# Patient Record
Sex: FEMALE | Race: WHITE | NOT HISPANIC OR LATINO | Employment: OTHER | URBAN - METROPOLITAN AREA
[De-identification: names, ages, dates, MRNs, and addresses within clinical notes are randomized per-mention and may not be internally consistent; named-entity substitution may affect disease eponyms.]

---

## 2017-01-25 DIAGNOSIS — I48.20 CHRONIC ATRIAL FIBRILLATION (HCC): ICD-10-CM

## 2017-01-27 RX ORDER — DABIGATRAN ETEXILATE MESYLATE 75 MG/1
CAPSULE ORAL
Qty: 60 CAP | Refills: 3 | Status: ON HOLD | OUTPATIENT
Start: 2017-01-27 | End: 2017-05-08

## 2017-02-08 DIAGNOSIS — N39.3 STRESS INCONTINENCE: Chronic | ICD-10-CM

## 2017-02-08 RX ORDER — DIAPER,BRIEF,ADULT, DISPOSABLE
1 EACH MISCELLANEOUS 4 TIMES DAILY PRN
Qty: 96 EACH | Status: SHIPPED | OUTPATIENT
Start: 2017-02-08 | End: 2017-02-15 | Stop reason: SDUPTHER

## 2017-02-15 DIAGNOSIS — N39.3 STRESS INCONTINENCE: Chronic | ICD-10-CM

## 2017-02-15 RX ORDER — DIAPER,BRIEF,ADULT, DISPOSABLE
1 EACH MISCELLANEOUS 4 TIMES DAILY PRN
Qty: 96 EACH | Status: SHIPPED | OUTPATIENT
Start: 2017-02-15 | End: 2017-03-17

## 2017-02-16 NOTE — TELEPHONE ENCOUNTER
Was the patient seen in the last year in this department? Yes     Does patient have an active prescription for medications requested? Yes     Received Request Via: Pharmacy     Sent to wrong pharmacy. Please re-sign

## 2017-03-15 ENCOUNTER — OFFICE VISIT (OUTPATIENT)
Dept: MEDICAL GROUP | Facility: MEDICAL CENTER | Age: 82
End: 2017-03-15
Payer: MEDICARE

## 2017-03-15 VITALS
OXYGEN SATURATION: 94 % | DIASTOLIC BLOOD PRESSURE: 66 MMHG | TEMPERATURE: 98.6 F | HEART RATE: 108 BPM | BODY MASS INDEX: 29.08 KG/M2 | SYSTOLIC BLOOD PRESSURE: 112 MMHG | HEIGHT: 62 IN | WEIGHT: 158 LBS | RESPIRATION RATE: 16 BRPM

## 2017-03-15 DIAGNOSIS — F02.80 LATE ONSET ALZHEIMER'S DISEASE WITHOUT BEHAVIORAL DISTURBANCE (HCC): ICD-10-CM

## 2017-03-15 DIAGNOSIS — G30.1 LATE ONSET ALZHEIMER'S DISEASE WITHOUT BEHAVIORAL DISTURBANCE (HCC): ICD-10-CM

## 2017-03-15 DIAGNOSIS — R05.9 COUGH: ICD-10-CM

## 2017-03-15 DIAGNOSIS — J96.11 CHRONIC RESPIRATORY FAILURE WITH HYPOXIA (HCC): ICD-10-CM

## 2017-03-15 DIAGNOSIS — I48.20 ATRIAL FIBRILLATION, CHRONIC (HCC): ICD-10-CM

## 2017-03-15 PROCEDURE — 4040F PNEUMOC VAC/ADMIN/RCVD: CPT | Performed by: INTERNAL MEDICINE

## 2017-03-15 PROCEDURE — 1100F PTFALLS ASSESS-DOCD GE2>/YR: CPT | Performed by: INTERNAL MEDICINE

## 2017-03-15 PROCEDURE — G8420 CALC BMI NORM PARAMETERS: HCPCS | Performed by: INTERNAL MEDICINE

## 2017-03-15 PROCEDURE — 1036F TOBACCO NON-USER: CPT | Performed by: INTERNAL MEDICINE

## 2017-03-15 PROCEDURE — 0518F FALL PLAN OF CARE DOCD: CPT | Mod: 8P | Performed by: INTERNAL MEDICINE

## 2017-03-15 PROCEDURE — 99214 OFFICE O/P EST MOD 30 MIN: CPT | Performed by: INTERNAL MEDICINE

## 2017-03-15 PROCEDURE — G8482 FLU IMMUNIZE ORDER/ADMIN: HCPCS | Performed by: INTERNAL MEDICINE

## 2017-03-15 PROCEDURE — G8432 DEP SCR NOT DOC, RNG: HCPCS | Performed by: INTERNAL MEDICINE

## 2017-03-15 PROCEDURE — 3288F FALL RISK ASSESSMENT DOCD: CPT | Performed by: INTERNAL MEDICINE

## 2017-03-15 RX ORDER — DOXYCYCLINE HYCLATE 100 MG
100 TABLET ORAL 2 TIMES DAILY
Qty: 14 TAB | Refills: 0 | Status: SHIPPED | OUTPATIENT
Start: 2017-03-15 | End: 2017-03-17

## 2017-03-15 NOTE — PROGRESS NOTES
CC: Complaining of cough and follow up other issues.    HPI:   Arlen Singh presents today with the following.    1. Cough  Presents with multiple sick family members at home with upper respirator symptoms. Her symptoms been present from proximally 2 days. Noticed slight development of cough that is fairly unproductive. She reports a very mild sore throat. There is no earaches and denies any increased work of breathing.    2. Chronic respiratory failure with hypoxia (CMS-[facility])  Maintain on regular dose of oxygen and satting well.    3. Atrial fibrillation, chronic (CMS-HCC)  Denies any chest pains or palpitations she is coming due for follow-up with cardiology.    4. Late onset Alzheimer's disease without behavioral disturbance  Daughter reports slight increased fatigue and confusion. Last 2 days. There's been no change in urinary symptoms.      Patient Active Problem List    Diagnosis Date Noted   • Chronic respiratory failure with hypoxia (CMS-[facility]) 02/27/2014     Priority: Medium   • Late onset Alzheimer's disease without behavioral disturbance 03/15/2017   • Mixed incontinence 05/08/2016   • Atrial fibrillation, chronic (CMS-[facility]) 04/15/2016   • Gastroesophageal reflux disease without esophagitis 12/02/2015   • Risk for falls 03/12/2014   • Unstable gait 10/18/2013   • Chronic anticoagulation 10/02/2013   • Dyslipidemia 06/19/2009   • HTN (hypertension), benign 06/19/2009       Current Outpatient Prescriptions   Medication Sig Dispense Refill   • doxycycline (VIBRAMYCIN) 100 MG Tab Take 1 Tab by mouth 2 times a day for 7 days. 14 Tab 0   • Incontinence Supply Disposable (PREVAIL SUPER PLUS LARGE) Misc 1 Each by Other route 4 times a day as needed. USE AS DIRECTED 96 Each PRN   • PRADAXA 75 MG Cap capsule TAKE 1 CAPSULE BY MOUTH TWICE DAILY 60 Cap 3   • VESICARE 10 MG tablet TAKE 1 TABLET BY MOUTH EVERY DAY 30 Tab 11   • rivastigmine (EXELON) 9.5 MG/24HR patch APPLY 1 PATCH EXTERNALLY TO THE SKIN EVERY DAY AS  "DIRECTED (Patient not taking: Reported on 12/20/2016) 30 Patch 0   • levetiracetam (KEPPRA) 250 MG tablet TAKE 1/2 TABLET BY MOUTH TWICE DAILY (Patient not taking: Reported on 12/20/2016) 30 Tab 0   • Dermatological Products, Misc. (MOISTURE BARRIER) Ointment 1 Application by Other route 3 times a day. 6 Tube 10   • NAMENDA XR 28 MG CAPSULE SR 24 HR TAKE 1 CAPSULE BY MOUTH ONCE DAILY 30 Cap 6   • Calcium Carb-Cholecalciferol (CALCIUM 1000 + D PO) Take  by mouth.     • Cyanocobalamin (VITAMIN B 12 PO) Take 5,000 Units by mouth.     • omeprazole (PRILOSEC) 20 MG delayed-release capsule TAKE ONE CAPSULE BY MOUTH ONCE DAILY 30 Cap 11   • digoxin (DIGOX) 125 MCG Tab Take 1 Tab by mouth every day. 30 Tab 11   • Misc. Devices MISC Urinary incontinence underwear because of dementia, alzheimer's and urinary incontinence. 99 Each 99   • vitamin D (CHOLECALCIFEROL) 1000 UNIT TABS Take 2,000 Units by mouth every day.     • polyethylene glycol 3350 (MIRALAX) POWD TAKE 1 CAPFUL BY MOUTH EVERY DAY WITH WATER OR JUICE 527 g 0     No current facility-administered medications for this visit.         Allergies as of 03/15/2017 - George as Reviewed 03/15/2017   Allergen Reaction Noted   • Codeine Rash 06/19/2009   • Avelox [moxifloxacin hcl in nacl]  03/12/2014   • Hctz  03/08/2011        ROS: As per HPI.    /66 mmHg  Pulse 108  Temp(Src) 37 °C (98.6 °F)  Resp 16  Ht 1.575 m (5' 2.01\")  Wt 71.668 kg (158 lb)  BMI 28.89 kg/m2  SpO2 94%    Physical Exam:  Gen:         Alert and oriented, No apparent distress.  Neck:        No Lymphadenopathy or Bruits.  Lungs:     Clear to auscultation bilaterally  CV:          Regular rate and rhythm. No murmurs, rubs or gallops.               Ext:          No clubbing, cyanosis, edema.      Assessment and Plan.   88 y.o. female with the following issues.    1. Cough  Likely viral in etiology but given her comorbidities starting on doxycycline and given ER precautions if she worsening. " Recommended over-the-counter Mucinex.    2. Chronic respiratory failure with hypoxia (CMS-HCC)  Continue O2    3. Atrial fibrillation, chronic (CMS-HCC)  She will schedule appointment with cardiology    4. Late onset Alzheimer's disease without behavioral disturbance  Clinical stable follow-up neurology.

## 2017-03-15 NOTE — MR AVS SNAPSHOT
"Arlen Cruz   3/15/2017 9:00 AM   Office Visit   MRN: [id number]    Department:  [address] [place] [address]   Dept Phone:  [phone]    Description:  Female : 3/9/1929   Provider:  Stuart Santacruz M.D.           Reason for Visit     Follow-Up           Allergies as of 3/15/2017     Allergen Noted Reactions    Codeine 2009   Rash    Avelox [Moxifloxacin Hcl In Nacl] 2014       Hctz 2011       Hyponatremia        You were diagnosed with     Cough   [786.2.ICD-9-CM]       Chronic respiratory failure with hypoxia (CMS-HCC)   [686194]       Atrial fibrillation, chronic (CMS-HCC)   [346569]       Late onset Alzheimer's disease without behavioral disturbance   [2131962]         Vital Signs     Blood Pressure Pulse Temperature Respirations Height Weight    112/66 mmHg 108 37 °C (98.6 °F) 16 1.575 m (5' 2.01\") 71.668 kg (158 lb)    Body Mass Index Oxygen Saturation Smoking Status             28.89 kg/m2 94% Never Smoker          Basic Information     Date Of Birth Sex Race Ethnicity Preferred Language    3/9/1929 Female White Non- English      Your appointments     Mar 29, 2017 11:00 AM   Follow Up Visit with Judy Wood M.D.   [facility] Neurology (--)    [address] [place] [address] [phone]           You will be receiving a confirmation call a few days before your appointment from our automated call confirmation system.              Problem List              ICD-10-CM Priority Class Noted - Resolved    Dyslipidemia (Chronic) E78.5   2009 - Present    HTN (hypertension), benign (Chronic) I10   2009 - Present    Chronic anticoagulation Z79.01   10/2/2013 - Present    Unstable gait R26.81   10/18/2013 - Present    Chronic respiratory failure with hypoxia (CMS-HCC) J96.11 Medium  2014 - Present    Risk for falls Z91.81   3/12/2014 - Present    Gastroesophageal reflux disease without esophagitis K21.9   2015 - Present    Atrial " fibrillation, chronic (CMS-HCC) I48.2   4/15/2016 - Present    Mixed incontinence N39.46   5/8/2016 - Present    Late onset Alzheimer's disease without behavioral disturbance G30.1, F02.80   3/15/2017 - Present      Health Maintenance        Date Due Completion Dates    IMM ZOSTER VACCINE 4/17/2024 (Originally 3/9/1989) ---    IMM DTaP/Tdap/Td Vaccine (2 - Td) 7/12/2021 7/12/2011, 7/7/2011            Current Immunizations     13-VALENT PCV PREVNAR 1/12/2016    Dtap Vaccine 7/12/2011    Influenza TIV (IM) 9/27/2014, 10/1/2013, 10/4/2012, 10/3/2011    Influenza Vaccine Adult HD 9/20/2016    Pneumococcal Vaccine (UF)Historical Data 10/3/2011    Pneumococcal polysaccharide vaccine (PPSV-23) 10/2/2011    TD Vaccine 7/7/2011    Tuberculin Skin Test 1/12/2016, 2/13/2015  3:40 PM, 6/26/2013  1:25 PM, 6/19/2013 12:02 PM      Below and/or attached are the medications your provider expects you to take. Review all of your home medications and newly ordered medications with your provider and/or pharmacist. Follow medication instructions as directed by your provider and/or pharmacist. Please keep your medication list with you and share with your provider. Update the information when medications are discontinued, doses are changed, or new medications (including over-the-counter products) are added; and carry medication information at all times in the event of emergency situations     Allergies:  CODEINE - Rash     AVELOX - (reactions not documented)     HCTZ - (reactions not documented)               Medications  Valid as of: March 15, 2017 -  9:26 AM    Generic Name Brand Name Tablet Size Instructions for use    Calcium Carb-Cholecalciferol   Take  by mouth.        Cholecalciferol (Tab) cholecalciferol 1000 UNIT Take 2,000 Units by mouth every day.        Cyanocobalamin   Take 5,000 Units by mouth.        Dabigatran Etexilate Mesylate (Cap) PRADAXA 75 MG TAKE 1 CAPSULE BY MOUTH TWICE DAILY        Dermatological Products, Misc.  (Ointment) MOISTURE BARRIER  1 Application by Other route 3 times a day.        Digoxin (Tab) LANOXIN 125 MCG Take 1 Tab by mouth every day.        Doxycycline Hyclate (Tab) VIBRAMYCIN 100 MG Take 1 Tab by mouth 2 times a day for 7 days.        Incontinence Supply Disposable (Misc) PREVAIL SUPER PLUS LARGE  1 Each by Other route 4 times a day as needed. USE AS DIRECTED        LevETIRAcetam (Tab) KEPPRA 250 MG TAKE 1/2 TABLET BY MOUTH TWICE DAILY        Memantine HCl (CAPSULE SR 24 HR) NAMENDA XR 28 MG TAKE 1 CAPSULE BY MOUTH ONCE DAILY        Misc. Devices (Misc) Misc. Devices  Urinary incontinence underwear because of dementia, alzheimer's and urinary incontinence.        Omeprazole (CAPSULE DELAYED RELEASE) PRILOSEC 20 MG TAKE ONE CAPSULE BY MOUTH ONCE DAILY        Polyethylene Glycol 3350 (Powder) MIRALAX  TAKE 1 CAPFUL BY MOUTH EVERY DAY WITH WATER OR JUICE        Rivastigmine (PATCH 24 HR) EXELON 9.5 MG/24HR APPLY 1 PATCH EXTERNALLY TO THE SKIN EVERY DAY AS DIRECTED        Solifenacin Succinate (Tab) VESICARE 10 MG TAKE 1 TABLET BY MOUTH EVERY DAY        .                 Medicines prescribed today were sent to:     [id number] - [place], [place] - [address]    [address] [facility] [place] [address]    Phone: [phone] Fax: [phone]    Open 24 Hours?: No    [facility] [address] [place] [address] [place] - [facility] [place] AT [facility] [address] [place] [address]    Phone: [phone] Fax: [phone]    Open 24 Hours?: No      Medication refill instructions:       If your prescription bottle indicates you have medication refills left, it is not necessary to call your provider’s office. Please contact your pharmacy and they will refill your medication.    If your prescription bottle indicates you do not have any refills left, you may request refills at any time through one of the following ways: The online [employer] system (except Urgent Care), by calling your provider’s office,  or by asking your pharmacy to contact your provider’s office with a refill request. Medication refills are processed only during regular business hours and may not be available until the next business day. Your provider may request additional information or to have a follow-up visit with you prior to refilling your medication.   *Please Note: Medication refills are assigned a new Rx number when refilled electronically. Your pharmacy may indicate that no refills were authorized even though a new prescription for the same medication is available at the pharmacy. Please request the medicine by name with the pharmacy before contacting your provider for a refill.           [employer] Access Code: Activation code not generated  Current [employer] Status: Active

## 2017-03-17 ENCOUNTER — APPOINTMENT (OUTPATIENT)
Dept: RADIOLOGY | Facility: MEDICAL CENTER | Age: 82
DRG: 177 | End: 2017-03-17
Attending: EMERGENCY MEDICINE
Payer: MEDICARE

## 2017-03-17 ENCOUNTER — HOSPITAL ENCOUNTER (INPATIENT)
Facility: MEDICAL CENTER | Age: 82
LOS: 6 days | DRG: 177 | End: 2017-03-24
Attending: EMERGENCY MEDICINE | Admitting: HOSPITALIST
Payer: MEDICARE

## 2017-03-17 ENCOUNTER — RESOLUTE PROFESSIONAL BILLING HOSPITAL PROF FEE (OUTPATIENT)
Dept: HOSPITALIST | Facility: MEDICAL CENTER | Age: 82
End: 2017-03-17
Payer: MEDICARE

## 2017-03-17 DIAGNOSIS — J44.1 ACUTE EXACERBATION OF CHRONIC OBSTRUCTIVE PULMONARY DISEASE (COPD) (HCC): ICD-10-CM

## 2017-03-17 DIAGNOSIS — R50.9 FEVER, UNSPECIFIED FEVER CAUSE: ICD-10-CM

## 2017-03-17 DIAGNOSIS — I48.20 CHRONIC ATRIAL FIBRILLATION (HCC): ICD-10-CM

## 2017-03-17 DIAGNOSIS — R05.9 COUGH: ICD-10-CM

## 2017-03-17 DIAGNOSIS — R09.02 HYPOXIA: ICD-10-CM

## 2017-03-17 DIAGNOSIS — J18.9 PNEUMONIA OF BOTH LOWER LOBES DUE TO INFECTIOUS ORGANISM: ICD-10-CM

## 2017-03-17 PROBLEM — N30.00 ACUTE CYSTITIS: Status: ACTIVE | Noted: 2017-03-17

## 2017-03-17 PROBLEM — J20.9 ACUTE BRONCHITIS: Status: ACTIVE | Noted: 2017-03-17

## 2017-03-17 LAB
ALBUMIN SERPL BCP-MCNC: 3.8 G/DL (ref 3.2–4.9)
ALBUMIN/GLOB SERPL: 1.3 G/DL
ALP SERPL-CCNC: 63 U/L (ref 30–99)
ALT SERPL-CCNC: 21 U/L (ref 2–50)
ANION GAP SERPL CALC-SCNC: 7 MMOL/L (ref 0–11.9)
APPEARANCE UR: ABNORMAL
AST SERPL-CCNC: 29 U/L (ref 12–45)
BACTERIA #/AREA URNS HPF: ABNORMAL /HPF
BASOPHILS # BLD AUTO: 0.3 % (ref 0–1.8)
BASOPHILS # BLD: 0.02 K/UL (ref 0–0.12)
BILIRUB SERPL-MCNC: 0.4 MG/DL (ref 0.1–1.5)
BILIRUB UR QL STRIP.AUTO: NEGATIVE
BNP SERPL-MCNC: 234 PG/ML (ref 0–100)
BUN SERPL-MCNC: 26 MG/DL (ref 8–22)
CALCIUM SERPL-MCNC: 8.7 MG/DL (ref 8.5–10.5)
CHLORIDE SERPL-SCNC: 105 MMOL/L (ref 96–112)
CO2 SERPL-SCNC: 26 MMOL/L (ref 20–33)
COLOR UR: ABNORMAL
CREAT SERPL-MCNC: 0.99 MG/DL (ref 0.5–1.4)
EOSINOPHIL # BLD AUTO: 0.03 K/UL (ref 0–0.51)
EOSINOPHIL NFR BLD: 0.4 % (ref 0–6.9)
EPI CELLS #/AREA URNS HPF: ABNORMAL /HPF
ERYTHROCYTE [DISTWIDTH] IN BLOOD BY AUTOMATED COUNT: 49.2 FL (ref 35.9–50)
FLUAV H1 2009 PAND RNA SPEC QL NAA+PROBE: NOT DETECTED
FLUAV RNA SPEC QL NAA+PROBE: NEGATIVE
FLUAV+FLUBV AG SPEC QL IA: NORMAL
FLUBV RNA SPEC QL NAA+PROBE: NEGATIVE
GFR SERPL CREATININE-BSD FRML MDRD: 53 ML/MIN/1.73 M 2
GLOBULIN SER CALC-MCNC: 3 G/DL (ref 1.9–3.5)
GLUCOSE SERPL-MCNC: 114 MG/DL (ref 65–99)
GLUCOSE UR STRIP.AUTO-MCNC: NEGATIVE MG/DL
HCT VFR BLD AUTO: 41.4 % (ref 37–47)
HGB BLD-MCNC: 13 G/DL (ref 12–16)
IMM GRANULOCYTES # BLD AUTO: 0.02 K/UL (ref 0–0.11)
IMM GRANULOCYTES NFR BLD AUTO: 0.3 % (ref 0–0.9)
KETONES UR STRIP.AUTO-MCNC: NEGATIVE MG/DL
LACTATE BLD-SCNC: 1.1 MMOL/L (ref 0.5–2)
LACTATE BLD-SCNC: 1.4 MMOL/L (ref 0.5–2)
LACTATE BLD-SCNC: 1.6 MMOL/L (ref 0.5–2)
LEUKOCYTE ESTERASE UR QL STRIP.AUTO: ABNORMAL
LYMPHOCYTES # BLD AUTO: 3 K/UL (ref 1–4.8)
LYMPHOCYTES NFR BLD: 39.4 % (ref 22–41)
MCH RBC QN AUTO: 28.2 PG (ref 27–33)
MCHC RBC AUTO-ENTMCNC: 31.4 G/DL (ref 33.6–35)
MCV RBC AUTO: 89.8 FL (ref 81.4–97.8)
MICRO URNS: ABNORMAL
MONOCYTES # BLD AUTO: 0.51 K/UL (ref 0–0.85)
MONOCYTES NFR BLD AUTO: 6.7 % (ref 0–13.4)
MUCOUS THREADS #/AREA URNS HPF: ABNORMAL /HPF
NEUTROPHILS # BLD AUTO: 4.04 K/UL (ref 2–7.15)
NEUTROPHILS NFR BLD: 52.9 % (ref 44–72)
NITRITE UR QL STRIP.AUTO: NEGATIVE
NRBC # BLD AUTO: 0 K/UL
NRBC BLD AUTO-RTO: 0 /100 WBC
PH UR STRIP.AUTO: 5 [PH]
PLATELET # BLD AUTO: 127 K/UL (ref 164–446)
PMV BLD AUTO: 11.6 FL (ref 9–12.9)
POTASSIUM SERPL-SCNC: 4.3 MMOL/L (ref 3.6–5.5)
PROT SERPL-MCNC: 6.8 G/DL (ref 6–8.2)
PROT UR QL STRIP: NEGATIVE MG/DL
RBC # BLD AUTO: 4.61 M/UL (ref 4.2–5.4)
RBC # URNS HPF: ABNORMAL /HPF
RBC UR QL AUTO: NEGATIVE
SIGNIFICANT IND 70042: NORMAL
SITE SITE: NORMAL
SODIUM SERPL-SCNC: 138 MMOL/L (ref 135–145)
SOURCE SOURCE: NORMAL
SP GR UR STRIP.AUTO: 1.02
TROPONIN I SERPL-MCNC: 0.03 NG/ML (ref 0–0.04)
WBC # BLD AUTO: 7.6 K/UL (ref 4.8–10.8)
WBC #/AREA URNS HPF: ABNORMAL /HPF

## 2017-03-17 PROCEDURE — 87086 URINE CULTURE/COLONY COUNT: CPT

## 2017-03-17 PROCEDURE — 36415 COLL VENOUS BLD VENIPUNCTURE: CPT

## 2017-03-17 PROCEDURE — 80053 COMPREHEN METABOLIC PANEL: CPT

## 2017-03-17 PROCEDURE — G0378 HOSPITAL OBSERVATION PER HR: HCPCS

## 2017-03-17 PROCEDURE — 99220 PR INITIAL OBSERVATION CARE,LEVL III: CPT | Performed by: HOSPITALIST

## 2017-03-17 PROCEDURE — 87040 BLOOD CULTURE FOR BACTERIA: CPT

## 2017-03-17 PROCEDURE — 81001 URINALYSIS AUTO W/SCOPE: CPT

## 2017-03-17 PROCEDURE — 96374 THER/PROPH/DIAG INJ IV PUSH: CPT

## 2017-03-17 PROCEDURE — 84484 ASSAY OF TROPONIN QUANT: CPT

## 2017-03-17 PROCEDURE — 94640 AIRWAY INHALATION TREATMENT: CPT

## 2017-03-17 PROCEDURE — 71010 DX-CHEST-PORTABLE (1 VIEW): CPT

## 2017-03-17 PROCEDURE — 87400 INFLUENZA A/B EACH AG IA: CPT

## 2017-03-17 PROCEDURE — 93005 ELECTROCARDIOGRAM TRACING: CPT | Performed by: EMERGENCY MEDICINE

## 2017-03-17 PROCEDURE — 85025 COMPLETE CBC W/AUTO DIFF WBC: CPT

## 2017-03-17 PROCEDURE — 87503 INFLUENZA DNA AMP PROB ADDL: CPT

## 2017-03-17 PROCEDURE — 700101 HCHG RX REV CODE 250: Performed by: EMERGENCY MEDICINE

## 2017-03-17 PROCEDURE — 87502 INFLUENZA DNA AMP PROBE: CPT

## 2017-03-17 PROCEDURE — 83880 ASSAY OF NATRIURETIC PEPTIDE: CPT

## 2017-03-17 PROCEDURE — 51701 INSERT BLADDER CATHETER: CPT

## 2017-03-17 PROCEDURE — 304562 HCHG STAT O2 MASK/CANNULA

## 2017-03-17 PROCEDURE — 83605 ASSAY OF LACTIC ACID: CPT | Mod: 91

## 2017-03-17 PROCEDURE — 96375 TX/PRO/DX INJ NEW DRUG ADDON: CPT

## 2017-03-17 PROCEDURE — 96376 TX/PRO/DX INJ SAME DRUG ADON: CPT

## 2017-03-17 PROCEDURE — 99284 EMERGENCY DEPT VISIT MOD MDM: CPT

## 2017-03-17 PROCEDURE — 700111 HCHG RX REV CODE 636 W/ 250 OVERRIDE (IP): Performed by: EMERGENCY MEDICINE

## 2017-03-17 RX ORDER — SODIUM CHLORIDE 9 MG/ML
INJECTION, SOLUTION INTRAVENOUS
Status: COMPLETED
Start: 2017-03-17 | End: 2017-03-18

## 2017-03-17 RX ORDER — BISACODYL 10 MG
10 SUPPOSITORY, RECTAL RECTAL
Status: DISCONTINUED | OUTPATIENT
Start: 2017-03-17 | End: 2017-03-24 | Stop reason: HOSPADM

## 2017-03-17 RX ORDER — MEMANTINE HYDROCHLORIDE 10 MG/1
28 TABLET ORAL DAILY
Status: DISCONTINUED | OUTPATIENT
Start: 2017-03-18 | End: 2017-03-17

## 2017-03-17 RX ORDER — OMEPRAZOLE 20 MG/1
20 CAPSULE, DELAYED RELEASE ORAL DAILY
Status: DISCONTINUED | OUTPATIENT
Start: 2017-03-18 | End: 2017-03-24 | Stop reason: HOSPADM

## 2017-03-17 RX ORDER — OMEPRAZOLE 20 MG/1
20 CAPSULE, DELAYED RELEASE ORAL DAILY
Status: DISCONTINUED | OUTPATIENT
Start: 2017-03-18 | End: 2017-03-17

## 2017-03-17 RX ORDER — AZITHROMYCIN 500 MG/1
500 INJECTION, POWDER, LYOPHILIZED, FOR SOLUTION INTRAVENOUS ONCE
Status: COMPLETED | OUTPATIENT
Start: 2017-03-17 | End: 2017-03-17

## 2017-03-17 RX ORDER — AZITHROMYCIN 250 MG/1
250 TABLET, FILM COATED ORAL DAILY
Status: COMPLETED | OUTPATIENT
Start: 2017-03-18 | End: 2017-03-21

## 2017-03-17 RX ORDER — PREDNISONE 20 MG/1
40 TABLET ORAL DAILY
Status: DISCONTINUED | OUTPATIENT
Start: 2017-03-18 | End: 2017-03-18

## 2017-03-17 RX ORDER — POLYETHYLENE GLYCOL 3350 17 G/17G
1 POWDER, FOR SOLUTION ORAL
Status: DISCONTINUED | OUTPATIENT
Start: 2017-03-17 | End: 2017-03-24 | Stop reason: HOSPADM

## 2017-03-17 RX ORDER — MEMANTINE HYDROCHLORIDE 10 MG/1
10 TABLET ORAL 2 TIMES DAILY
Status: DISCONTINUED | OUTPATIENT
Start: 2017-03-18 | End: 2017-03-24 | Stop reason: HOSPADM

## 2017-03-17 RX ORDER — ONDANSETRON 2 MG/ML
4 INJECTION INTRAMUSCULAR; INTRAVENOUS EVERY 4 HOURS PRN
Status: DISCONTINUED | OUTPATIENT
Start: 2017-03-17 | End: 2017-03-24 | Stop reason: HOSPADM

## 2017-03-17 RX ORDER — GUAIFENESIN/DEXTROMETHORPHAN 100-10MG/5
10 SYRUP ORAL EVERY 6 HOURS PRN
Status: DISCONTINUED | OUTPATIENT
Start: 2017-03-17 | End: 2017-03-24 | Stop reason: HOSPADM

## 2017-03-17 RX ORDER — AMOXICILLIN 250 MG
2 CAPSULE ORAL 2 TIMES DAILY
Status: DISCONTINUED | OUTPATIENT
Start: 2017-03-18 | End: 2017-03-24 | Stop reason: HOSPADM

## 2017-03-17 RX ORDER — OXYBUTYNIN CHLORIDE 5 MG/1
10 TABLET, EXTENDED RELEASE ORAL DAILY
Status: DISCONTINUED | OUTPATIENT
Start: 2017-03-18 | End: 2017-03-24 | Stop reason: HOSPADM

## 2017-03-17 RX ORDER — DIGOXIN 125 MCG
125 TABLET ORAL DAILY
Status: DISCONTINUED | OUTPATIENT
Start: 2017-03-17 | End: 2017-03-24 | Stop reason: HOSPADM

## 2017-03-17 RX ORDER — DABIGATRAN ETEXILATE 75 MG/1
75 CAPSULE ORAL 2 TIMES DAILY
Status: DISCONTINUED | OUTPATIENT
Start: 2017-03-17 | End: 2017-03-24 | Stop reason: HOSPADM

## 2017-03-17 RX ORDER — ONDANSETRON 4 MG/1
4 TABLET, ORALLY DISINTEGRATING ORAL EVERY 4 HOURS PRN
Status: DISCONTINUED | OUTPATIENT
Start: 2017-03-17 | End: 2017-03-24 | Stop reason: HOSPADM

## 2017-03-17 RX ORDER — LEVETIRACETAM 250 MG/1
125 TABLET ORAL 2 TIMES DAILY
Status: DISCONTINUED | OUTPATIENT
Start: 2017-03-17 | End: 2017-03-17

## 2017-03-17 RX ORDER — POLYETHYLENE GLYCOL 3350 17 G/17G
1 POWDER, FOR SOLUTION ORAL DAILY
Status: DISCONTINUED | OUTPATIENT
Start: 2017-03-18 | End: 2017-03-17

## 2017-03-17 RX ORDER — ASCORBIC ACID 500 MG
500 TABLET ORAL EVERY MORNING
Status: ON HOLD | COMMUNITY
End: 2017-05-16

## 2017-03-17 RX ORDER — RIVASTIGMINE 9.5 MG/24H
1 PATCH, EXTENDED RELEASE TRANSDERMAL DAILY
Status: DISCONTINUED | OUTPATIENT
Start: 2017-03-18 | End: 2017-03-17

## 2017-03-17 RX ORDER — ACETAMINOPHEN 325 MG/1
650 TABLET ORAL EVERY 6 HOURS PRN
Status: DISCONTINUED | OUTPATIENT
Start: 2017-03-17 | End: 2017-03-24 | Stop reason: HOSPADM

## 2017-03-17 RX ORDER — ALBUTEROL SULFATE 2.5 MG/3ML
2.5 SOLUTION RESPIRATORY (INHALATION)
Status: DISCONTINUED | OUTPATIENT
Start: 2017-03-17 | End: 2017-03-17

## 2017-03-17 RX ORDER — SOLIFENACIN SUCCINATE 10 MG/1
10 TABLET, FILM COATED ORAL
Status: DISCONTINUED | OUTPATIENT
Start: 2017-03-17 | End: 2017-03-17

## 2017-03-17 RX ORDER — CEFTRIAXONE 2 G/1
2 INJECTION, POWDER, FOR SOLUTION INTRAMUSCULAR; INTRAVENOUS ONCE
Status: COMPLETED | OUTPATIENT
Start: 2017-03-17 | End: 2017-03-17

## 2017-03-17 RX ADMIN — IPRATROPIUM BROMIDE 0.5 MG: 0.5 SOLUTION RESPIRATORY (INHALATION) at 21:12

## 2017-03-17 RX ADMIN — AZITHROMYCIN FOR INJECTION INJECTION, POWDER, LYOPHILIZED, FOR SOLUTION 500 MG: 500 INJECTION INTRAVENOUS at 20:00

## 2017-03-17 RX ADMIN — CEFTRIAXONE SODIUM 2 G: 2 INJECTION, POWDER, FOR SOLUTION INTRAMUSCULAR; INTRAVENOUS at 19:38

## 2017-03-17 RX ADMIN — ALBUTEROL SULFATE 2.5 MG: 2.5 SOLUTION RESPIRATORY (INHALATION) at 21:12

## 2017-03-17 ASSESSMENT — PAIN SCALES - GENERAL
PAINLEVEL_OUTOF10: 0
PAINLEVEL_OUTOF10: 0

## 2017-03-17 ASSESSMENT — CHA2DS2 SCORE
DIABETES: NO
CHF OR LEFT VENTRICULAR DYSFUNCTION: NO
HYPERTENSION: YES
CHA2DS2 VASC SCORE: 4
AGE 65 TO 74: NO
PRIOR STROKE OR TIA OR THROMBOEMBOLISM: NO
AGE 75 OR GREATER: YES
SEX: FEMALE
VASCULAR DISEASE: NO

## 2017-03-17 NOTE — IP AVS SNAPSHOT
3/24/2017          [place] [address]    Dear Arlen Singh:    [facility] wants to ensure your discharge home is safe and you or your loved ones have had all your questions answered regarding your care after you leave the hospital.    You may receive a telephone call within two days of your discharge.  This call is to make certain you understand your discharge instructions as well as ensure we provided you with the best care possible during your stay with us.     The call will only last approximately 3-5 minutes and will be done by a nurse.    Once again, we want to ensure your discharge home is safe and that you have a clear understanding of any next steps in your care.  If you have any questions or concerns, please do not hesitate to contact us, we are here for you.  Thank you for choosing [facility] for your healthcare needs.    Sincerely,    Rahul Virgen    [facility]

## 2017-03-17 NOTE — IP AVS SNAPSHOT
Liftopia Access Code: Activation code not generated  Current Liftopia Status: Active    [employer]  A secure, online tool to manage your health information     [employer]’s Liftopia® is a secure, online tool that connects you to your personalized health information from the privacy of your home -- day or night - making it very easy for you to manage your healthcare. Once the activation process is completed, you can even access your medical information using the Liftopia parveen, which is available for free in the [employer] Parveen store or Google Play store.     Liftopia provides the following levels of access (as shown below):   My Chart Features   [facility] Primary Care Doctor [facility]  Specialists [facility]  Urgent  Care Non-[facility]  Primary Care  Doctor   Email your healthcare team securely and privately 24/7 X X X X   Manage appointments: schedule your next appointment; view details of past/upcoming appointments X      Request prescription refills. X      View recent personal medical records, including lab and immunizations X X X X   View health record, including health history, allergies, medications X X X X   Read reports about your outpatient visits, procedures, consult and ER notes X X X X   See your discharge summary, which is a recap of your hospital and/or ER visit that includes your diagnosis, lab results, and care plan. X X       How to register for Liftopia:  1. Go to  [email or web address].  2. Click on the Sign Up Now box, which takes you to the New Member Sign Up page. You will need to provide the following information:  a. Enter your Liftopia Access Code exactly as it appears at the top of this page. (You will not need to use this code after you’ve completed the sign-up process. If you do not sign up before the expiration date, you must request a new code.)   b. Enter your date of birth.   c. Enter your home email address.   d. Click Submit, and follow the next screen’s instructions.  3. Create a Liftopia ID. This will  be your [facility] login ID and cannot be changed, so think of one that is secure and easy to remember.  4. Create a [employer] password. You can change your password at any time.  5. Enter your Password Reset Question and Answer. This can be used at a later time if you forget your password.   6. Enter your e-mail address. This allows you to receive e-mail notifications when new information is available in [facility].  7. Click Sign Up. You can now view your health information.    For assistance activating your [employer] account, call [phone]

## 2017-03-17 NOTE — IP AVS SNAPSHOT
" <p align=\"LEFT\"><IMG SRC=\"//[email or web address]\" alt=\"Image\" WIDTH=\"50%\" HEIGHT=\"200\" BORDER=\"\"></p>                   Name:Arlen Cruz  Medical Record Number:[id number]  CSN: [id number]    YOB: 1929   Age: 88 y.o.  Sex: female  HT:1.6 m (5' 3\") WT: 74.5 kg (164 lb 3.9 oz)          Admit Date: 3/17/2017     Discharge Date:   Today's Date: 3/24/2017  Attending Doctor:  Eloy Rodney M.D.                  Allergies:  Hctz; Avelox; and Codeine          Your appointments     Mar 29, 2017 11:00 AM   Follow Up Visit with Judy Wood M.D.   [facility] Neurology (--)    [address], [address]  [facility] [address] [phone]           You will be receiving a confirmation call a few days before your appointment from our automated call confirmation system.              Follow-up Information     1. Follow up with Stuart Santacruz M.D. In 4 weeks.    Specialty:  Internal Medicine    Contact information    [address] [facility]  [address]  [facility] [address] [phone]          2. Follow up with Ana Luisa Lamar MD,[facility] In 4 weeks.    Specialty:  Cardiology    Contact information    [address] [facility] [address]  [facility] [address] [phone]          3. Follow up with [facility]) .    Specialty:  Skilled Nursing Facility    Contact information    [address] [facility] [address] [phone]         Medication List      Take these Medications        Instructions    ascorbic acid 500 MG Tabs   Commonly known as:  ascorbic acid    Take 500 mg by mouth every day.   Dose:  500 mg       cefdinir 300 MG Caps   Commonly known as:  OMNICEF    Take 1 Cap by mouth every 12 hours for 3 days.   Dose:  300 mg       digoxin 125 MCG Tabs   Commonly known as:  DIGOX    Take 1 Tab by mouth every day.   Dose:  125 mcg       metronidazole 500 MG Tabs   Commonly known as:  FLAGYL    Take 1 Tab by mouth every 8 hours for 3 days.   Dose:  500 mg       NAMENDA XR 28 MG " Cp24   Generic drug:  Memantine HCl ER    TAKE 1 CAPSULE BY MOUTH ONCE DAILY       omeprazole 20 MG delayed-release capsule   Commonly known as:  PRILOSEC    TAKE ONE CAPSULE BY MOUTH ONCE DAILY       PRADAXA 75 MG Caps capsule   Generic drug:  dabigatran    TAKE 1 CAPSULE BY MOUTH TWICE DAILY       predniSONE 10 MG Tabs   Commonly known as:  DELTASONE    4 tabs qd x 3d, 3 tabs qd x 3d, 2 tabs q x 3d, 1 tab qd x 3d.       VESICARE 10 MG tablet   Generic drug:  solifenacin    TAKE 1 TABLET BY MOUTH EVERY DAY

## 2017-03-17 NOTE — IP AVS SNAPSHOT
" Home Care Instructions                                                                                                                  Name:Arlen Cruz  Medical Record Number:[id number]  CSN: [id number]    YOB: 1929   Age: 88 y.o.  Sex: female  HT:1.6 m (5' 3\") WT: 74.5 kg (164 lb 3.9 oz)          Admit Date: 3/17/2017     Discharge Date:   Today's Date: 3/24/2017  Attending Doctor:  Eloy Rodney M.D.                  Allergies:  Hctz; Avelox; and Codeine            Discharge Instructions       Discharge Instructions    Discharged to group home by medical transportation with escort. Discharged via wheelchair, hospital escort: Yes.  Special equipment needed: Not Applicable    Be sure to schedule a follow-up appointment with your primary care doctor or any specialists as instructed.     Discharge Plan:        I understand that a diet low in cholesterol, fat, and sodium is recommended for good health. Unless I have been given specific instructions below for another diet, I accept this instruction as my diet prescription.   Other diet: Nectar thick     Special Instructions: None    · Is patient discharged on Warfarin / Coumadin?   No     · Is patient Post Blood Transfusion?  No    Depression / Suicide Risk    As you are discharged from this [facility] facility, it is important to learn how to keep safe from harming yourself.    Recognize the warning signs:  · Abrupt changes in personality, positive or negative- including increase in energy   · Giving away possessions  · Change in eating patterns- significant weight changes-  positive or negative  · Change in sleeping patterns- unable to sleep or sleeping all the time   · Unwillingness or inability to communicate  · Depression  · Unusual sadness, discouragement and loneliness  · Talk of wanting to die  · Neglect of personal appearance   · Rebelliousness- reckless behavior  · Withdrawal from people/activities they love  · Confusion- " inability to concentrate     If you or a loved one observes any of these behaviors or has concerns about self-harm, here's what you can do:  · Talk about it- your feelings and reasons for harming yourself  · Remove any means that you might use to hurt yourself (examples: pills, rope, extension cords, firearm)  · Get professional help from the community (Mental Health, Substance Abuse, psychological counseling)  · Do not be alone:Call your Safe Contact- someone whom you trust who will be there for you.  · Call your local CRISIS HOTLINE 613-0401 or 980-435-8666  · Call your local Children's Mobile Crisis Response Team [place] (471) 418-5720 or www.Makstr  · Call the toll free National Suicide Prevention Hotlines   · National Suicide Prevention Lifeline 031-622-IJDR (7918)  · [employer] Line Network 800-SUICIDE (528-4359)    Bronchitis  Bronchitis is a problem of the air tubes leading to your lungs. This problem makes it hard for air to get in and out of the lungs. You may cough a lot because your air tubes are narrow. Going without care can cause lasting (chronic) bronchitis.  HOME CARE   · Drink enough fluids to keep your pee (urine) clear or pale yellow.  · Use a cool mist humidifier.  · Quit smoking if you smoke. If you keep smoking, the bronchitis might not get better.  · Only take medicine as told by your doctor.  GET HELP RIGHT AWAY IF:   · Coughing keeps you awake.  · You start to wheeze.  · You become more sick or weak.  · You have a hard time breathing or get short of breath.  · You cough up blood.  · Coughing lasts more than 2 weeks.  · You have a fever.  · Your baby is older than 3 months with a rectal temperature of 102° F (38.9° C) or higher.  · Your baby is 3 months old or younger with a rectal temperature of 100.4° F (38° C) or higher.  MAKE SURE YOU:  · Understand these instructions.  · Will watch your condition.  · Will get help right away if you are not doing well or get  worse.  Document Released: 06/05/2009 Document Revised: 03/11/2013 Document Reviewed: 11/19/2010  ExitCare® Patient Information ©2014 [employer].        Your appointments     Mar 29, 2017 11:00 AM   Follow Up Visit with Judy Wood M.D.   [facility] (--)    [address], [address]  [facility] [id number] [phone]           You will be receiving a confirmation call a few days before your appointment from our automated call confirmation system.              Follow-up Information     1. Follow up with Stuart Santacruz M.D. In 4 weeks.    Specialty:  Internal Medicine    Contact information    [address]  [address]  [facility] [address] [phone]          2. Follow up with Ana Luisa Lamar MD,[facility] In 4 weeks.    Specialty:  Cardiology    Contact information    [address]  [facility] [address] [phone]          3. Follow up with [facility] ([facility]) .    Specialty:  Skilled Nursing Facility    Contact information    [address] [facility] [address]  [phone]         Discharge Medication Instructions:    Below are the medications your physician expects you to take upon discharge:    Review all your home medications and newly ordered medications with your doctor and/or pharmacist. Follow medication instructions as directed by your doctor and/or pharmacist.    Please keep your medication list with you and share with your physician.               Medication List      START taking these medications        Instructions    cefdinir 300 MG Caps   Last time this was given:  300 mg on 3/24/2017  8:49 AM   Commonly known as:  OMNICEF    Take 1 Cap by mouth every 12 hours for 3 days.   Dose:  300 mg       metronidazole 500 MG Tabs   Last time this was given:  500 mg on 3/24/2017  5:00 AM   Commonly known as:  FLAGYL    Take 1 Tab by mouth every 8 hours for 3 days.   Dose:  500 mg       predniSONE 10 MG Tabs   Last time this was given:  40 mg on 3/24/2017   8:49 AM   Commonly known as:  DELTASONE    4 tabs qd x 3d, 3 tabs qd x 3d, 2 tabs q x 3d, 1 tab qd x 3d.         CONTINUE taking these medications        Instructions    ascorbic acid 500 MG Tabs   Commonly known as:  ascorbic acid    Take 500 mg by mouth every day.   Dose:  500 mg       digoxin 125 MCG Tabs   Last time this was given:  125 mcg on 3/23/2017  6:03 PM   Commonly known as:  DIGOX    Take 1 Tab by mouth every day.   Dose:  125 mcg       NAMENDA XR 28 MG Cp24   Generic drug:  Memantine HCl ER    TAKE 1 CAPSULE BY MOUTH ONCE DAILY       omeprazole 20 MG delayed-release capsule   Last time this was given:  20 mg on 3/24/2017  8:49 AM   Commonly known as:  PRILOSEC    TAKE ONE CAPSULE BY MOUTH ONCE DAILY       PRADAXA 75 MG Caps capsule   Last time this was given:  75 mg on 3/24/2017  8:49 AM   Generic drug:  dabigatran    TAKE 1 CAPSULE BY MOUTH TWICE DAILY       VESICARE 10 MG tablet   Generic drug:  solifenacin    TAKE 1 TABLET BY MOUTH EVERY DAY               Instructions           Diet / Nutrition:    Follow any diet instructions given to you by your doctor or the dietician, including how much salt (sodium) you are allowed each day.    If you are overweight, talk to your doctor about a weight reduction plan.    Activity:    Remain physically active following your doctor's instructions about exercise and activity.    Rest often.     Any time you become even a little tired or short of breath, SIT DOWN and rest.    Worsening Symptoms:    Report any of the following signs and symptoms to the doctor's office immediately:    *Pain of jaw, arm, or neck  *Chest pain not relieved by medication                               *Dizziness or loss of consciousness  *Difficulty breathing even when at rest   *More tired than usual                                       *Bleeding drainage or swelling of surgical site  *Swelling of feet, ankles, legs or stomach                 *Fever (>100ºF)  *Pink or blood tinged  sputum  *Weight gain (3lbs/day or 5lbs /week)           *Shock from internal defibrillator (if applicable)  *Palpitations or irregular heartbeats                *Cool and/or numb extremities    Stroke Awareness    Common Risk Factors for Stroke include:    Age  Atrial Fibrillation  Carotid Artery Stenosis  Diabetes Mellitus  Excessive alcohol consumption  High blood pressure  Overweight   Physical inactivity  Smoking    Warning signs and symptoms of a stroke include:    *Sudden numbness or weakness of the face, arm or leg (especially on one side of the body).  *Sudden confusion, trouble speaking or understanding.  *Sudden trouble seeing in one or both eyes.  *Sudden trouble walking, dizziness, loss of balance or coordination.Sudden severe headache with no known cause.    It is very important to get treatment quickly when a stroke occurs. If you experience any of the above warning signs, call 911 immediately.                   Disclaimer         Quit Smoking / Tobacco Use:    I understand the use of any tobacco products increases my chance of suffering from future heart disease or stroke and could cause other illnesses which may shorten my life. Quitting the use of tobacco products is the single most important thing I can do to improve my health. For further information on smoking / tobacco cessation call a Toll Free Quit Line at 1-852.211.2178 (*National Cancer [place]) or 1-848.545.7178 (American Lung Association) or you can access the web based program at www.lungusa.org.    [place] Tobacco Users Help Line:  (673) 448-1582       Toll Free: 1-499.170.4998  Quit Tobacco Program [facility] (259)396-3016    Crisis Hotline:    [place] Crisis Hotline:  3-278-AEVBEPJ or 1-809.412.3227    [place] Crisis Hotline:    1-176.215.6586 or 009-968-9655    Discharge Survey:   Thank you for choosing [facility]. We hope we did everything we could to make your hospital stay a pleasant one. You may be  receiving a phone survey and we would appreciate your time and participation in answering the questions. Your input is very valuable to us in our efforts to improve our service to our patients and their families.        My signature on this form indicates that:    1. I have reviewed and understand the above information.  2. My questions regarding this information have been answered to my satisfaction.  3. I have formulated a plan with my discharge nurse to obtain my prescribed medications for home.                  Disclaimer         __________________________________                     __________       ________                       Patient Signature                                                 Date                    Time

## 2017-03-18 PROBLEM — I49.5 SICK SINUS SYNDROME (HCC): Status: ACTIVE | Noted: 2017-03-18

## 2017-03-18 PROBLEM — I48.91 ATRIAL FIBRILLATION WITH RVR (HCC): Status: ACTIVE | Noted: 2017-03-18

## 2017-03-18 PROBLEM — J96.01 ACUTE RESPIRATORY FAILURE WITH HYPOXIA (HCC): Status: ACTIVE | Noted: 2017-03-18

## 2017-03-18 PROBLEM — J18.9 CAP (COMMUNITY ACQUIRED PNEUMONIA): Status: ACTIVE | Noted: 2017-03-18

## 2017-03-18 PROBLEM — I45.5 SINUS PAUSE: Status: ACTIVE | Noted: 2017-03-18

## 2017-03-18 PROBLEM — D68.32 HEMORRHAGIC DISORDER DUE TO EXTRINSIC CIRCULATING ANTICOAGULANTS (HCC): Status: ACTIVE | Noted: 2017-03-18

## 2017-03-18 LAB
ANION GAP SERPL CALC-SCNC: 7 MMOL/L (ref 0–11.9)
ANION GAP SERPL CALC-SCNC: 9 MMOL/L (ref 0–11.9)
BASE EXCESS BLDA CALC-SCNC: -1 MMOL/L (ref -4–3)
BODY TEMPERATURE: ABNORMAL CENTIGRADE
BUN SERPL-MCNC: 19 MG/DL (ref 8–22)
BUN SERPL-MCNC: 23 MG/DL (ref 8–22)
CALCIUM SERPL-MCNC: 8 MG/DL (ref 8.5–10.5)
CALCIUM SERPL-MCNC: 8.5 MG/DL (ref 8.5–10.5)
CHLORIDE SERPL-SCNC: 106 MMOL/L (ref 96–112)
CHLORIDE SERPL-SCNC: 107 MMOL/L (ref 96–112)
CO2 SERPL-SCNC: 23 MMOL/L (ref 20–33)
CO2 SERPL-SCNC: 24 MMOL/L (ref 20–33)
CREAT SERPL-MCNC: 0.77 MG/DL (ref 0.5–1.4)
CREAT SERPL-MCNC: 0.92 MG/DL (ref 0.5–1.4)
EKG IMPRESSION: NORMAL
EKG IMPRESSION: NORMAL
ERYTHROCYTE [DISTWIDTH] IN BLOOD BY AUTOMATED COUNT: 49.2 FL (ref 35.9–50)
GFR SERPL CREATININE-BSD FRML MDRD: 58 ML/MIN/1.73 M 2
GFR SERPL CREATININE-BSD FRML MDRD: >60 ML/MIN/1.73 M 2
GLUCOSE SERPL-MCNC: 104 MG/DL (ref 65–99)
GLUCOSE SERPL-MCNC: 231 MG/DL (ref 65–99)
HCO3 BLDA-SCNC: 22 MMOL/L (ref 17–25)
HCT VFR BLD AUTO: 36.6 % (ref 37–47)
HGB BLD-MCNC: 11.6 G/DL (ref 12–16)
MAGNESIUM SERPL-MCNC: 1.7 MG/DL (ref 1.5–2.5)
MCH RBC QN AUTO: 28.2 PG (ref 27–33)
MCHC RBC AUTO-ENTMCNC: 31.7 G/DL (ref 33.6–35)
MCV RBC AUTO: 88.8 FL (ref 81.4–97.8)
PCO2 BLDA: 32.3 MMHG (ref 26–37)
PH BLDA: 7.45 [PH] (ref 7.4–7.5)
PLATELET # BLD AUTO: 113 K/UL (ref 164–446)
PMV BLD AUTO: 11.7 FL (ref 9–12.9)
PO2 BLDA: 97.6 MMHG (ref 64–87)
POTASSIUM SERPL-SCNC: 3.9 MMOL/L (ref 3.6–5.5)
POTASSIUM SERPL-SCNC: 4.3 MMOL/L (ref 3.6–5.5)
RBC # BLD AUTO: 4.12 M/UL (ref 4.2–5.4)
SAO2 % BLDA: 97.3 % (ref 93–99)
SODIUM SERPL-SCNC: 137 MMOL/L (ref 135–145)
SODIUM SERPL-SCNC: 139 MMOL/L (ref 135–145)
WBC # BLD AUTO: 5.4 K/UL (ref 4.8–10.8)

## 2017-03-18 PROCEDURE — 99233 SBSQ HOSP IP/OBS HIGH 50: CPT | Performed by: INTERNAL MEDICINE

## 2017-03-18 PROCEDURE — 700111 HCHG RX REV CODE 636 W/ 250 OVERRIDE (IP): Performed by: INTERNAL MEDICINE

## 2017-03-18 PROCEDURE — 700102 HCHG RX REV CODE 250 W/ 637 OVERRIDE(OP): Performed by: HOSPITALIST

## 2017-03-18 PROCEDURE — 83735 ASSAY OF MAGNESIUM: CPT

## 2017-03-18 PROCEDURE — 93005 ELECTROCARDIOGRAM TRACING: CPT | Performed by: INTERNAL MEDICINE

## 2017-03-18 PROCEDURE — A9270 NON-COVERED ITEM OR SERVICE: HCPCS | Performed by: HOSPITALIST

## 2017-03-18 PROCEDURE — 85027 COMPLETE CBC AUTOMATED: CPT

## 2017-03-18 PROCEDURE — 700105 HCHG RX REV CODE 258: Performed by: INTERNAL MEDICINE

## 2017-03-18 PROCEDURE — 94760 N-INVAS EAR/PLS OXIMETRY 1: CPT

## 2017-03-18 PROCEDURE — 700111 HCHG RX REV CODE 636 W/ 250 OVERRIDE (IP): Performed by: HOSPITALIST

## 2017-03-18 PROCEDURE — 700102 HCHG RX REV CODE 250 W/ 637 OVERRIDE(OP): Performed by: PHARMACIST

## 2017-03-18 PROCEDURE — 700105 HCHG RX REV CODE 258

## 2017-03-18 PROCEDURE — A9270 NON-COVERED ITEM OR SERVICE: HCPCS | Performed by: PHARMACIST

## 2017-03-18 PROCEDURE — 307059 PAD,EAR PROTECTOR: Performed by: INTERNAL MEDICINE

## 2017-03-18 PROCEDURE — 93010 ELECTROCARDIOGRAM REPORT: CPT | Performed by: INTERNAL MEDICINE

## 2017-03-18 PROCEDURE — 82803 BLOOD GASES ANY COMBINATION: CPT

## 2017-03-18 PROCEDURE — 700101 HCHG RX REV CODE 250: Performed by: INTERNAL MEDICINE

## 2017-03-18 PROCEDURE — 700105 HCHG RX REV CODE 258: Performed by: HOSPITALIST

## 2017-03-18 PROCEDURE — 770020 HCHG ROOM/CARE - TELE (206)

## 2017-03-18 PROCEDURE — 36415 COLL VENOUS BLD VENIPUNCTURE: CPT

## 2017-03-18 PROCEDURE — 80048 BASIC METABOLIC PNL TOTAL CA: CPT

## 2017-03-18 PROCEDURE — 94640 AIRWAY INHALATION TREATMENT: CPT

## 2017-03-18 RX ORDER — SODIUM CHLORIDE 9 MG/ML
INJECTION, SOLUTION INTRAVENOUS CONTINUOUS
Status: DISPENSED | OUTPATIENT
Start: 2017-03-18 | End: 2017-03-19

## 2017-03-18 RX ORDER — IPRATROPIUM BROMIDE AND ALBUTEROL SULFATE 2.5; .5 MG/3ML; MG/3ML
3 SOLUTION RESPIRATORY (INHALATION)
Status: DISCONTINUED | OUTPATIENT
Start: 2017-03-18 | End: 2017-03-24 | Stop reason: HOSPADM

## 2017-03-18 RX ORDER — METHYLPREDNISOLONE SODIUM SUCCINATE 125 MG/2ML
62.5 INJECTION, POWDER, LYOPHILIZED, FOR SOLUTION INTRAMUSCULAR; INTRAVENOUS EVERY 8 HOURS
Status: DISCONTINUED | OUTPATIENT
Start: 2017-03-18 | End: 2017-03-22

## 2017-03-18 RX ORDER — LEVALBUTEROL INHALATION SOLUTION 0.63 MG/3ML
0.63 SOLUTION RESPIRATORY (INHALATION)
Status: DISCONTINUED | OUTPATIENT
Start: 2017-03-18 | End: 2017-03-19 | Stop reason: ALTCHOICE

## 2017-03-18 RX ADMIN — ACETAMINOPHEN 650 MG: 325 TABLET, FILM COATED ORAL at 00:33

## 2017-03-18 RX ADMIN — MEMANTINE HYDROCHLORIDE 10 MG: 10 TABLET ORAL at 22:37

## 2017-03-18 RX ADMIN — ROSUVASTATIN CALCIUM 125 MCG: 10 TABLET, FILM COATED ORAL at 00:05

## 2017-03-18 RX ADMIN — DABIGATRAN ETEXILATE MESYLATE 75 MG: 75 CAPSULE ORAL at 22:37

## 2017-03-18 RX ADMIN — IPRATROPIUM BROMIDE 0.5 MG: 0.5 SOLUTION RESPIRATORY (INHALATION) at 11:39

## 2017-03-18 RX ADMIN — CEFTRIAXONE 2 G: 2 INJECTION, POWDER, FOR SOLUTION INTRAMUSCULAR; INTRAVENOUS at 09:29

## 2017-03-18 RX ADMIN — AZITHROMYCIN 250 MG: 250 TABLET, FILM COATED ORAL at 09:27

## 2017-03-18 RX ADMIN — METHYLPREDNISOLONE SODIUM SUCCINATE 62.5 MG: 125 INJECTION, POWDER, FOR SOLUTION INTRAMUSCULAR; INTRAVENOUS at 09:27

## 2017-03-18 RX ADMIN — OMEPRAZOLE 20 MG: 20 CAPSULE, DELAYED RELEASE ORAL at 09:27

## 2017-03-18 RX ADMIN — MEMANTINE HYDROCHLORIDE 10 MG: 10 TABLET ORAL at 09:27

## 2017-03-18 RX ADMIN — STANDARDIZED SENNA CONCENTRATE AND DOCUSATE SODIUM 2 TABLET: 8.6; 5 TABLET, FILM COATED ORAL at 22:38

## 2017-03-18 RX ADMIN — METHYLPREDNISOLONE SODIUM SUCCINATE 62.5 MG: 125 INJECTION, POWDER, FOR SOLUTION INTRAMUSCULAR; INTRAVENOUS at 22:37

## 2017-03-18 RX ADMIN — LEVALBUTEROL 0.63 MG: 0.63 SOLUTION RESPIRATORY (INHALATION) at 21:39

## 2017-03-18 RX ADMIN — OXYBUTYNIN CHLORIDE 10 MG: 5 TABLET, FILM COATED, EXTENDED RELEASE ORAL at 09:27

## 2017-03-18 RX ADMIN — GUAIFENESIN AND DEXTROMETHORPHAN 10 ML: 100; 10 SYRUP ORAL at 00:06

## 2017-03-18 RX ADMIN — LEVALBUTEROL 0.63 MG: 0.63 SOLUTION RESPIRATORY (INHALATION) at 11:39

## 2017-03-18 RX ADMIN — SODIUM CHLORIDE 60 ML: 9 INJECTION, SOLUTION INTRAVENOUS at 17:33

## 2017-03-18 RX ADMIN — METHYLPREDNISOLONE SODIUM SUCCINATE 62.5 MG: 125 INJECTION, POWDER, FOR SOLUTION INTRAMUSCULAR; INTRAVENOUS at 13:03

## 2017-03-18 RX ADMIN — ROSUVASTATIN CALCIUM 125 MCG: 10 TABLET, FILM COATED ORAL at 17:33

## 2017-03-18 RX ADMIN — DABIGATRAN ETEXILATE MESYLATE 75 MG: 75 CAPSULE ORAL at 09:30

## 2017-03-18 RX ADMIN — DABIGATRAN ETEXILATE MESYLATE 75 MG: 75 CAPSULE ORAL at 00:05

## 2017-03-18 RX ADMIN — IPRATROPIUM BROMIDE 0.5 MG: 0.5 SOLUTION RESPIRATORY (INHALATION) at 15:33

## 2017-03-18 RX ADMIN — IPRATROPIUM BROMIDE 0.5 MG: 0.5 SOLUTION RESPIRATORY (INHALATION) at 21:39

## 2017-03-18 RX ADMIN — STANDARDIZED SENNA CONCENTRATE AND DOCUSATE SODIUM 2 TABLET: 8.6; 5 TABLET, FILM COATED ORAL at 09:27

## 2017-03-18 RX ADMIN — SODIUM CHLORIDE 1000 ML: 9 INJECTION, SOLUTION INTRAVENOUS at 00:04

## 2017-03-18 RX ADMIN — LEVALBUTEROL 0.63 MG: 0.63 SOLUTION RESPIRATORY (INHALATION) at 15:33

## 2017-03-18 ASSESSMENT — COPD QUESTIONNAIRES
HAVE YOU SMOKED AT LEAST 100 CIGARETTES IN YOUR ENTIRE LIFE: NO/DON'T KNOW
DO YOU EVER COUGH UP ANY MUCUS OR PHLEGM?: NO/ONLY WITH OCCASIONAL COLDS OR INFECTIONS
DURING THE PAST 4 WEEKS HOW MUCH DID YOU FEEL SHORT OF BREATH: NONE/LITTLE OF THE TIME
COPD SCREENING SCORE: 2

## 2017-03-18 ASSESSMENT — PAIN SCALES - GENERAL
PAINLEVEL_OUTOF10: 0

## 2017-03-18 ASSESSMENT — LIFESTYLE VARIABLES: EVER_SMOKED: NEVER

## 2017-03-18 NOTE — PROGRESS NOTES
Pt arrived to unit via hospital bed at 0800 as a higher level of care from Ortho. Pt oriented to room, unit, and plan of care. Pt is A&O x 1 (baseline) and needs frequent reminders and reinforcement of education. Tele-monitor placed and monitor room notified. All questions answered at this time. Call light within reach; fall precautions in place.

## 2017-03-18 NOTE — PROGRESS NOTES
Hospital Medicine Progress Note, Adult, Complex               Author: Jerodperla Garcia Date & Time created: 3/18/2017  4:36 PM     Interval History:  89 y/o female with CAP and a fib RVR    A fib-HR has been fluctuating quite a bit on ortho floor. From . Transfer to tele. Now on tele, apparently has been having 3-4 second pauses. All asymptomatic    CAP-lots of wheezing today. Coughing up stuff, patient feels that she is weak.     Review of Systems:  Review of Systems   Unable to perform ROS: dementia       Physical Exam:  Physical Exam   Constitutional: She appears well-developed and well-nourished. No distress.   HENT:   Head: Normocephalic and atraumatic.   Mouth/Throat: No oropharyngeal exudate.   Eyes: Pupils are equal, round, and reactive to light. Right eye exhibits no discharge. Left eye exhibits no discharge.   Neck: Normal range of motion. Neck supple.   Cardiovascular: Normal heart sounds and intact distal pulses.    No murmur heard.  Irregular irregular, highly variable HR   Pulmonary/Chest: Effort normal. No stridor. No respiratory distress. She has wheezes. She has rales.   Coughing up greenish sputum   Abdominal: Soft. Bowel sounds are normal. There is no tenderness.   Musculoskeletal: Normal range of motion. She exhibits no edema or tenderness.   Neurological: She is alert. No cranial nerve deficit.   X2, unclear what her baseline is   Skin: Skin is warm and dry. No rash noted.   Psychiatric: She has a normal mood and affect.   Nursing note and vitals reviewed.      Labs:  Recent Labs      03/18/17   0913   JQHPY46H  7.45   XELCVU772O  32.3   JGZIK859U  97.6*   EPBS9JDH  97.3   ARTHCO3  22   ARTBE  -1     Recent Labs      03/17/17   1756  03/17/17   2306   TROPONINI  0.03   --    BNPBTYPENAT   --   234*     Recent Labs      03/17/17   1730  03/18/17   0446   SODIUM  138  139   POTASSIUM  4.3  3.9   CHLORIDE  105  107   CO2  26  23   BUN  26*  19   CREATININE  0.99  0.77   CALCIUM  8.7  8.0*      Recent Labs      17   1730  17   0446   ALTSGPT  21   --    ASTSGOT  29   --    ALKPHOSPHAT  63   --    TBILIRUBIN  0.4   --    GLUCOSE  114*  104*     Recent Labs      17   1730  17   0446   RBC  4.61  4.12*   HEMOGLOBIN  13.0  11.6*   HEMATOCRIT  41.4  36.6*   PLATELETCT  127*  113*     Recent Labs      170  17   0446   WBC  7.6  5.4   NEUTSPOLYS  52.90   --    LYMPHOCYTES  39.40   --    MONOCYTES  6.70   --    EOSINOPHILS  0.40   --    BASOPHILS  0.30   --    ASTSGOT  29   --    ALTSGPT  21   --    ALKPHOSPHAT  63   --    TBILIRUBIN  0.4   --            Hemodynamics:  Temp (24hrs), Av.7 °C (92.7 °F), Min:2.3 °C (36.1 °F), Max:38.9 °C (102 °F)  Temperature: 37.6 °C (99.6 °F)  Pulse  Av.5  Min: 54  Max: 122Heart Rate (Monitored): 91  Blood Pressure : 148/75 mmHg, NIBP: 158/86 mmHg     Respiratory:    Respiration: 17, Pulse Oximetry: 91 %, O2 Daily Delivery Respiratory : Silicone Nasal Cannula     Given By:: Mask, Work Of Breathing / Effort: Moderate;Shallow  RUL Breath Sounds: Coarse Crackles;Expiratory Wheezes, RML Breath Sounds: Coarse Crackles;Expiratory Wheezes, RLL Breath Sounds: Coarse Crackles;Expiratory Wheezes, SAVAGE Breath Sounds: Coarse Crackles;Expiratory Wheezes, LLL Breath Sounds: Coarse Crackles;Expiratory Wheezes  Fluids:    Intake/Output Summary (Last 24 hours) at 17 1636  Last data filed at 17 1100   Gross per 24 hour   Intake    490 ml   Output     50 ml   Net    440 ml     Weight: 65.772 kg (145 lb)  GI/Nutrition:  Orders Placed This Encounter   Procedures   • Diet Order     Standing Status: Standing      Number of Occurrences: 1      Standing Expiration Date:      Order Specific Question:  Diet:     Answer:  Regular [1]     Medical Decision Making, by Problem:  Active Hospital Problems    Diagnosis   • *CAP (community acquired pneumonia) [J18.9]-continue with IV CTX and AZT  -start xopenex and atrovent RT inhalers  -monitor  closely  -Rapid flu negative   • Atrial fibrillation with RVR (CMS-HCC) [I48.91]-rapidly flucuating HR  -query underlying SSS and with sinus pauses now, certainly exacerbated by concurrent PNA  -continue dig, do not use BB or CCB given above  -might need cards consult, PPM if persists after above issue has been resolved  -monitor on tele  -repeat ECHO  -12 lead EKG review by me this AM shows a fib, somewhat slow, no e/o ST segment changes   • Sick sinus syndrome (CMS-HCC) [I49.5]-as above   • Sinus pause [I45.5]-as above   • Acute respiratory failure with hypoxia (CMS-[facility]) [J96.01]-treat as above   • Hemorrhagic disorder due to extrinsic circulating anticoagulants (CMS-[facility]) [D68.32]-continue pradaxa  -watch Cr levels closely   • Acute cystitis [N30.00]-IV CTX, F/U final urine culture, adjust abx's PRN   • Late onset Alzheimer's disease without behavioral disturbance [G30.1, F02.80]-unclear what her baseline is   • Atrial fibrillation, chronic (CMS-HCC) [I48.2]-as above   • Dyslipidemia [E78.5]-statin, question utility at this age   • HTN (hypertension), benign [I10]-well controlled  -avoid BB and CCB as outlined above       Labs reviewed and Medications reviewed  Gutiérrez catheter: No Gutiérrez      DVT: pradaxa.        Assessed for rehab: Patient returned to prior level of function, rehabilitation not indicated at this time

## 2017-03-18 NOTE — PROGRESS NOTES
Spoke to family regarding oxygen status with home oxygen, pt wears 3 L NC at home per daughter Isabel.

## 2017-03-18 NOTE — ED NOTES
Med rec complete per pt's daughter @ [phone]  Pt does not know all of the medications she takes and states  That her daughter helps her with them  Allergies reviewed  No ABX in last month  Pt was prescribed a 7 day course of doxycycline on 3/15/17  But pt's daughter was not able to  the medication from  The pharmacy until today, so the pt has not started it yet

## 2017-03-18 NOTE — ED NOTES
Floor called and notified of transport.  Arlen Cruz transported to T3 via gurney with transport. All personal belongings in possession.  NAD noted.

## 2017-03-18 NOTE — ED PROVIDER NOTES
ED Provider Note    CHIEF COMPLAINT  Chief Complaint   Patient presents with   • Altered Mental Status     increased confusion over last couple days   • Pneumonia     states dx 1 week ago, didn't start antibiotics she was prescribed    • Fever       HPI  Arlen Cruz is a 88 y.o. female who presents with increasing confusion over the past couple days, according to her family.  She started with a cough 4 days ago, other family members have similar cough.  Per the daughter, who is the patient's caretaker, the patient is prone to pneumonia.  Per the daughter, the patient has history of dementia and has difficulty giving history.  The patient noted fever today, septic protocol begun.  Patient denies chest pain.  Activity worsens or cough.  No acute new swelling in lower extremities.  The patient's daughter would like her admitted she is concerned regarding dyspnea and tachypnea she is noted at home in addition to the cough and fever    REVIEW OF SYSTEMS    Constitutional: Fever  Respiratory: Cough  Cardiac: Denies chest pain.  Atrial fibrillation.  Gastrointestinal: No vomiting  Musculoskeletal: No neck pain acutely  Neurologic: History of dementia.  Denies headache  Renal: History of renal insufficiency     All other systems are negative.       PAST MEDICAL HISTORY  Past Medical History   Diagnosis Date   • Stress incontinence 6/19/2009   • Dyslipidemia 6/19/2009   • HTN (hypertension), benign 6/19/2009   • OSTEOPOROSIS 6/19/2009   • Hyperlipidemia    • Atrial fibrillation (CMS-[facility])    • Psychiatric disorder    • Allergy, unspecified not elsewhere classified    • Arthritis    • Unspecified cataract    • CHF (congestive heart failure) (CMS-[facility])    • Chronic airway obstruction, not elsewhere classified (CMS-[facility])    • Depression    • GERD (gastroesophageal reflux disease)    • Kidney disease    • Stroke (CMS-[facility])    • Urinary tract infection, site not specified        FAMILY HISTORY  Family History   Problem  "Relation Age of Onset   • Hypertension Father    • Cancer Other    • Fainting Mother        SOCIAL HISTORY  Social History     Social History   • Marital Status:      Spouse Name: N/A   • Number of Children: N/A   • Years of Education: N/A     Social History Main Topics   • Smoking status: Never Smoker    • Smokeless tobacco: Never Used   • Alcohol Use: No   • Drug Use: No   • Sexual Activity: Not Currently     Other Topics Concern   • None     Social History Narrative       SURGICAL HISTORY  Past Surgical History   Procedure Laterality Date   • Cholecystectomy     • Abdominal hysterectomy total         CURRENT MEDICATIONS  Home Medications     Reviewed by Primitivo Taylor (Pharmacy Tech) on 03/17/17 at 2146  Med List Status: Complete    Medication Last Dose Status    ascorbic acid (ASCORBIC ACID) 500 MG Tab 3/17/2017 Active    digoxin (DIGOX) 125 MCG Tab 3/16/2017 Active    NAMENDA XR 28 MG CAPSULE SR 24 HR 3/17/2017 Active    omeprazole (PRILOSEC) 20 MG delayed-release capsule 3/17/2017 Active    PRADAXA 75 MG Cap capsule 3/17/2017 Active    VESICARE 10 MG tablet 3/17/2017 Active                ALLERGIES  Allergies   Allergen Reactions   • Hctz Unspecified     Hyponatremia     • Avelox [Moxifloxacin Hcl In Nacl]    • Codeine Rash             PHYSICAL EXAM  VITAL SIGNS: /81 mmHg  Pulse 105  Temp(Src) 38.1 °C (100.6 °F)  Resp 18  Ht 1.6 m (5' 3\")  Wt 65.772 kg (145 lb)  BMI 25.69 kg/m2  SpO2 99%  Constitutional:  Non-toxic appearance.   HENT: No facial swelling  Eyes: Anicteric, no conjunctivitis.     Cardiovascular: Normal heart rate, Normal rhythm  Pulmonary: Coarse bilateral wheezing, crackles in the left lung base.   Gastrointestinal: Soft, No tenderness, No masses  Skin: Warm, Dry, No cyanosis.  Some edema of the lower extremities, no asymmetry  Neurologic: Speech is clear, follows commands, facial expression is symmetrical.   strength symmetric.  Pedal push pull " equal  Psychiatric: At times calm, other times slightly agitated. Mood normal.   Musculoskeletal: Chest wall nontender.    EKG/labs  Results for orders placed or performed during the hospital encounter of 03/17/17   Lactic acid (lactate)   Result Value Ref Range    Lactic Acid 1.1 0.5 - 2.0 mmol/L   Lactic acid (lactate)   Result Value Ref Range    Lactic Acid 1.4 0.5 - 2.0 mmol/L   CBC WITH DIFFERENTIAL   Result Value Ref Range    WBC 7.6 4.8 - 10.8 K/uL    RBC 4.61 4.20 - 5.40 M/uL    Hemoglobin 13.0 12.0 - 16.0 g/dL    Hematocrit 41.4 37.0 - 47.0 %    MCV 89.8 81.4 - 97.8 fL    MCH 28.2 27.0 - 33.0 pg    MCHC 31.4 (L) 33.6 - 35.0 g/dL    RDW 49.2 35.9 - 50.0 fL    Platelet Count 127 (L) 164 - 446 K/uL    MPV 11.6 9.0 - 12.9 fL    Neutrophils-Polys 52.90 44.00 - 72.00 %    Lymphocytes 39.40 22.00 - 41.00 %    Monocytes 6.70 0.00 - 13.40 %    Eosinophils 0.40 0.00 - 6.90 %    Basophils 0.30 0.00 - 1.80 %    Immature Granulocytes 0.30 0.00 - 0.90 %    Nucleated RBC 0.00 /100 WBC    Neutrophils (Absolute) 4.04 2.00 - 7.15 K/uL    Lymphs (Absolute) 3.00 1.00 - 4.80 K/uL    Monos (Absolute) 0.51 0.00 - 0.85 K/uL    Eos (Absolute) 0.03 0.00 - 0.51 K/uL    Baso (Absolute) 0.02 0.00 - 0.12 K/uL    Immature Granulocytes (abs) 0.02 0.00 - 0.11 K/uL    NRBC (Absolute) 0.00 K/uL   COMP METABOLIC PANEL   Result Value Ref Range    Sodium 138 135 - 145 mmol/L    Potassium 4.3 3.6 - 5.5 mmol/L    Chloride 105 96 - 112 mmol/L    Co2 26 20 - 33 mmol/L    Anion Gap 7.0 0.0 - 11.9    Glucose 114 (H) 65 - 99 mg/dL    Bun 26 (H) 8 - 22 mg/dL    Creatinine 0.99 0.50 - 1.40 mg/dL    Calcium 8.7 8.5 - 10.5 mg/dL    AST(SGOT) 29 12 - 45 U/L    ALT(SGPT) 21 2 - 50 U/L    Alkaline Phosphatase 63 30 - 99 U/L    Total Bilirubin 0.4 0.1 - 1.5 mg/dL    Albumin 3.8 3.2 - 4.9 g/dL    Total Protein 6.8 6.0 - 8.2 g/dL    Globulin 3.0 1.9 - 3.5 g/dL    A-G Ratio 1.3 g/dL   URINALYSIS   Result Value Ref Range    Micro Urine Req Microscopic     Color  Lt. Yellow     Character Sl Cloudy (A)     Specific Gravity 1.019 <1.035    Ph 5.0 5.0-8.0    Glucose Negative Negative mg/dL    Ketones Negative Negative mg/dL    Protein Negative Negative mg/dL    Bilirubin Negative Negative    Nitrite Negative Negative    Leukocyte Esterase Large (A) Negative    Occult Blood Negative Negative   BTYPE NATRIURETIC PEPTIDE   Result Value Ref Range    B Natriuretic Peptide 234 (H) 0 - 100 pg/mL   TROPONIN   Result Value Ref Range    Troponin I 0.03 0.00 - 0.04 ng/mL   ESTIMATED GFR   Result Value Ref Range    GFR If African American >60 >60 mL/min/1.73 m 2    GFR If Non  53 (A) >60 mL/min/1.73 m 2   Influenza Rapid   Result Value Ref Range    Significant Indicator NEG     Source RESP     Site RESPIRATORY     Rapid Influenza A-B       Negative for Influenza A and Influenza B antigens.  Infection due to influenza A or B cannot be ruled out  since the antigen present in the specimen may be below the  detection limit of the test. Culture confirmation of  negative samples is recommended.     Influenza By PCR, A/B/H1N1   Result Value Ref Range    Influenza virus A RNA Negative Negative    Influenza virus B RNA Negative Negative    Influenza A 2009, H1N1, PCR Not Detected Negative   URINE MICROSCOPIC (W/UA)   Result Value Ref Range    WBC 20-50 (A) /hpf    RBC 5-10 (A) /hpf    Bacteria Few (A) None /hpf    Epithelial Cells Few /hpf    Mucous Threads Few /hpf   LACTIC ACID   Result Value Ref Range    Lactic Acid 1.6 0.5 - 2.0 mmol/L   EKG (ER)   Result Value Ref Range    Report       [facility] Emergency Dept.    Test Date:  2017  Pt Name:    FABIANO MOSER DARIAN    Department: ER  MRN:        [id number]                      Room:       T333  Gender:     F                            Technician: [id number]  :        [id number]                   Requested By:ER TRIAGE PROTOCOL  Order #:    [id number]                    Reading MD: HECTOR CUMMINGS,  MD    Measurements  Intervals                                Axis  Rate:       100                          P:  IN:                                      QRS:        47  QRSD:       76                           T:          245  QT:         296  QTc:        382    Interpretive Statements  ATRIAL FIBRILLATION  BORDERLINE LOW VOLTAGE IN FRONTAL LEADS  BORDERLINE REPOLARIZATION ABNORMALITY  BASELINE WANDER IN LEAD(S) V4  Compared to ECG 03/01/2014 07:46:00  T-wave abnormality no longer present  ST (T wave) deviation no longer present    Electronically Signed On 3- 0:04:30 PDT by HECTOR BAKER MD           RADIOLOGY/PROCEDURES  DX-CHEST-PORTABLE (1 VIEW)   Final Result      New perihilar peribronchial opacification may indicate acute or chronic bronchitis favored over reactive airway disease      Stable right upper lobe volume loss most likely indicating scarring, possibly from prior fungal disease or tuberculosis. Underlying malignant nodule is not excluded but given no definite change from 2012 is therefore unlikely            COURSE & MEDICAL DECISION MAKING  Pertinent Labs & Imaging studies reviewed. (See chart for details)  Patient has urinary tract infection.  Initial concern for pneumonia, patient was given both Rocephin and Zithromax.  Patient has abnormal lung sounds in the left base which I suspect is the start of pneumonia.  With the patient's atrial fibrillation, cardiac history and elevated BNP, no large fluid bolus was given.  Patient was negative on her lactic acid, sepsis unlikely.  Patient is admitted for ongoing workup and medical stabilization.  She did receive albuterol and Atrovent breathing treatment in the ER.  Patient also hypoxic without extra supplemental oxygen.    FINAL IMPRESSION     1. Fever, unspecified fever cause    2. Cough    3. Acute exacerbation of chronic obstructive pulmonary disease (COPD) (CMS-HCC)    4. Chronic atrial fibrillation (CMS-HCC)    5. Hypoxia                     Electronically signed by: Kota Aldana, 3/18/2017 12:04 AM

## 2017-03-18 NOTE — H&P
REASON FOR EVALUATION:  Cough.  She was brought in via family.      PRIMARY CARE PROVIDER:  Dr. Stuart Santacruz.      HISTORY OF PRESENTING ILLNESS:  An 88-year-old female brought in to the   emergency room via the family because of cough ongoing for the last 4 days.    All history obtained via the chart as the patient has history of dementia.    Patient does have history of COPD, chronic atrial fibrillation, dyslipidemia,   hypertension, osteoporosis, overactive bladder.  She was seen and evaluated in   the emergency room.  She was short of breath and hypoxic, given breathing   treatments that did improve her oxygenation.  X-ray showed evidence findings   _____ of bronchitis, chills and has evidence of urinary tract infection.    Patient will be admitted for such.  Patient is still in atrial fibrillation.      PAST MEDICAL HISTORY: Includes:    1.  Recurrent bouts of pneumonia.    2.  History of chronic obstructive pulmonary disease.   3.  History of atrial fibrillation.    4.  History of dyslipidemia.    5.  History of hypertension.    6.  History of osteoporosis.    7.  History of overactive bladder.    8.  History of dementia.    9.  History of recurrent UTIs.      PAST SURGICAL HISTORY:  Hysterectomy and cholecystectomy.    SOCIAL HISTORY:  Does not smoke, does not drink.  She apparently was exposed   to secondhand smoke.  She is followed by Dr. Stuart Santacruz.      FAMILY HISTORY:  Unobtainable.  The patient is demented.      REVIEW OF SYSTEMS:  Unobtainable.  The patient is demented.      ALLERGIES:  CODEINE, HYDROCHLOROTHIAZIDE.      OUTPATIENT MEDICATIONS:  Doxycycline 100 mg b.i.d. that was started by Dr. Snatacruz 2 days ago, Pradaxa 75 mg b.i.d., VESIcare 10 mg daily, Exelon patch,   Keppra 125 mg b.i.d., Namenda XR 28 mg daily, vitamin B12 5000 units daily,   Prilosec 20 mg daily, digoxin 125 mcg daily, vitamin D 2000 units daily and   MiraLax 1 capful daily.       PHYSICAL EXAMINATION:  VITAL SIGNS:  Blood  pressure 130/83, respiration of 18, pulse is 107 and   temperature 38.9.    GENERAL:  This is a pleasant elderly female, not in acute distress.    HEENT:  Sclerae are anicteric.  Extraocular movements intact.  Oral mucosa is   moist.    NECK:  Supple.    HEART:  S1, S2 tachy.  No murmurs appreciated.    LUNGS:  She has mild bilateral rhonchi.  No rales or wheezing noted at this   time.    ABDOMEN:  Soft, nontender, positive bowel sounds appreciated.    EXTREMITIES:  No edema noted, no cyanosis, no clubbing.    NEUROLOGIC:  She is awake, nonfocal, generalized weakness.    LABORATORY DATA:  Urinalysis yellow, cloudy with large esterase, 20-50 wbc's.    White count 7, hemoglobin is 13, hematocrit 41 and platelet 127.  Sodium 138,   potassium is 4.3, glucose 114, BUN of _____.  Lactic acid is 1.1.      IMAGING:  X-ray of chest shows peribronchial opacification.      IMPRESSION:    1.  Febrile illness.    2.  Acute bronchitis with possible early pneumonia.    3.  History of congestive heart failure.   4.  Chronic obstructive pulmonary disease.    5.  Urinary tract infection.    6.  History of dementia.    7.  History of chronic atrial fibrillation.      PLAN:  Tylenol for fever.  Follow up on blood cultures.  If the patient   produces any sputum, we will sent that for culture.  Patient given antibiotics   of Rocephin 2 grams q. 24 hours and Zithromax by mouth 20 mg daily.  Patient   will be on respiratory protocol for oxygen and bronchodilators.  Patient   started on prednisone 40 mg daily for history of chronic obstructive pulmonary   disease.  Continue patient's previous outpatient medications of Pradaxa and   digoxin for history of chronic atrial fibrillation.  Continue patient's Keppra   for history of seizure.  Patient's urinary tract infection should be treated   with the above Rocephin.  Follow up on urine culture.  Based on patient's   presentation and plan of care, we do anticipate 2-midnight stay for this    patient.       ____________________________________     MD ANNIE GUERRERO    DD:  03/17/2017 21:12:35  DT:  03/17/2017 21:31:51    D#:  [id number]  Job#:  [id number]

## 2017-03-18 NOTE — PROGRESS NOTES
Pt arrived to the unit via transport on a gurney, unable to ambulate to the bed, slide board was utilized, attempted to complete admit profile but pt is only oriented to self and is a poor historian. Bed alarm on and intact, pt on 3L nasal cannula.

## 2017-03-18 NOTE — CARE PLAN
Problem: Communication  Goal: The ability to communicate needs accurately and effectively will improve  Outcome: PROGRESSING SLOWER THAN EXPECTED  Educated patient on plan of care. Updated white board. All questions answered.     Problem: Safety  Goal: Will remain free from falls  Outcome: PROGRESSING AS EXPECTED  Pt remained free from falls during shift. Pt oriented to call light, bed in low position and wheels locked. Pt encouraged to call for assistance. Bed alarm on and intact.

## 2017-03-18 NOTE — CARE PLAN
Problem: Safety  Goal: Will remain free from injury  Intervention: Collaborate with Interdisciplinary Team for safe transfer and mobilization techniques  Fall precautions in place. Bed in lowest position. Non-skid socks in place. Personal possessions within reach. Mobility sign on door. Bed-alarm on. Call light within reach. Pt educated regarding fall prevention and needs frequent reinforcement.       Problem: Skin Integrity  Goal: Risk for impaired skin integrity will decrease  Intervention: Assess risk factors for impaired skin integrity and/or pressure ulcers  Q. 2 hour turns preformed, barrier protectant used. Pt educated on the importance of repositioning to prevent skin breakdown. Needs reinforcement. Frequent monitoring of skin integrity.

## 2017-03-18 NOTE — PROGRESS NOTES
2 RN skin check performed with GRETA Johnson. Pt has red, blanching ears bilat and heels bilat. Silicone 02 tubing and ear pads in place. Heels floated with pillows. Mild IAD in rhona area. Otherwise skin is intact. Pt has also been made q2 turns.

## 2017-03-18 NOTE — RESPIRATORY CARE
COPD EDUCATION by COPD CLINICAL EDUCATOR  3/18/2017 at 6:59 AM by Josefina Dupree     Patient reviewed by COPD education team. Patient does not qualify for COPD program.

## 2017-03-18 NOTE — ED NOTES
Pt alert to person, baseline confused, family reports dementia.  Pt resting on gurney, IV abx infusing.  NAD noted.

## 2017-03-19 LAB
ANION GAP SERPL CALC-SCNC: 10 MMOL/L (ref 0–11.9)
BACTERIA UR CULT: NORMAL
BASOPHILS # BLD AUTO: 0.1 % (ref 0–1.8)
BASOPHILS # BLD: 0.01 K/UL (ref 0–0.12)
BUN SERPL-MCNC: 27 MG/DL (ref 8–22)
CALCIUM SERPL-MCNC: 8.8 MG/DL (ref 8.5–10.5)
CHLORIDE SERPL-SCNC: 108 MMOL/L (ref 96–112)
CO2 SERPL-SCNC: 22 MMOL/L (ref 20–33)
CREAT SERPL-MCNC: 0.85 MG/DL (ref 0.5–1.4)
EOSINOPHIL # BLD AUTO: 0 K/UL (ref 0–0.51)
EOSINOPHIL NFR BLD: 0 % (ref 0–6.9)
ERYTHROCYTE [DISTWIDTH] IN BLOOD BY AUTOMATED COUNT: 47.7 FL (ref 35.9–50)
GFR SERPL CREATININE-BSD FRML MDRD: >60 ML/MIN/1.73 M 2
GLUCOSE SERPL-MCNC: 159 MG/DL (ref 65–99)
HCT VFR BLD AUTO: 40.7 % (ref 37–47)
HGB BLD-MCNC: 13 G/DL (ref 12–16)
IMM GRANULOCYTES # BLD AUTO: 0.04 K/UL (ref 0–0.11)
IMM GRANULOCYTES NFR BLD AUTO: 0.6 % (ref 0–0.9)
LV EJECT FRACT  99904: 55
LV EJECT FRACT MOD 2C 99903: 51.17
LV EJECT FRACT MOD 4C 99902: 54.83
LV EJECT FRACT MOD BP 99901: 54.76
LYMPHOCYTES # BLD AUTO: 1.77 K/UL (ref 1–4.8)
LYMPHOCYTES NFR BLD: 24.5 % (ref 22–41)
MAGNESIUM SERPL-MCNC: 1.9 MG/DL (ref 1.5–2.5)
MCH RBC QN AUTO: 28 PG (ref 27–33)
MCHC RBC AUTO-ENTMCNC: 31.9 G/DL (ref 33.6–35)
MCV RBC AUTO: 87.5 FL (ref 81.4–97.8)
MONOCYTES # BLD AUTO: 0.14 K/UL (ref 0–0.85)
MONOCYTES NFR BLD AUTO: 1.9 % (ref 0–13.4)
NEUTROPHILS # BLD AUTO: 5.25 K/UL (ref 2–7.15)
NEUTROPHILS NFR BLD: 72.9 % (ref 44–72)
NRBC # BLD AUTO: 0 K/UL
NRBC BLD AUTO-RTO: 0 /100 WBC
PLATELET # BLD AUTO: 133 K/UL (ref 164–446)
PMV BLD AUTO: 11.9 FL (ref 9–12.9)
POTASSIUM SERPL-SCNC: 4.1 MMOL/L (ref 3.6–5.5)
RBC # BLD AUTO: 4.65 M/UL (ref 4.2–5.4)
SIGNIFICANT IND 70042: NORMAL
SITE SITE: NORMAL
SODIUM SERPL-SCNC: 140 MMOL/L (ref 135–145)
SOURCE SOURCE: NORMAL
WBC # BLD AUTO: 7.2 K/UL (ref 4.8–10.8)

## 2017-03-19 PROCEDURE — 700111 HCHG RX REV CODE 636 W/ 250 OVERRIDE (IP): Performed by: HOSPITALIST

## 2017-03-19 PROCEDURE — 770020 HCHG ROOM/CARE - TELE (206)

## 2017-03-19 PROCEDURE — 94640 AIRWAY INHALATION TREATMENT: CPT

## 2017-03-19 PROCEDURE — 700102 HCHG RX REV CODE 250 W/ 637 OVERRIDE(OP): Performed by: HOSPITALIST

## 2017-03-19 PROCEDURE — 93306 TTE W/DOPPLER COMPLETE: CPT

## 2017-03-19 PROCEDURE — 93306 TTE W/DOPPLER COMPLETE: CPT | Mod: 26 | Performed by: INTERNAL MEDICINE

## 2017-03-19 PROCEDURE — 700111 HCHG RX REV CODE 636 W/ 250 OVERRIDE (IP): Performed by: INTERNAL MEDICINE

## 2017-03-19 PROCEDURE — 99232 SBSQ HOSP IP/OBS MODERATE 35: CPT | Performed by: INTERNAL MEDICINE

## 2017-03-19 PROCEDURE — A9270 NON-COVERED ITEM OR SERVICE: HCPCS | Performed by: HOSPITALIST

## 2017-03-19 PROCEDURE — 83735 ASSAY OF MAGNESIUM: CPT

## 2017-03-19 PROCEDURE — A9270 NON-COVERED ITEM OR SERVICE: HCPCS | Performed by: PHARMACIST

## 2017-03-19 PROCEDURE — 700105 HCHG RX REV CODE 258: Performed by: HOSPITALIST

## 2017-03-19 PROCEDURE — 700102 HCHG RX REV CODE 250 W/ 637 OVERRIDE(OP): Performed by: PHARMACIST

## 2017-03-19 PROCEDURE — 80048 BASIC METABOLIC PNL TOTAL CA: CPT

## 2017-03-19 PROCEDURE — 700101 HCHG RX REV CODE 250: Performed by: INTERNAL MEDICINE

## 2017-03-19 PROCEDURE — 94760 N-INVAS EAR/PLS OXIMETRY 1: CPT

## 2017-03-19 PROCEDURE — 36415 COLL VENOUS BLD VENIPUNCTURE: CPT

## 2017-03-19 PROCEDURE — 85025 COMPLETE CBC W/AUTO DIFF WBC: CPT

## 2017-03-19 RX ADMIN — METHYLPREDNISOLONE SODIUM SUCCINATE 62.5 MG: 125 INJECTION, POWDER, FOR SOLUTION INTRAMUSCULAR; INTRAVENOUS at 06:29

## 2017-03-19 RX ADMIN — AZITHROMYCIN 250 MG: 250 TABLET, FILM COATED ORAL at 10:29

## 2017-03-19 RX ADMIN — IPRATROPIUM BROMIDE 0.5 MG: 0.5 SOLUTION RESPIRATORY (INHALATION) at 14:53

## 2017-03-19 RX ADMIN — DABIGATRAN ETEXILATE MESYLATE 75 MG: 75 CAPSULE ORAL at 21:01

## 2017-03-19 RX ADMIN — IPRATROPIUM BROMIDE 0.5 MG: 0.5 SOLUTION RESPIRATORY (INHALATION) at 11:09

## 2017-03-19 RX ADMIN — METHYLPREDNISOLONE SODIUM SUCCINATE 62.5 MG: 125 INJECTION, POWDER, FOR SOLUTION INTRAMUSCULAR; INTRAVENOUS at 13:22

## 2017-03-19 RX ADMIN — GUAIFENESIN AND DEXTROMETHORPHAN 10 ML: 100; 10 SYRUP ORAL at 10:34

## 2017-03-19 RX ADMIN — METHYLPREDNISOLONE SODIUM SUCCINATE 62.5 MG: 125 INJECTION, POWDER, FOR SOLUTION INTRAMUSCULAR; INTRAVENOUS at 21:02

## 2017-03-19 RX ADMIN — IPRATROPIUM BROMIDE 0.5 MG: 0.5 SOLUTION RESPIRATORY (INHALATION) at 00:05

## 2017-03-19 RX ADMIN — STANDARDIZED SENNA CONCENTRATE AND DOCUSATE SODIUM 2 TABLET: 8.6; 5 TABLET, FILM COATED ORAL at 21:03

## 2017-03-19 RX ADMIN — OXYBUTYNIN CHLORIDE 10 MG: 5 TABLET, FILM COATED, EXTENDED RELEASE ORAL at 10:29

## 2017-03-19 RX ADMIN — LEVALBUTEROL 0.63 MG: 0.63 SOLUTION RESPIRATORY (INHALATION) at 00:05

## 2017-03-19 RX ADMIN — DABIGATRAN ETEXILATE MESYLATE 75 MG: 75 CAPSULE ORAL at 10:29

## 2017-03-19 RX ADMIN — ROSUVASTATIN CALCIUM 125 MCG: 10 TABLET, FILM COATED ORAL at 17:04

## 2017-03-19 RX ADMIN — LEVALBUTEROL 0.63 MG: 0.63 SOLUTION RESPIRATORY (INHALATION) at 14:53

## 2017-03-19 RX ADMIN — LEVALBUTEROL 0.63 MG: 0.63 SOLUTION RESPIRATORY (INHALATION) at 07:08

## 2017-03-19 RX ADMIN — IPRATROPIUM BROMIDE 0.5 MG: 0.5 SOLUTION RESPIRATORY (INHALATION) at 07:08

## 2017-03-19 RX ADMIN — LEVALBUTEROL 0.63 MG: 0.63 SOLUTION RESPIRATORY (INHALATION) at 03:25

## 2017-03-19 RX ADMIN — CEFTRIAXONE 2 G: 2 INJECTION, POWDER, FOR SOLUTION INTRAMUSCULAR; INTRAVENOUS at 10:26

## 2017-03-19 RX ADMIN — LEVALBUTEROL 0.63 MG: 0.63 SOLUTION RESPIRATORY (INHALATION) at 11:09

## 2017-03-19 RX ADMIN — OMEPRAZOLE 20 MG: 20 CAPSULE, DELAYED RELEASE ORAL at 10:29

## 2017-03-19 RX ADMIN — IPRATROPIUM BROMIDE 0.5 MG: 0.5 SOLUTION RESPIRATORY (INHALATION) at 03:25

## 2017-03-19 RX ADMIN — MEMANTINE HYDROCHLORIDE 10 MG: 10 TABLET ORAL at 21:03

## 2017-03-19 RX ADMIN — MEMANTINE HYDROCHLORIDE 10 MG: 10 TABLET ORAL at 10:29

## 2017-03-19 ASSESSMENT — PAIN SCALES - GENERAL
PAINLEVEL_OUTOF10: 0

## 2017-03-19 NOTE — CARE PLAN
Problem: Communication  Goal: The ability to communicate needs accurately and effectively will improve  Outcome: PROGRESSING SLOWER THAN EXPECTED  Pt alert and confused to place, time, situation. Reinforcement with teaching needed.    Problem: Safety  Goal: Will remain free from injury  Outcome: PROGRESSING AS EXPECTED  Pt remained free from a fall this shift.

## 2017-03-19 NOTE — PROGRESS NOTES
12 hour chart check     Monitor Summary:     A-Fib     2.0, 2.2, 2.4 sec pauses Dr. Francisco aware     -/.06/-

## 2017-03-19 NOTE — PROGRESS NOTES
Assumed care of patient at 0715, received bedside report from night shift RN. Bed is locked and in lowest position with call light within reach. Patient updated on plan of care, no complaints or pain at this time. White board updated. Assessment completed. Pt A&Ox1- disoriented to time, place and situation. Q2 turns in place. Tele monitor in place and cardiac rhythm being monitored. All needs met at this time.

## 2017-03-19 NOTE — CARE PLAN
Problem: Safety  Goal: Will remain free from injury  Outcome: PROGRESSING AS EXPECTED  Bed is locked and in lowest position with treaded socks on and call light within reach. Patient educated regarding safety. Verbalizes understanding and calls appropriately for assistance. Strip alarm on.    Problem: Knowledge Deficit  Goal: Knowledge of disease process/condition, treatment plan, diagnostic tests, and medications will improve  Outcome: PROGRESSING SLOWER THAN EXPECTED  Pt educated on POC, current medications and doses and disease process. All questions answered.

## 2017-03-19 NOTE — CONSULTS
Cardiology Consult Note:    Ana Luisa Lamar  Date & Time note created:    3/19/2017   11:01 AM     Referring MD:  Dr. Albarran    Patient ID:   Name:             Arlen Cruz     YOB: 1929  Age:                 88 y.o.  female   MRN:               [id number]                                                             Chief Complaint:      AFib with >2 sec pauses    History of Present Illness:    89 y/o female with moderate dementia; admitted for CAP and a fib RVR; found to have asx 2.4 sec pauses while awake; Consulted us for above    Review of Systems:      Constitutional: Denies fevers, Denies weight changes  Eyes: Denies changes in vision, no eye pain  Ears/Nose/Throat/Mouth: Denies nasal congestion or sore throat   Cardiovascular: - chest pain, - palpitations   Respiratory: - shortness of breath , Denies cough  Gastrointestinal/Hepatic: Denies abdominal pain, nausea, vomiting, diarrhea, constipation or GI bleeding   Genitourinary: Denies dysuria or frequency  Musculoskeletal/Rheum: Denies  joint pain and swelling   Skin: Denies rash  Neurological: Denies headache, confusion, memory loss or focal weakness/parasthesias  Psychiatric: denies mood disorder   Endocrine: Cate thyroid problems  Heme/Oncology/Lymph Nodes: Denies enlarged lymph nodes, denies brusing or known bleeding disorder  All other systems were reviewed and are negative (AMA/CMS criteria)                Past Medical History:   Past Medical History   Diagnosis Date   • Stress incontinence 6/19/2009   • Dyslipidemia 6/19/2009   • HTN (hypertension), benign 6/19/2009   • OSTEOPOROSIS 6/19/2009   • Hyperlipidemia    • Atrial fibrillation (CMS-[facility])    • Psychiatric disorder    • Allergy, unspecified not elsewhere classified    • Arthritis    • Unspecified cataract    • CHF (congestive heart failure) (CMS-[facility])    • Chronic airway obstruction, not elsewhere classified (CMS-[facility])    • Depression    • GERD (gastroesophageal reflux  disease)    • Kidney disease    • Stroke (CMS-HCC)    • Urinary tract infection, site not specified      Active Hospital Problems    Diagnosis   • CAP (community acquired pneumonia) [J18.9]     Priority: High   • Atrial fibrillation with RVR (CMS-[facility]) [I48.91]   • Sick sinus syndrome (CMS-[facility]) [I49.5]   • Sinus pause [I45.5]   • Acute respiratory failure with hypoxia (CMS-[facility]) [J96.01]   • Hemorrhagic disorder due to extrinsic circulating anticoagulants (CMS-HCC) [D68.32]   • Acute cystitis [N30.00]   • Late onset Alzheimer's disease without behavioral disturbance [G30.1, F02.80]   • Atrial fibrillation, chronic (CMS-HCC) [I48.2]   • Dyslipidemia [E78.5]   • HTN (hypertension), benign [I10]       Past Surgical History:  Past Surgical History   Procedure Laterality Date   • Cholecystectomy     • Abdominal hysterectomy total         Hospital Medications:    Current facility-administered medications:   •  ipratropium-albuterol (DUONEB) nebulizer solution 3 mL, 3 mL, Nebulization, Q4H PRN (RT), Rosales Cormier M.D.  •  levalbuterol (XOPENEX) 0.63 MG/3ML nebulizer solution 0.63 mg, 0.63 mg, Nebulization, Q4HRS (RT), Jerod Garcia M.D., 0.63 mg at 03/19/17 0708  •  ipratropium (ATROVENT) 0.02 % nebulizer solution 0.5 mg, 0.5 mg, Nebulization, Q4HRS (RT), Jerod Garcia M.D., 0.5 mg at 03/19/17 0708  •  methylPREDNISolone sod succ (SOLU-MEDROL) 125 MG injection 62.5 mg, 62.5 mg, Intravenous, Q8HRS, Jerod Garcia M.D., 62.5 mg at 03/19/17 0629  •  digoxin (LANOXIN) tablet 125 mcg, 125 mcg, Oral, DAILY AT 1800, Rosales Cormier M.D., 125 mcg at 03/18/17 1733  •  dabigatran (PRADAXA) capsule 75 mg, 75 mg, Oral, BID, Rosales Cormier M.D., 75 mg at 03/19/17 1029  •  cefTRIAXone (ROCEPHIN) 2 g in  mL IVPB, 2 g, Intravenous, Q24HRS, Rosales Cormier M.D., Last Rate: 200 mL/hr at 03/19/17 1026, 2 g at 03/19/17 1026  •  azithromycin (ZITHROMAX) tablet 250 mg, 250 mg, Oral, DAILY, Rosales Cormier M.D., 250 mg at  03/19/17 1029  •  senna-docusate (PERICOLACE or SENOKOT S) 8.6-50 MG per tablet 2 Tab, 2 Tab, Oral, BID, 2 Tab at 03/18/17 2238 **AND** polyethylene glycol/lytes (MIRALAX) PACKET 1 Packet, 1 Packet, Oral, QDAY PRN **AND** magnesium hydroxide (MILK OF MAGNESIA) suspension 30 mL, 30 mL, Oral, QDAY PRN **AND** bisacodyl (DULCOLAX) suppository 10 mg, 10 mg, Rectal, QDAY PRN, Rosales Cormier M.D.  •  Respiratory Care per Protocol, , Nebulization, Continuous RT, Rosales Cormier M.D.  •  ondansetron (ZOFRAN) syringe/vial injection 4 mg, 4 mg, Intravenous, Q4HRS PRN, Rosales Cormier M.D.  •  ondansetron (ZOFRAN ODT) dispertab 4 mg, 4 mg, Oral, Q4HRS PRN, Rosales Cormier M.D.  •  guaifenesin DM (ROBITUSSIN DM) 100-10 MG/5ML syrup 10 mL, 10 mL, Oral, Q6HRS PRN, Rosales Cormier M.D., 10 mL at 03/19/17 1034  •  acetaminophen (TYLENOL) tablet 650 mg, 650 mg, Oral, Q6HRS PRN, Rosales Cormier M.D., 650 mg at 03/18/17 0033  •  memantine (NAMENDA) tablet 10 mg, 10 mg, Oral, BID, FUNMI LealD, 10 mg at 03/19/17 1029  •  omeprazole (PRILOSEC) capsule 20 mg, 20 mg, Oral, DAILY, Rosales Cormier M.D., 20 mg at 03/19/17 1029  •  oxybutynin SR (DITROPAN-XL) tablet 10 mg, 10 mg, Oral, DAILY, Kory Hill, PHARMD, 10 mg at 03/19/17 1029    Current Outpatient Medications:  Prescriptions prior to admission   Medication Sig Dispense Refill Last Dose   • ascorbic acid (ASCORBIC ACID) 500 MG Tab Take 500 mg by mouth every day.   3/17/2017 at am   • PRADAXA 75 MG Cap capsule TAKE 1 CAPSULE BY MOUTH TWICE DAILY 60 Cap 3 3/17/2017 at am   • VESICARE 10 MG tablet TAKE 1 TABLET BY MOUTH EVERY DAY 30 Tab 11 3/17/2017 at am   • NAMENDA XR 28 MG CAPSULE SR 24 HR TAKE 1 CAPSULE BY MOUTH ONCE DAILY 30 Cap 6 3/17/2017 at am   • omeprazole (PRILOSEC) 20 MG delayed-release capsule TAKE ONE CAPSULE BY MOUTH ONCE DAILY 30 Cap 11 3/17/2017 at am   • digoxin (DIGOX) 125 MCG Tab Take 1 Tab by mouth every day. 30 Tab 11 3/16/2017 at pm       Medication  "Allergy:  Allergies   Allergen Reactions   • Hctz Unspecified     Hyponatremia     • Avelox [Moxifloxacin Hcl In Nacl]    • Codeine Rash             Family History:  Family History   Problem Relation Age of Onset   • Hypertension Father    • Cancer Other    • Fainting Mother        Social History:  Social History     Social History   • Marital Status:      Spouse Name: N/A   • Number of Children: N/A   • Years of Education: N/A     Occupational History   • Not on file.     Social History Main Topics   • Smoking status: Never Smoker    • Smokeless tobacco: Never Used   • Alcohol Use: No   • Drug Use: No   • Sexual Activity: Not Currently     Other Topics Concern   • Not on file     Social History Narrative         Physical Exam:  Vitals  Weight/BMI: Body mass index is 25.78 kg/(m^2).  Blood pressure 100/69, pulse 110, temperature 37 °C (98.6 °F), resp. rate 18, height 1.6 m (5' 3\"), weight 66 kg (145 lb 8.1 oz), SpO2 96 %.  Filed Vitals:    03/19/17 0327 03/19/17 0409 03/19/17 0708 03/19/17 0720   BP:  113/73  100/69   Pulse:  73 67 110   Temp:  36.7 °C (98.1 °F)  37 °C (98.6 °F)   Resp: 18 18 17 18   Height:       Weight:       SpO2:  90% 94% 96%     Oxygen Therapy:  Pulse Oximetry: 96 %, O2 (LPM): 3, O2 Delivery: Silicone Nasal Cannula  General Appearance:  Slow mentation and hard of hearing;  Well developed, Well nourished, No acute distress, Non-toxic appearance.   HENT:  Normocephalic, Atraumatic, Oropharynx moist mucous membranes, Dentition: , Nose normal.    Eyes:  PERRLA, EOMI, Conjunctiva normal, No discharge.  Neck:  Normal range of motion, No cervical tenderness, Supple, No stridor, no JVD  .  No thyromegaly.  No carotid bruit.  Cardiovascular:  Normal heart rate, IRR rhythm,  S1, S2, no S3,  S4; No gallops; No murmurs, No rubs, .   Extremitites with intact distal pulses, no cyanosis, clubbing or edema.  No heaves, thrills, HJR;  Peripheral pulses: carotid 2+, brachial 2+, radial 2+, ulnar 2+, " femoral 2+, popliteal 2+, PT 2+, DP 2+;  Lungs:  Respiratory effort is normal. Normal breath sounds, breath sounds clear to auscultation bilaterally,  no rales, no rhonchi, no wheezing.   Abdomen: Bowel sounds normal, Soft, No tenderness, No guarding, No rebound, No masses, No hepatosplenomegaly.  Skin: Warm, Dry, No erythema, No rash, no induration or crepitus.  Neurologic: Alert & oriented x 3, Normal motor function, Normal sensory function, No focal deficits noted, cranial nerves II through XII are normal,    Psychiatric: Affect normal, Judgment normal, Mood normal.      MDM (Data Review):     Records reviewed and summarized in current documentation    Lab Data Review:  Recent Results (from the past 24 hour(s))   MAGNESIUM    Collection Time: 03/18/17  4:16 PM   Result Value Ref Range    Magnesium 1.7 1.5 - 2.5 mg/dL   BASIC METABOLIC PANEL    Collection Time: 03/18/17  4:16 PM   Result Value Ref Range    Sodium 137 135 - 145 mmol/L    Potassium 4.3 3.6 - 5.5 mmol/L    Chloride 106 96 - 112 mmol/L    Co2 24 20 - 33 mmol/L    Glucose 231 (H) 65 - 99 mg/dL    Bun 23 (H) 8 - 22 mg/dL    Creatinine 0.92 0.50 - 1.40 mg/dL    Calcium 8.5 8.5 - 10.5 mg/dL    Anion Gap 7.0 0.0 - 11.9   ESTIMATED GFR    Collection Time: 03/18/17  4:16 PM   Result Value Ref Range    GFR If African American >60 >60 mL/min/1.73 m 2    GFR If Non African American 58 (A) >60 mL/min/1.73 m 2   CBC WITH DIFFERENTIAL    Collection Time: 03/19/17  3:43 AM   Result Value Ref Range    WBC 7.2 4.8 - 10.8 K/uL    RBC 4.65 4.20 - 5.40 M/uL    Hemoglobin 13.0 12.0 - 16.0 g/dL    Hematocrit 40.7 37.0 - 47.0 %    MCV 87.5 81.4 - 97.8 fL    MCH 28.0 27.0 - 33.0 pg    MCHC 31.9 (L) 33.6 - 35.0 g/dL    RDW 47.7 35.9 - 50.0 fL    Platelet Count 133 (L) 164 - 446 K/uL    MPV 11.9 9.0 - 12.9 fL    Neutrophils-Polys 72.90 (H) 44.00 - 72.00 %    Lymphocytes 24.50 22.00 - 41.00 %    Monocytes 1.90 0.00 - 13.40 %    Eosinophils 0.00 0.00 - 6.90 %    Basophils 0.10  0.00 - 1.80 %    Immature Granulocytes 0.60 0.00 - 0.90 %    Nucleated RBC 0.00 /100 WBC    Neutrophils (Absolute) 5.25 2.00 - 7.15 K/uL    Lymphs (Absolute) 1.77 1.00 - 4.80 K/uL    Monos (Absolute) 0.14 0.00 - 0.85 K/uL    Eos (Absolute) 0.00 0.00 - 0.51 K/uL    Baso (Absolute) 0.01 0.00 - 0.12 K/uL    Immature Granulocytes (abs) 0.04 0.00 - 0.11 K/uL    NRBC (Absolute) 0.00 K/uL   BASIC METABOLIC PANEL    Collection Time: 03/19/17  3:43 AM   Result Value Ref Range    Sodium 140 135 - 145 mmol/L    Potassium 4.1 3.6 - 5.5 mmol/L    Chloride 108 96 - 112 mmol/L    Co2 22 20 - 33 mmol/L    Glucose 159 (H) 65 - 99 mg/dL    Bun 27 (H) 8 - 22 mg/dL    Creatinine 0.85 0.50 - 1.40 mg/dL    Calcium 8.8 8.5 - 10.5 mg/dL    Anion Gap 10.0 0.0 - 11.9   MAGNESIUM    Collection Time: 03/19/17  3:43 AM   Result Value Ref Range    Magnesium 1.9 1.5 - 2.5 mg/dL   ESTIMATED GFR    Collection Time: 03/19/17  3:43 AM   Result Value Ref Range    GFR If African American >60 >60 mL/min/1.73 m 2    GFR If Non African American >60 >60 mL/min/1.73 m 2       Imaging/Procedures Review:    Chest Xray:  Reviewed    EKG:   As in HPI. Reviewed; AFib    MDM (Assessment and Plan):     Active Hospital Problems    Diagnosis   • CAP (community acquired pneumonia) [J18.9]     Priority: High   • Atrial fibrillation with RVR (CMS-HCC) [I48.91]   • Sick sinus syndrome (CMS-HCC) [I49.5]   • Sinus pause [I45.5]   • Acute respiratory failure with hypoxia (CMS-HCC) [J96.01]   • Hemorrhagic disorder due to extrinsic circulating anticoagulants (CMS-HCC) [D68.32]   • Acute cystitis [N30.00]   • Late onset Alzheimer's disease without behavioral disturbance [G30.1, F02.80]   • Atrial fibrillation, chronic (CMS-HCC) [I48.2]   • Dyslipidemia [E78.5]   • HTN (hypertension), benign [I10]       She is asx up to 2.4 sec pauses with AFib as underlying rhythm  Rec: Monitor w/o txmt if asx  Balance lytes, K+>4.0 and Mg>2.0; check TFT  Echo pdg, will review  Will  follow  Thx

## 2017-03-20 ENCOUNTER — TELEPHONE (OUTPATIENT)
Dept: MEDICAL GROUP | Facility: MEDICAL CENTER | Age: 82
End: 2017-03-20

## 2017-03-20 LAB
ANION GAP SERPL CALC-SCNC: 7 MMOL/L (ref 0–11.9)
BUN SERPL-MCNC: 35 MG/DL (ref 8–22)
CALCIUM SERPL-MCNC: 9 MG/DL (ref 8.5–10.5)
CHLORIDE SERPL-SCNC: 107 MMOL/L (ref 96–112)
CO2 SERPL-SCNC: 24 MMOL/L (ref 20–33)
CREAT SERPL-MCNC: 0.97 MG/DL (ref 0.5–1.4)
ERYTHROCYTE [DISTWIDTH] IN BLOOD BY AUTOMATED COUNT: 47.5 FL (ref 35.9–50)
GFR SERPL CREATININE-BSD FRML MDRD: 54 ML/MIN/1.73 M 2
GLUCOSE SERPL-MCNC: 159 MG/DL (ref 65–99)
HCT VFR BLD AUTO: 39.2 % (ref 37–47)
HGB BLD-MCNC: 12.7 G/DL (ref 12–16)
MCH RBC QN AUTO: 28.1 PG (ref 27–33)
MCHC RBC AUTO-ENTMCNC: 32.4 G/DL (ref 33.6–35)
MCV RBC AUTO: 86.7 FL (ref 81.4–97.8)
PLATELET # BLD AUTO: 142 K/UL (ref 164–446)
PMV BLD AUTO: 11.5 FL (ref 9–12.9)
POTASSIUM SERPL-SCNC: 4.3 MMOL/L (ref 3.6–5.5)
RBC # BLD AUTO: 4.52 M/UL (ref 4.2–5.4)
SODIUM SERPL-SCNC: 138 MMOL/L (ref 135–145)
WBC # BLD AUTO: 11.3 K/UL (ref 4.8–10.8)

## 2017-03-20 PROCEDURE — 700102 HCHG RX REV CODE 250 W/ 637 OVERRIDE(OP): Performed by: PHARMACIST

## 2017-03-20 PROCEDURE — G8988 SELF CARE GOAL STATUS: HCPCS | Mod: CI

## 2017-03-20 PROCEDURE — 700105 HCHG RX REV CODE 258: Performed by: HOSPITALIST

## 2017-03-20 PROCEDURE — A9270 NON-COVERED ITEM OR SERVICE: HCPCS | Performed by: HOSPITALIST

## 2017-03-20 PROCEDURE — 700111 HCHG RX REV CODE 636 W/ 250 OVERRIDE (IP): Performed by: INTERNAL MEDICINE

## 2017-03-20 PROCEDURE — G8978 MOBILITY CURRENT STATUS: HCPCS | Mod: CJ

## 2017-03-20 PROCEDURE — G8979 MOBILITY GOAL STATUS: HCPCS | Mod: CI

## 2017-03-20 PROCEDURE — G8987 SELF CARE CURRENT STATUS: HCPCS | Mod: CK

## 2017-03-20 PROCEDURE — 700105 HCHG RX REV CODE 258

## 2017-03-20 PROCEDURE — 97165 OT EVAL LOW COMPLEX 30 MIN: CPT

## 2017-03-20 PROCEDURE — A9270 NON-COVERED ITEM OR SERVICE: HCPCS | Performed by: PHARMACIST

## 2017-03-20 PROCEDURE — 80048 BASIC METABOLIC PNL TOTAL CA: CPT

## 2017-03-20 PROCEDURE — 36415 COLL VENOUS BLD VENIPUNCTURE: CPT

## 2017-03-20 PROCEDURE — 97162 PT EVAL MOD COMPLEX 30 MIN: CPT

## 2017-03-20 PROCEDURE — 700111 HCHG RX REV CODE 636 W/ 250 OVERRIDE (IP): Performed by: HOSPITALIST

## 2017-03-20 PROCEDURE — 770020 HCHG ROOM/CARE - TELE (206)

## 2017-03-20 PROCEDURE — 700102 HCHG RX REV CODE 250 W/ 637 OVERRIDE(OP): Performed by: HOSPITALIST

## 2017-03-20 PROCEDURE — 99232 SBSQ HOSP IP/OBS MODERATE 35: CPT | Performed by: INTERNAL MEDICINE

## 2017-03-20 PROCEDURE — 85027 COMPLETE CBC AUTOMATED: CPT

## 2017-03-20 RX ORDER — SODIUM CHLORIDE 9 MG/ML
INJECTION, SOLUTION INTRAVENOUS
Status: COMPLETED
Start: 2017-03-20 | End: 2017-03-20

## 2017-03-20 RX ADMIN — MEMANTINE HYDROCHLORIDE 10 MG: 10 TABLET ORAL at 09:00

## 2017-03-20 RX ADMIN — DABIGATRAN ETEXILATE MESYLATE 75 MG: 75 CAPSULE ORAL at 08:59

## 2017-03-20 RX ADMIN — STANDARDIZED SENNA CONCENTRATE AND DOCUSATE SODIUM 2 TABLET: 8.6; 5 TABLET, FILM COATED ORAL at 19:47

## 2017-03-20 RX ADMIN — ROSUVASTATIN CALCIUM 125 MCG: 10 TABLET, FILM COATED ORAL at 17:32

## 2017-03-20 RX ADMIN — METHYLPREDNISOLONE SODIUM SUCCINATE 62.5 MG: 125 INJECTION, POWDER, FOR SOLUTION INTRAMUSCULAR; INTRAVENOUS at 05:55

## 2017-03-20 RX ADMIN — MEMANTINE HYDROCHLORIDE 10 MG: 10 TABLET ORAL at 19:47

## 2017-03-20 RX ADMIN — METHYLPREDNISOLONE SODIUM SUCCINATE 62.5 MG: 125 INJECTION, POWDER, FOR SOLUTION INTRAMUSCULAR; INTRAVENOUS at 13:32

## 2017-03-20 RX ADMIN — DABIGATRAN ETEXILATE MESYLATE 75 MG: 75 CAPSULE ORAL at 19:47

## 2017-03-20 RX ADMIN — OXYBUTYNIN CHLORIDE 10 MG: 5 TABLET, FILM COATED, EXTENDED RELEASE ORAL at 09:00

## 2017-03-20 RX ADMIN — STANDARDIZED SENNA CONCENTRATE AND DOCUSATE SODIUM 2 TABLET: 8.6; 5 TABLET, FILM COATED ORAL at 09:00

## 2017-03-20 RX ADMIN — CEFTRIAXONE 2 G: 2 INJECTION, POWDER, FOR SOLUTION INTRAMUSCULAR; INTRAVENOUS at 08:58

## 2017-03-20 RX ADMIN — AZITHROMYCIN 250 MG: 250 TABLET, FILM COATED ORAL at 08:59

## 2017-03-20 RX ADMIN — OMEPRAZOLE 20 MG: 20 CAPSULE, DELAYED RELEASE ORAL at 09:00

## 2017-03-20 RX ADMIN — SODIUM CHLORIDE 500 ML: 9 INJECTION, SOLUTION INTRAVENOUS at 08:56

## 2017-03-20 ASSESSMENT — PAIN SCALES - GENERAL
PAINLEVEL_OUTOF10: 0

## 2017-03-20 ASSESSMENT — GAIT ASSESSMENTS
DISTANCE (FEET): 150
ASSISTIVE DEVICE: FRONT WHEEL WALKER
GAIT LEVEL OF ASSIST: CONTACT GUARD ASSIST
DEVIATION: OTHER (COMMENT)

## 2017-03-20 NOTE — PROGRESS NOTES
Bedside report received. Patient A&O to self only. Currently on 3 L via [place] Expiratory wheezes throughout. No complaints of pain. No overnight events. Patient is incontinent to urine. Call light and belongings with in reach. Bed locked and in lowest position, alarm and fall precautions in place.

## 2017-03-20 NOTE — PROGRESS NOTES
Pt resting comfortably at this time and is eating breakfast in bed. Bed is locked and in the lowest position, call light and personal belongings are within reach.

## 2017-03-20 NOTE — PROGRESS NOTES
Cardiology Progress Note               Author: Pippa Colmenares Date & Time created: 3/20/2017  10:10 AM     Interval History:  88 F with moderate dementia admitted for CAP and found to afib RVR.     Consult afib.    3/20:  afib , with 1 sec pauses throughout the night       Echocardiography Laboratory  Prior echo 2014 Normal left ventricular chamber size.  Mild concentric left ventricular hypertrophy.  Left ventricular ejection fraction is visually estimated to be 55%.  The right ventricle was normal in size and function.  Severely dilated left atrium.  Aortic sclerosis without stenosis.  Mild aortic insufficiency.  Mitral annular calcification.  Mild mitral regurgitation.  Mild to moderate tricuspid regurgitation.  Normal pericardium without effusion.    Review of Systems   Unable to perform ROS  Constitutional:        Dementia        Physical Exam   Constitutional: She appears well-developed and well-nourished.   HENT:   Head: Normocephalic.   Neck: Normal range of motion. No JVD present.   Cardiovascular: Normal rate and normal heart sounds.  An irregularly irregular rhythm present.   No murmur heard.  Pulmonary/Chest: Effort normal and breath sounds normal. No respiratory distress. She has no wheezes.   Abdominal: Bowel sounds are normal.   Musculoskeletal: Normal range of motion. She exhibits no edema or tenderness.   Neurological: She is alert. She is disoriented.   Skin: Skin is warm and dry.   Psychiatric: Her behavior is normal. Judgment normal.   Nursing note and vitals reviewed.      Hemodynamics:  Temp (24hrs), Av.8 °C (98.2 °F), Min:36.5 °C (97.7 °F), Max:37.4 °C (99.3 °F)  Temperature: 36.5 °C (97.7 °F)  Pulse  Av.3  Min: 54  Max: 122   Blood Pressure : 154/72 mmHg     Respiratory:    Respiration: 18, Pulse Oximetry: 94 %, O2 Daily Delivery Respiratory : Silicone Nasal Cannula     Given By:: Mask, Work Of Breathing / Effort: Mild  RUL Breath Sounds: Clear, RML Breath Sounds:  Expiratory Wheezes, RLL Breath Sounds: Expiratory Wheezes, SAVAGE Breath Sounds: Clear, LLL Breath Sounds: Crackles;Expiratory Wheezes  Fluids:     Weight: 67.1 kg (147 lb 14.9 oz)  GI/Nutrition:  Orders Placed This Encounter   Procedures   • Diet Order     Standing Status: Standing      Number of Occurrences: 1      Standing Expiration Date:      Order Specific Question:  Diet:     Answer:  Regular [1]     Lab Results:  Recent Labs      03/18/17   0446  03/19/17   0343  03/20/17   0246   WBC  5.4  7.2  11.3*   RBC  4.12*  4.65  4.52   HEMOGLOBIN  11.6*  13.0  12.7   HEMATOCRIT  36.6*  40.7  39.2   MCV  88.8  87.5  86.7   MCH  28.2  28.0  28.1   MCHC  31.7*  31.9*  32.4*   RDW  49.2  47.7  47.5   PLATELETCT  113*  133*  142*   MPV  11.7  11.9  11.5     Recent Labs      03/18/17   1616  03/19/17   0343  03/20/17   0246   SODIUM  137  140  138   POTASSIUM  4.3  4.1  4.3   CHLORIDE  106  108  107   CO2  24  22  24   GLUCOSE  231*  159*  159*   BUN  23*  27*  35*   CREATININE  0.92  0.85  0.97   CALCIUM  8.5  8.8  9.0         Recent Labs      03/17/17   2306   BNPBTYPENAT  234*     Recent Labs      03/17/17   1756  03/17/17   2306   TROPONINI  0.03   --    BNPBTYPENAT   --   234*             Medical Decision Making, by Problem:  Active Hospital Problems    Diagnosis   • *CAP (community acquired pneumonia) [J18.9]   • Atrial fibrillation with RVR (CMS-[facility]) [I48.91]   • Sick sinus syndrome (CMS-[facility]) [I49.5]   • Sinus pause [I45.5]   • Acute respiratory failure with hypoxia (CMS-[facility]) [J96.01]   • Hemorrhagic disorder due to extrinsic circulating anticoagulants (CMS-[facility]) [D68.32]   • Acute cystitis [N30.00]   • Late onset Alzheimer's disease without behavioral disturbance [G30.1, F02.80]   • Atrial fibrillation, chronic (CMS-HCC) [I48.2]   • Dyslipidemia [E78.5]   • HTN (hypertension), benign [I10]       Plan:  1. Afib:  She is asx up to 2.4 sec pauses with AFib as underlying rhythm  Monitor summary throughout the night: only  1.2 sec pause  Rate controlled with digoxin   OAC: Pradaxa ( [id number])  Balance lytes, K+>4.0 and Mg>2.0  Echo: ef 55%, moderate tricuspid regurgitation     Will arrange outpatient follow up.     Core Measures

## 2017-03-20 NOTE — CARE PLAN
Problem: Safety  Goal: Will remain free from injury  Bed locked and in lowest position. Bed alarm on. Treaded socks. Call light and belongings within reach. Patient educated to call for assistance. Pt verbalized understanding. Hourly rounding in place.     Problem: Skin Integrity  Goal: Risk for impaired skin integrity will decrease  Skin assessment completed, Q 2 turns in place. Pillows in use for positioning and to float heels, silicone oxygen tubing and ear pads in use. Frequent linen changes to ensure patient stays dry. Hourly rounding in place.

## 2017-03-20 NOTE — THERAPY
"Physical Therapy Evaluation completed.   Bed Mobility:  Supine to Sit: Minimal Assist  Transfers: Sit to Stand: Minimal Assist  Gait: Level Of Assist: Contact Guard Assist with Front-Wheel Walker       Plan of Care: Will benefit from Physical Therapy 2 times per week  Discharge Recommendations: Equipment: Will Continue to Assess for Equipment Needs. See below    Pt presents to PT for risk reduction for LOB and falling as well as impaired endurance/aerobic capacity associated with recent pulmonary co-morbidities. Pt was able to demonstrate short, hallway distance ambulation with FWW with no dorothy LOB. However, she requires consistent VC/TC for envirnomental awareness, safety, and wayfinding. Her baseline cognitive deficits are exacerbating her risk for falls and current functional impairements. Pt will benefit from continued skilled acute PT for d/c planning, though anticipate that functionally, pt may be close to recent baseline. Unclear if pt would require continued skilled PT prior to or after medical d/c to home and will be highly dependent on home enviornment, assist at home and function at time of medical clearance. Will continue to follow. She would currently require 24/7 assist given her cognitive function and concerns with re: safety.     See \"Rehab Therapy-Acute\" Patient Summary Report for complete documentation.     "

## 2017-03-20 NOTE — TELEPHONE ENCOUNTER
1. Caller Name: Arlen Cruz  (Daughter, did not leave name)                                       Call Back Number: [phone] (home)       Patient approves a detailed voicemail message: yes    Pt daughter called wanted you to know mom was found unresponsive, pt is being taken to [facility]

## 2017-03-20 NOTE — PROGRESS NOTES
Hospital Medicine Progress Note, Adult, Complex               Author: Jerod Garcia Date & Time created: 3/20/2017  2:13 PM     ID/CC: 87 y/o f admitted with CAP     Interval History:  No overnight events , afebrile, no nausea or vomiting, denies CP SOB improving   Cardiology following appreciate rec appreciate rec.     Review of Systems:  Review of Systems   Unable to perform ROS: dementia     Physical Exam:  Physical Exam   Constitutional: She appears well-developed and well-nourished. No distress.   HENT:   Head: Normocephalic and atraumatic.   Mouth/Throat: No oropharyngeal exudate.   Eyes: Pupils are equal, round, and reactive to light. Right eye exhibits no discharge. Left eye exhibits no discharge.   Neck: Normal range of motion. Neck supple.   Cardiovascular: Normal heart sounds and intact distal pulses.    No murmur heard.  Irregular irregular   Pulmonary/Chest: Effort normal. No stridor. No respiratory distress. She has wheezes.   Rhonchi b/l     Abdominal: Soft. Bowel sounds are normal. She exhibits no distension. There is no tenderness. There is no rebound.   Musculoskeletal: Normal range of motion. She exhibits no edema or tenderness.   Neurological: She is alert. No cranial nerve deficit.   X2, unclear what her baseline is   Skin: Skin is warm and dry. No rash noted.   Psychiatric: She has a normal mood and affect.   Nursing note and vitals reviewed.      Labs:  Recent Labs      03/18/17   0913   [id number]  7.45   [id number]  32.3   [id number]  97.6*   [id number]  97.3   ARTHCO3  22   ARTBE  -1     Recent Labs      03/17/17   1756  03/17/17   2306   TROPONINI  0.03   --    BNPBTYPENAT   --   234*     Recent Labs      03/18/17   1616  03/19/17   0343  03/20/17   0246   SODIUM  137  140  138   POTASSIUM  4.3  4.1  4.3   CHLORIDE  106  108  107   CO2  24  22  24   BUN  23*  27*  35*   CREATININE  0.92  0.85  0.97   MAGNESIUM  1.7  1.9   --    CALCIUM  8.5  8.8  9.0     Recent Labs      03/17/17   1730    17   1616  17   0343  17   0246   ALTSGPT  21   --    --    --    --    ASTSGOT  29   --    --    --    --    ALKPHOSPHAT  63   --    --    --    --    TBILIRUBIN  0.4   --    --    --    --    GLUCOSE  114*   < >  231*  159*  159*    < > = values in this interval not displayed.     Recent Labs      17   0446  17   0246   RBC  4.12*  4.65  4.52   HEMOGLOBIN  11.6*  13.0  12.7   HEMATOCRIT  36.6*  40.7  39.2   PLATELETCT  113*  133*  142*     Recent Labs      17   1730  17   04417   0246   WBC  7.6  5.4  7.2  11.3*   NEUTSPOLYS  52.90   --   72.90*   --    LYMPHOCYTES  39.40   --   24.50   --    MONOCYTES  6.70   --   1.90   --    EOSINOPHILS  0.40   --   0.00   --    BASOPHILS  0.30   --   0.10   --    ASTSGOT  29   --    --    --    ALTSGPT  21   --    --    --    ALKPHOSPHAT  63   --    --    --    TBILIRUBIN  0.4   --    --    --            Hemodynamics:  Temp (24hrs), Av.7 °C (98.1 °F), Min:36.5 °C (97.7 °F), Max:37.4 °C (99.3 °F)  Temperature: 36.5 °C (97.7 °F)  Pulse  Av.3  Min: 54  Max: 122   Blood Pressure : 152/86 mmHg     Respiratory:    Respiration: 18, Pulse Oximetry: 97 %, O2 Daily Delivery Respiratory : Silicone Nasal Cannula     Given By:: Mask, Work Of Breathing / Effort: Mild  RUL Breath Sounds: Clear, RML Breath Sounds: Clear, RLL Breath Sounds: Clear, SAVAGE Breath Sounds: Clear, LLL Breath Sounds: Clear  Fluids:  No intake or output data in the 24 hours ending 17 1413  Weight: 67.1 kg (147 lb 14.9 oz)  GI/Nutrition:  Orders Placed This Encounter   Procedures   • Diet Order     Standing Status: Standing      Number of Occurrences: 1      Standing Expiration Date:      Order Specific Question:  Diet:     Answer:  Regular [1]     Medical Decision Making, by Problem:  Active Hospital Problems    Diagnosis   • *CAP (community acquired pneumonia) [J18.9]-continue with IV CTX and AZT  -cont xopenex and  atrovent RT inhalers  -ceftriaxone and azithro  -monitor closely  -Rapid flu negative   • Atrial fibrillation with RVR (CMS-HCC) [I48.91]-rapidly flucuating HR  -query underlying SSS and with sinus pauses now, certainly exacerbated by concurrent PNA  -cardiology following  appreciate rec.    • Sick sinus syndrome (CMS-HCC) [I49.5  ]-??  -cardiology following  appreciate rec.    • Sinus pause [I45.5]  -cardiology following    • Acute respiratory failure with hypoxia (CMS-HCC) [J96.01]  -as above    • Hemorrhagic disorder due to extrinsic circulating anticoagulants (CMS-HCC) [D68.32]  -continue pradaxa  -watch Cr levels closely   • Acute cystitis [N30.00]  -IV CTX, F/U final urine culture, adjust abx's PRN   • Late onset Alzheimer's disease without behavioral disturbance [G30.1, F02.80]-unclear what her baseline is   • Atrial fibrillation, chronic (CMS-HCC) [I48.2]  -as above   • Dyslipidemia [E78.5  ]-cont statin   • HTN (hypertension), benign [I10  -well controlled       Labs reviewed and Medications reviewed  Gutiérrez catheter: No Gutiérrez      DVT: pradaxa.        Assessed for rehab: Patient returned to prior level of function, rehabilitation not indicated at this time

## 2017-03-21 ENCOUNTER — APPOINTMENT (OUTPATIENT)
Dept: RADIOLOGY | Facility: MEDICAL CENTER | Age: 82
DRG: 177 | End: 2017-03-21
Attending: NURSE PRACTITIONER
Payer: MEDICARE

## 2017-03-21 PROBLEM — F03.90 DEMENTIA (HCC): Status: ACTIVE | Noted: 2017-03-21

## 2017-03-21 PROBLEM — J18.9 PNEUMONIA: Status: ACTIVE | Noted: 2017-03-21

## 2017-03-21 PROBLEM — N18.30 CKD (CHRONIC KIDNEY DISEASE), STAGE III (HCC): Status: ACTIVE | Noted: 2017-03-21

## 2017-03-21 PROBLEM — R82.81 PYURIA: Status: ACTIVE | Noted: 2017-03-21

## 2017-03-21 PROBLEM — R13.10 DYSPHAGIA: Status: ACTIVE | Noted: 2017-03-21

## 2017-03-21 LAB
ANION GAP SERPL CALC-SCNC: 8 MMOL/L (ref 0–11.9)
BUN SERPL-MCNC: 43 MG/DL (ref 8–22)
CALCIUM SERPL-MCNC: 8.7 MG/DL (ref 8.5–10.5)
CHLORIDE SERPL-SCNC: 109 MMOL/L (ref 96–112)
CO2 SERPL-SCNC: 24 MMOL/L (ref 20–33)
CREAT SERPL-MCNC: 0.95 MG/DL (ref 0.5–1.4)
ERYTHROCYTE [DISTWIDTH] IN BLOOD BY AUTOMATED COUNT: 47.9 FL (ref 35.9–50)
GFR SERPL CREATININE-BSD FRML MDRD: 56 ML/MIN/1.73 M 2
GLUCOSE SERPL-MCNC: 126 MG/DL (ref 65–99)
HCT VFR BLD AUTO: 38.2 % (ref 37–47)
HGB BLD-MCNC: 12.1 G/DL (ref 12–16)
MCH RBC QN AUTO: 27.9 PG (ref 27–33)
MCHC RBC AUTO-ENTMCNC: 31.7 G/DL (ref 33.6–35)
MCV RBC AUTO: 88 FL (ref 81.4–97.8)
PLATELET # BLD AUTO: 157 K/UL (ref 164–446)
PMV BLD AUTO: 11.4 FL (ref 9–12.9)
POTASSIUM SERPL-SCNC: 4.3 MMOL/L (ref 3.6–5.5)
RBC # BLD AUTO: 4.34 M/UL (ref 4.2–5.4)
SODIUM SERPL-SCNC: 141 MMOL/L (ref 135–145)
WBC # BLD AUTO: 12.2 K/UL (ref 4.8–10.8)

## 2017-03-21 PROCEDURE — 92610 EVALUATE SWALLOWING FUNCTION: CPT

## 2017-03-21 PROCEDURE — G8996 SWALLOW CURRENT STATUS: HCPCS | Mod: CK

## 2017-03-21 PROCEDURE — A9270 NON-COVERED ITEM OR SERVICE: HCPCS | Performed by: PHARMACIST

## 2017-03-21 PROCEDURE — 85027 COMPLETE CBC AUTOMATED: CPT

## 2017-03-21 PROCEDURE — 770020 HCHG ROOM/CARE - TELE (206)

## 2017-03-21 PROCEDURE — 80048 BASIC METABOLIC PNL TOTAL CA: CPT

## 2017-03-21 PROCEDURE — 700105 HCHG RX REV CODE 258: Performed by: HOSPITALIST

## 2017-03-21 PROCEDURE — 71010 DX-CHEST-PORTABLE (1 VIEW): CPT

## 2017-03-21 PROCEDURE — 700111 HCHG RX REV CODE 636 W/ 250 OVERRIDE (IP): Performed by: INTERNAL MEDICINE

## 2017-03-21 PROCEDURE — A9270 NON-COVERED ITEM OR SERVICE: HCPCS | Performed by: HOSPITALIST

## 2017-03-21 PROCEDURE — 700102 HCHG RX REV CODE 250 W/ 637 OVERRIDE(OP): Performed by: HOSPITALIST

## 2017-03-21 PROCEDURE — 700101 HCHG RX REV CODE 250: Performed by: HOSPITALIST

## 2017-03-21 PROCEDURE — 700101 HCHG RX REV CODE 250: Performed by: FAMILY MEDICINE

## 2017-03-21 PROCEDURE — G8997 SWALLOW GOAL STATUS: HCPCS | Mod: CK

## 2017-03-21 PROCEDURE — 94640 AIRWAY INHALATION TREATMENT: CPT

## 2017-03-21 PROCEDURE — 700111 HCHG RX REV CODE 636 W/ 250 OVERRIDE (IP): Performed by: HOSPITALIST

## 2017-03-21 PROCEDURE — 99233 SBSQ HOSP IP/OBS HIGH 50: CPT | Performed by: FAMILY MEDICINE

## 2017-03-21 PROCEDURE — 36415 COLL VENOUS BLD VENIPUNCTURE: CPT

## 2017-03-21 PROCEDURE — 700102 HCHG RX REV CODE 250 W/ 637 OVERRIDE(OP): Performed by: PHARMACIST

## 2017-03-21 RX ADMIN — OXYBUTYNIN CHLORIDE 10 MG: 5 TABLET, FILM COATED, EXTENDED RELEASE ORAL at 09:41

## 2017-03-21 RX ADMIN — CEFTRIAXONE 2 G: 2 INJECTION, POWDER, FOR SOLUTION INTRAMUSCULAR; INTRAVENOUS at 09:33

## 2017-03-21 RX ADMIN — ROSUVASTATIN CALCIUM 125 MCG: 10 TABLET, FILM COATED ORAL at 17:23

## 2017-03-21 RX ADMIN — OMEPRAZOLE 20 MG: 20 CAPSULE, DELAYED RELEASE ORAL at 09:41

## 2017-03-21 RX ADMIN — METRONIDAZOLE 500 MG: 500 INJECTION, SOLUTION INTRAVENOUS at 17:15

## 2017-03-21 RX ADMIN — AZITHROMYCIN 250 MG: 250 TABLET, FILM COATED ORAL at 09:38

## 2017-03-21 RX ADMIN — STANDARDIZED SENNA CONCENTRATE AND DOCUSATE SODIUM 2 TABLET: 8.6; 5 TABLET, FILM COATED ORAL at 09:42

## 2017-03-21 RX ADMIN — DABIGATRAN ETEXILATE MESYLATE 75 MG: 75 CAPSULE ORAL at 09:39

## 2017-03-21 RX ADMIN — MEMANTINE HYDROCHLORIDE 10 MG: 10 TABLET ORAL at 09:40

## 2017-03-21 RX ADMIN — METHYLPREDNISOLONE SODIUM SUCCINATE 62.5 MG: 125 INJECTION, POWDER, FOR SOLUTION INTRAMUSCULAR; INTRAVENOUS at 02:43

## 2017-03-21 RX ADMIN — METHYLPREDNISOLONE SODIUM SUCCINATE 62.5 MG: 125 INJECTION, POWDER, FOR SOLUTION INTRAMUSCULAR; INTRAVENOUS at 14:42

## 2017-03-21 RX ADMIN — GUAIFENESIN AND DEXTROMETHORPHAN 10 ML: 100; 10 SYRUP ORAL at 02:43

## 2017-03-21 RX ADMIN — DABIGATRAN ETEXILATE MESYLATE 75 MG: 75 CAPSULE ORAL at 20:54

## 2017-03-21 RX ADMIN — MEMANTINE HYDROCHLORIDE 10 MG: 10 TABLET ORAL at 20:47

## 2017-03-21 RX ADMIN — STANDARDIZED SENNA CONCENTRATE AND DOCUSATE SODIUM 2 TABLET: 8.6; 5 TABLET, FILM COATED ORAL at 20:47

## 2017-03-21 RX ADMIN — METHYLPREDNISOLONE SODIUM SUCCINATE 62.5 MG: 125 INJECTION, POWDER, FOR SOLUTION INTRAMUSCULAR; INTRAVENOUS at 20:47

## 2017-03-21 RX ADMIN — IPRATROPIUM BROMIDE AND ALBUTEROL SULFATE 3 ML: .5; 3 SOLUTION RESPIRATORY (INHALATION) at 04:37

## 2017-03-21 ASSESSMENT — ENCOUNTER SYMPTOMS
SORE THROAT: 0
HEARTBURN: 0
WHEEZING: 1
FEVER: 0
HEADACHES: 0
WEAKNESS: 1
ABDOMINAL PAIN: 0
BLURRED VISION: 0
VOMITING: 0
COUGH: 1
NAUSEA: 1
DIZZINESS: 0
DIARRHEA: 0
CHILLS: 0
SHORTNESS OF BREATH: 1

## 2017-03-21 ASSESSMENT — PAIN SCALES - GENERAL
PAINLEVEL_OUTOF10: 0

## 2017-03-21 NOTE — THERAPY
"Occupational Therapy Evaluation completed.   Functional Status:  Min/mod A with ADLs and txfs  Plan of Care: Will benefit from Occupational Therapy 3 times per week  Discharge Recommendations:  Equipment: Will Continue to Assess for Equipment Needs. Post-acute therapy Discharge to a transitional care facility for continued skilled therapy services.    See \"Rehab Therapy-Acute\" Patient Summary Report for complete documentation.    "

## 2017-03-21 NOTE — CARE PLAN
Problem: Communication  Goal: The ability to communicate needs accurately and effectively will improve  Outcome: PROGRESSING AS EXPECTED  Patient with advanced dementia, will round frequently, ask about pain, monitor sensory perception, encourage to communicate needs/concerns to nurse/staff.

## 2017-03-21 NOTE — PROGRESS NOTES
Hospital Medicine Progress Note, Adult, Complex               Author: Eloy SHARIF Светланаwamicky Date & Time created: 3/21/2017  4:29 PM     Interval History:  Admitted for Pneumonia, Respiratory failure.    PNA - possible aspiration as she admits to swallowing difficulties  Resp failure - remains on O2  Afib - rate controlled currently  Sinus pause - 2 sec pauses  HTN - controlled  Dysphagia - needs swallow eval    Review of Systems:  Review of Systems   Constitutional: Positive for malaise/fatigue. Negative for fever and chills.   HENT: Negative for sore throat.    Eyes: Negative for blurred vision.   Respiratory: Positive for cough, shortness of breath and wheezing.    Cardiovascular: Negative for chest pain.   Gastrointestinal: Positive for nausea. Negative for heartburn, vomiting, abdominal pain and diarrhea.   Genitourinary: Negative for dysuria.   Neurological: Positive for weakness. Negative for dizziness and headaches.       Physical Exam:  Physical Exam   Constitutional: She appears well-developed and well-nourished.   HENT:   Head: Normocephalic and atraumatic.   Eyes: Conjunctivae are normal. Pupils are equal, round, and reactive to light.   Neck: No tracheal deviation present. No thyromegaly present.   Cardiovascular: An irregularly irregular rhythm present.   Pulmonary/Chest: Effort normal. She has wheezes. She has rales.   Abdominal: Soft. Bowel sounds are normal. She exhibits no distension. There is no tenderness.   Musculoskeletal: She exhibits edema.   Lymphadenopathy:     She has no cervical adenopathy.   Neurological: She is alert.   Oriented to person and place   Skin: Skin is warm and dry.   Nursing note and vitals reviewed.      Labs:        Invalid input(s): [id number]      Recent Labs      03/19/17   0343  03/20/17   0246  03/21/17   0208   SODIUM  140  138  141   POTASSIUM  4.1  4.3  4.3   CHLORIDE  108  107  109   CO2  22  24  24   BUN  27*  35*  43*   CREATININE  0.85  0.97  0.95   MAGNESIUM   1.9   --    --    CALCIUM  8.8  9.0  8.7     Recent Labs      173  17   0246  17   0208   GLUCOSE  159*  159*  126*     Recent Labs      173  17   0246  17   0208   RBC  4.65  4.52  4.34   HEMOGLOBIN  13.0  12.7  12.1   HEMATOCRIT  40.7  39.2  38.2   PLATELETCT  133*  142*  157*     Recent Labs      17   0246  17   0208   WBC  7.2  11.3*  12.2*   NEUTSPOLYS  72.90*   --    --    LYMPHOCYTES  24.50   --    --    MONOCYTES  1.90   --    --    EOSINOPHILS  0.00   --    --    BASOPHILS  0.10   --    --            Hemodynamics:  Temp (24hrs), Av.9 °C (98.4 °F), Min:36.3 °C (97.3 °F), Max:37.3 °C (99.1 °F)  Temperature: 36.3 °C (97.3 °F)  Pulse  Av  Min: 54  Max: 122   Blood Pressure : 110/69 mmHg     Respiratory:    Respiration: 19, Pulse Oximetry: 98 %, O2 Daily Delivery Respiratory : Silicone Nasal Cannula     Given By:: Mask, Work Of Breathing / Effort: Mild  RUL Breath Sounds: Expiratory Wheezes, RML Breath Sounds: Diminished;Expiratory Wheezes, RLL Breath Sounds: Diminished, SAVAGE Breath Sounds: Expiratory Wheezes, LLL Breath Sounds: Diminished  Fluids:    Intake/Output Summary (Last 24 hours) at 17 1629  Last data filed at 17 1800   Gross per 24 hour   Intake     25 ml   Output      0 ml   Net     25 ml     Weight: 67 kg (147 lb 11.3 oz)  GI/Nutrition:  Orders Placed This Encounter   Procedures   • DIET ORDER     Standing Status: Standing      Number of Occurrences: 1      Standing Expiration Date:      Order Specific Question:  Diet:     Answer:  Full Liquid [11]      Comments:  please deliver tray to nursing unit     Order Specific Question:  Consistency/Fluid modifications:     Answer:  Nectar Thick [2]     Medical Decision Making, by Problem:  Active Hospital Problems    Diagnosis   • *Pneumonia [J18.9]    change to IV Rocephin and Flagyl   • Acute respiratory failure with hypoxia (CMS-HCC) [J96.01]   O2 2 lpm NC   •  Dementia [F03.90]        Namenda, frequent orientation, avoid bzd, check b12. tsh   • Dysphagia [R13.10]      change to NTL   • CKD (chronic kidney disease), stage III [N18.3]       Follow bmp   • Atrial fibrillation with RVR (CMS-HCC) [I48.91]     Digoxin, check level, continue Pradaxa   • Sick sinus syndrome (CMS-HCC) [I49.5]        ffup with cardiology as outpt   • Atrial fibrillation, chronic (CMS-HCC) [I48.2]       As above   • HTN (hypertension), benign [I10]         • Pyuria [N39.0]      Culture negative   • Dyslipidemia [E78.5]              Wheezing.   IV Solumedrol, RT protocol    DVT Prophylaxis.  Pradaxa  Medications reviewed, Labs reviewed, Radiology images reviewed and EKG reviewed  Gutiérrez catheter: No Gutiérrez          Ulcer prophylaxis: Yes  Antibiotics: Treating active infection/contamination beyond 24 hours perioperative coverage

## 2017-03-21 NOTE — CARE PLAN
Problem: Safety  Goal: Will remain free from injury  Outcome: PROGRESSING AS EXPECTED  Patient with advanced dementia, will ensure bed alarm on at all times, door open, bed at lowest position, room free of clutter, monitor skin integrity. No new pain or injuries to report,

## 2017-03-21 NOTE — PROGRESS NOTES
Bedside report received. Patient A&O to self only. Pleasantly confused. Currently on 3.5 L via [place]. Baseline is 3 L. Expiratory wheezes throughout. No complaints of pain at this time. Overnight patient developed increased SOB and cough, CXR obtained. WBC's trending up currently 12.2 Patient is incontinent, Q 2 turns & frequent linen changes in place. Call light and belongings with in reach. Bed locked and in lowest position, alarm and fall precautions in place.

## 2017-03-21 NOTE — THERAPY
"  Speech Language Therapy Clinical Swallow Evaluation completed.  Functional Status: Pt sleeping before and immediately following the swallow assessment. She is able to maintain wakefulness when being fed. Oral motor assessment WNL. Pt with loose upper and lower dentures. Pt given 1/2-1 tsp amounts of applesauce and NTL. Pt with moderate delay in oral prep and oral stage of the swallow with holding oral bolus from 5-15 seconds before triggering a swallow. Improved timing of NTL with this texture given from a spoon. Pt did not cough during NTL full. Soft solids not assessed related to pt's current delay. Pt currently demonstrating s/s of moderate dysphagia with NTL full with assist for feeding recommended. SLP will attempt thicker puree items of dysphagia 1 at next session to assess if pt can tolerate Dysphagia 1 items without further delay in oral prep and oral stage of swallow. Nurs and CNA educ regarding texture change and recommendations. Swallow strategies posted HOB.  Recommendations - Diet: Diet / Liquid Recommendation: Nectar Thick Liquid                          Strategies: assist for feeding as needed, slow rate, use spoon for NTL fluids                          Medication Administration: Medication Administration : Crush all Medications in Puree  Plan of Care: Will benefit from Speech Therapy 3 times per week  Post-Acute Therapy: Discharge to a transitional care facility for continued skilled therapy services versus  as appropriate. Thanks, Yan    See \"Rehab Therapy-Acute\" Patient Summary Report for complete documentation.   "

## 2017-03-21 NOTE — CARE PLAN
Problem: Safety  Goal: Will remain free from injury  Bed locked and in lowest position. Bed alarm on. Treaded socks. Call light and belongings within reach. Patient educated to call for assistance. Pt verbalized understanding. Hourly rounding in place.     Problem: Skin Integrity  Goal: Risk for impaired skin integrity will decrease  Skin assessment completed, Q 2 turns in place. Pillows in use for positioning and to float heels, silicone oxygen tubing in use. Frequent linen changes to ensure patient stays dry. Hourly rounding in place.

## 2017-03-22 LAB
25(OH)D3 SERPL-MCNC: 18 NG/ML (ref 30–100)
ANION GAP SERPL CALC-SCNC: 8 MMOL/L (ref 0–11.9)
BACTERIA BLD CULT: NORMAL
BACTERIA BLD CULT: NORMAL
BASOPHILS # BLD AUTO: 0.3 % (ref 0–1.8)
BASOPHILS # BLD: 0.03 K/UL (ref 0–0.12)
BNP SERPL-MCNC: 327 PG/ML (ref 0–100)
BUN SERPL-MCNC: 37 MG/DL (ref 8–22)
CALCIUM SERPL-MCNC: 8.6 MG/DL (ref 8.5–10.5)
CHLORIDE SERPL-SCNC: 106 MMOL/L (ref 96–112)
CO2 SERPL-SCNC: 24 MMOL/L (ref 20–33)
CREAT SERPL-MCNC: 0.85 MG/DL (ref 0.5–1.4)
DIGOXIN SERPL-MCNC: 0.8 NG/ML (ref 0.8–2)
EOSINOPHIL # BLD AUTO: 0 K/UL (ref 0–0.51)
EOSINOPHIL NFR BLD: 0 % (ref 0–6.9)
ERYTHROCYTE [DISTWIDTH] IN BLOOD BY AUTOMATED COUNT: 48 FL (ref 35.9–50)
GFR SERPL CREATININE-BSD FRML MDRD: >60 ML/MIN/1.73 M 2
GLUCOSE SERPL-MCNC: 160 MG/DL (ref 65–99)
HCT VFR BLD AUTO: 40.8 % (ref 37–47)
HGB BLD-MCNC: 12.9 G/DL (ref 12–16)
IMM GRANULOCYTES # BLD AUTO: 0.13 K/UL (ref 0–0.11)
IMM GRANULOCYTES NFR BLD AUTO: 1.4 % (ref 0–0.9)
LYMPHOCYTES # BLD AUTO: 1.57 K/UL (ref 1–4.8)
LYMPHOCYTES NFR BLD: 17 % (ref 22–41)
MCH RBC QN AUTO: 27.8 PG (ref 27–33)
MCHC RBC AUTO-ENTMCNC: 31.6 G/DL (ref 33.6–35)
MCV RBC AUTO: 87.9 FL (ref 81.4–97.8)
MONOCYTES # BLD AUTO: 0.24 K/UL (ref 0–0.85)
MONOCYTES NFR BLD AUTO: 2.6 % (ref 0–13.4)
NEUTROPHILS # BLD AUTO: 7.29 K/UL (ref 2–7.15)
NEUTROPHILS NFR BLD: 78.7 % (ref 44–72)
NRBC # BLD AUTO: 0 K/UL
NRBC BLD AUTO-RTO: 0 /100 WBC
PLATELET # BLD AUTO: 163 K/UL (ref 164–446)
PMV BLD AUTO: 11.3 FL (ref 9–12.9)
POTASSIUM SERPL-SCNC: 4.5 MMOL/L (ref 3.6–5.5)
RBC # BLD AUTO: 4.64 M/UL (ref 4.2–5.4)
SIGNIFICANT IND 70042: NORMAL
SIGNIFICANT IND 70042: NORMAL
SITE SITE: NORMAL
SITE SITE: NORMAL
SODIUM SERPL-SCNC: 138 MMOL/L (ref 135–145)
SOURCE SOURCE: NORMAL
SOURCE SOURCE: NORMAL
TSH SERPL DL<=0.005 MIU/L-ACNC: 0.26 UIU/ML (ref 0.3–3.7)
VIT B12 SERPL-MCNC: 1046 PG/ML (ref 211–911)
WBC # BLD AUTO: 9.3 K/UL (ref 4.8–10.8)

## 2017-03-22 PROCEDURE — 82306 VITAMIN D 25 HYDROXY: CPT

## 2017-03-22 PROCEDURE — 84443 ASSAY THYROID STIM HORMONE: CPT

## 2017-03-22 PROCEDURE — 700105 HCHG RX REV CODE 258: Performed by: HOSPITALIST

## 2017-03-22 PROCEDURE — 80048 BASIC METABOLIC PNL TOTAL CA: CPT

## 2017-03-22 PROCEDURE — 83880 ASSAY OF NATRIURETIC PEPTIDE: CPT

## 2017-03-22 PROCEDURE — 770020 HCHG ROOM/CARE - TELE (206)

## 2017-03-22 PROCEDURE — A9270 NON-COVERED ITEM OR SERVICE: HCPCS | Performed by: PHARMACIST

## 2017-03-22 PROCEDURE — A9270 NON-COVERED ITEM OR SERVICE: HCPCS | Performed by: HOSPITALIST

## 2017-03-22 PROCEDURE — 99233 SBSQ HOSP IP/OBS HIGH 50: CPT | Performed by: FAMILY MEDICINE

## 2017-03-22 PROCEDURE — 92526 ORAL FUNCTION THERAPY: CPT

## 2017-03-22 PROCEDURE — 700111 HCHG RX REV CODE 636 W/ 250 OVERRIDE (IP): Performed by: FAMILY MEDICINE

## 2017-03-22 PROCEDURE — 82607 VITAMIN B-12: CPT

## 2017-03-22 PROCEDURE — 80162 ASSAY OF DIGOXIN TOTAL: CPT

## 2017-03-22 PROCEDURE — 36415 COLL VENOUS BLD VENIPUNCTURE: CPT

## 2017-03-22 PROCEDURE — 700101 HCHG RX REV CODE 250: Performed by: FAMILY MEDICINE

## 2017-03-22 PROCEDURE — 85025 COMPLETE CBC W/AUTO DIFF WBC: CPT

## 2017-03-22 PROCEDURE — 700102 HCHG RX REV CODE 250 W/ 637 OVERRIDE(OP): Performed by: PHARMACIST

## 2017-03-22 PROCEDURE — 700111 HCHG RX REV CODE 636 W/ 250 OVERRIDE (IP): Performed by: INTERNAL MEDICINE

## 2017-03-22 PROCEDURE — 700111 HCHG RX REV CODE 636 W/ 250 OVERRIDE (IP): Performed by: HOSPITALIST

## 2017-03-22 PROCEDURE — 700102 HCHG RX REV CODE 250 W/ 637 OVERRIDE(OP): Performed by: HOSPITALIST

## 2017-03-22 RX ORDER — METHYLPREDNISOLONE SODIUM SUCCINATE 40 MG/ML
40 INJECTION, POWDER, LYOPHILIZED, FOR SOLUTION INTRAMUSCULAR; INTRAVENOUS EVERY 8 HOURS
Status: DISCONTINUED | OUTPATIENT
Start: 2017-03-22 | End: 2017-03-23

## 2017-03-22 RX ADMIN — METHYLPREDNISOLONE SODIUM SUCCINATE 62.5 MG: 125 INJECTION, POWDER, FOR SOLUTION INTRAMUSCULAR; INTRAVENOUS at 06:42

## 2017-03-22 RX ADMIN — METRONIDAZOLE 500 MG: 500 INJECTION, SOLUTION INTRAVENOUS at 10:10

## 2017-03-22 RX ADMIN — OXYBUTYNIN CHLORIDE 10 MG: 5 TABLET, FILM COATED, EXTENDED RELEASE ORAL at 09:16

## 2017-03-22 RX ADMIN — METHYLPREDNISOLONE SODIUM SUCCINATE 40 MG: 40 INJECTION, POWDER, FOR SOLUTION INTRAMUSCULAR; INTRAVENOUS at 21:34

## 2017-03-22 RX ADMIN — METRONIDAZOLE 500 MG: 500 INJECTION, SOLUTION INTRAVENOUS at 01:00

## 2017-03-22 RX ADMIN — MEMANTINE HYDROCHLORIDE 10 MG: 10 TABLET ORAL at 09:17

## 2017-03-22 RX ADMIN — METRONIDAZOLE 500 MG: 500 INJECTION, SOLUTION INTRAVENOUS at 18:27

## 2017-03-22 RX ADMIN — CEFTRIAXONE 2 G: 2 INJECTION, POWDER, FOR SOLUTION INTRAMUSCULAR; INTRAVENOUS at 09:18

## 2017-03-22 RX ADMIN — DABIGATRAN ETEXILATE MESYLATE 75 MG: 75 CAPSULE ORAL at 21:30

## 2017-03-22 RX ADMIN — OMEPRAZOLE 20 MG: 20 CAPSULE, DELAYED RELEASE ORAL at 09:16

## 2017-03-22 RX ADMIN — MEMANTINE HYDROCHLORIDE 10 MG: 10 TABLET ORAL at 21:31

## 2017-03-22 RX ADMIN — ROSUVASTATIN CALCIUM 125 MCG: 10 TABLET, FILM COATED ORAL at 18:27

## 2017-03-22 RX ADMIN — STANDARDIZED SENNA CONCENTRATE AND DOCUSATE SODIUM 2 TABLET: 8.6; 5 TABLET, FILM COATED ORAL at 21:31

## 2017-03-22 RX ADMIN — DABIGATRAN ETEXILATE MESYLATE 75 MG: 75 CAPSULE ORAL at 09:20

## 2017-03-22 ASSESSMENT — ENCOUNTER SYMPTOMS
COUGH: 1
FEVER: 0
VOMITING: 0
HEARTBURN: 0
DIARRHEA: 0
WHEEZING: 1
SORE THROAT: 0
BLURRED VISION: 0
DIZZINESS: 0
NAUSEA: 1
HEADACHES: 0
ABDOMINAL PAIN: 0
WEAKNESS: 1
SHORTNESS OF BREATH: 1
CHILLS: 0

## 2017-03-22 ASSESSMENT — PAIN SCALES - GENERAL: PAINLEVEL_OUTOF10: 0

## 2017-03-22 ASSESSMENT — LIFESTYLE VARIABLES
REASON UNABLE TO ASSESS: CONFUSED
DO YOU DRINK ALCOHOL: NO

## 2017-03-22 NOTE — PROGRESS NOTES
Hospital Medicine Progress Note, Adult, Complex               Author: Eloy Sotomayorwamicky Date & Time created: 3/22/2017  2:39 PM     Interval History:  Admitted for Pneumonia, Respiratory failure.    PNA - likely secondary to aspiration, looks much better  Resp failure - remains on O2  Afib - rate controlled currently  Sinus pause - had 2 sec pauses  HTN - controlled  Dysphagia - swallow eval done  Low TSH    Review of Systems:  Review of Systems   Constitutional: Positive for malaise/fatigue. Negative for fever and chills.   HENT: Negative for sore throat.    Eyes: Negative for blurred vision.   Respiratory: Positive for cough, shortness of breath and wheezing.    Cardiovascular: Negative for chest pain.   Gastrointestinal: Positive for nausea. Negative for heartburn, vomiting, abdominal pain and diarrhea.   Genitourinary: Negative for dysuria.   Neurological: Positive for weakness. Negative for dizziness and headaches.       Physical Exam:  Physical Exam   Constitutional: She appears well-developed and well-nourished.   HENT:   Head: Normocephalic and atraumatic.   Eyes: Conjunctivae are normal. Pupils are equal, round, and reactive to light.   Neck: No tracheal deviation present. No thyromegaly present.   Cardiovascular: An irregularly irregular rhythm present.   Pulmonary/Chest: Effort normal. She has wheezes. She has rales.   Abdominal: Soft. Bowel sounds are normal. She exhibits no distension. There is no tenderness.   Musculoskeletal: She exhibits edema.   Lymphadenopathy:     She has no cervical adenopathy.   Neurological: She is alert.   Oriented to person and place   Skin: Skin is warm and dry.   Nursing note and vitals reviewed.      Labs:        Invalid input(s): [id number]  Recent Labs      03/22/17   0238   BNPBTYPENAT  327*     Recent Labs      03/20/17   0246  03/21/17   0208  03/22/17   0238   SODIUM  138  141  138   POTASSIUM  4.3  4.3  4.5   CHLORIDE  107  109  106   CO2  24  24  24   BUN  35*   43*  37*   CREATININE  0.97  0.95  0.85   CALCIUM  9.0  8.7  8.6     Recent Labs      176  17   0208  17   0238   GLUCOSE  159*  126*  160*     Recent Labs      176  17   0208  17   0238   RBC  4.52  4.34  4.64   HEMOGLOBIN  12.7  12.1  12.9   HEMATOCRIT  39.2  38.2  40.8   PLATELETCT  142*  157*  163*     Recent Labs      17   0238   WBC  11.3*  12.2*  9.3   NEUTSPOLYS   --    --   78.70*   LYMPHOCYTES   --    --   17.00*   MONOCYTES   --    --   2.60   EOSINOPHILS   --    --   0.00   BASOPHILS   --    --   0.30           Hemodynamics:  Temp (24hrs), Av.8 °C (98.3 °F), Min:36.6 °C (97.8 °F), Max:37.2 °C (98.9 °F)  Temperature: 37.2 °C (98.9 °F)  Pulse  Av.1  Min: 54  Max: 122   Blood Pressure : 120/65 mmHg     Respiratory:    Respiration: 18, Pulse Oximetry: 98 %     Work Of Breathing / Effort: Mild     Fluids:    Intake/Output Summary (Last 24 hours) at 17 1439  Last data filed at 17   Gross per 24 hour   Intake     50 ml   Output      0 ml   Net     50 ml     Weight: 74.5 kg (164 lb 3.9 oz)  GI/Nutrition:  Orders Placed This Encounter   Procedures   • DIET ORDER     Standing Status: Standing      Number of Occurrences: 1      Standing Expiration Date:      Order Specific Question:  Diet:     Answer:  Full Liquid [11]      Comments:  please deliver tray to nursing unit     Order Specific Question:  Consistency/Fluid modifications:     Answer:  Nectar Thick [2]     Medical Decision Making, by Problem:  Active Hospital Problems    Diagnosis   • *Pneumonia [J18.9]       IV Rocephin and Flagyl   • Acute respiratory failure with hypoxia (CMS-HCC) [J96.01]     O2 2 lpm NC   • Dementia [F03.90]        Namenda, frequent orientation, avoid bzd   • Dysphagia [R13.10]         NTL, speech to follow   • CKD (chronic kidney disease), stage III [N18.3]       follow bmp   • Atrial fibrillation with RVR (CMS-HCC)  [I48.91]     Digoxin, Pradaxa   • Sick sinus syndrome (CMS-HCC) [I49.5]        ffup with cardiology as outpt   • Atrial fibrillation, chronic (CMS-HCC) [I48.2]       as above   • HTN (hypertension), benign [I10]         • Pyuria [N39.0]            culture negative   • Dyslipidemia [E78.5]              Wheezing.       decrease IV Solumedrol, continue RT protocol       Low TSH.   check FT4 and FT3    DVT Prophylaxis.  Pradaxa  Medications reviewed, Labs reviewed, Radiology images reviewed and EKG reviewed  Gutiérrez catheter: No Gutiérrez          Ulcer prophylaxis: Yes  Antibiotics: Treating active infection/contamination beyond 24 hours perioperative coverage

## 2017-03-22 NOTE — CARE PLAN
Problem: Respiratory:  Goal: Respiratory status will improve  Outcome: PROGRESSING AS EXPECTED  Patient at baseline home O2 level

## 2017-03-22 NOTE — PROGRESS NOTES
Received Pt. Alert, oriented to self only. Denies pain and SOB. O2 sat 97% on 4L NC. Patient turned Q 2h for comfort and per protocol. Call light and personal items in reach. Bed alarm on.     MS  Afib 80-90s

## 2017-03-22 NOTE — THERAPY
"Speech Language Therapy dysphagia treatment completed.   Functional Status:  Pt sitting up in chair with CNA feeding pt lunch. Limited intake related to be declining the food. Dysphagia 1 tray brought from kitchen with greater intake of the thicker puree versus the NTL . Pt with holding oral bolus approx 10-15 seconds for puree and no oral phase delay with sips of NTL from spoon. Pt declining further intake following approx 10 tsps of puree during feeding. Pt stating \"I have a bad taste for food right now.\" Pt unable to answer questions regarding her appetite or the type of \"taste\" she is experiencing. Pt did accept nearly one cup of the thickened liquid using a spoon with no delay in swallow and no coughing. Pt sleepy near end of session. SLP collaborated with nurs regarding upgrade to dysphagia 1 NTL. Collaborated with CNA and she is to transfer pt back to bed soon.  Recommendations: Dysphagia 1 NTL  Plan of Care: Will benefit from Speech Therapy 3 times per week  Post-Acute Therapy: Discharge to a transitional care facility for continued skilled therapy services.Thanks, Yan    See \"Rehab Therapy-Acute\" Patient Summary Report for complete documentation.     "

## 2017-03-23 LAB
ANION GAP SERPL CALC-SCNC: 6 MMOL/L (ref 0–11.9)
BASOPHILS # BLD AUTO: 0.2 % (ref 0–1.8)
BASOPHILS # BLD: 0.02 K/UL (ref 0–0.12)
BUN SERPL-MCNC: 42 MG/DL (ref 8–22)
CALCIUM SERPL-MCNC: 8.5 MG/DL (ref 8.5–10.5)
CHLORIDE SERPL-SCNC: 107 MMOL/L (ref 96–112)
CO2 SERPL-SCNC: 26 MMOL/L (ref 20–33)
CREAT SERPL-MCNC: 0.89 MG/DL (ref 0.5–1.4)
EOSINOPHIL # BLD AUTO: 0 K/UL (ref 0–0.51)
EOSINOPHIL NFR BLD: 0 % (ref 0–6.9)
ERYTHROCYTE [DISTWIDTH] IN BLOOD BY AUTOMATED COUNT: 47.1 FL (ref 35.9–50)
GFR SERPL CREATININE-BSD FRML MDRD: 60 ML/MIN/1.73 M 2
GLUCOSE SERPL-MCNC: 170 MG/DL (ref 65–99)
HCT VFR BLD AUTO: 42.2 % (ref 37–47)
HGB BLD-MCNC: 13.5 G/DL (ref 12–16)
IMM GRANULOCYTES # BLD AUTO: 0.13 K/UL (ref 0–0.11)
IMM GRANULOCYTES NFR BLD AUTO: 1.2 % (ref 0–0.9)
LYMPHOCYTES # BLD AUTO: 2.19 K/UL (ref 1–4.8)
LYMPHOCYTES NFR BLD: 19.4 % (ref 22–41)
MCH RBC QN AUTO: 28 PG (ref 27–33)
MCHC RBC AUTO-ENTMCNC: 32 G/DL (ref 33.6–35)
MCV RBC AUTO: 87.6 FL (ref 81.4–97.8)
MONOCYTES # BLD AUTO: 0.47 K/UL (ref 0–0.85)
MONOCYTES NFR BLD AUTO: 4.2 % (ref 0–13.4)
NEUTROPHILS # BLD AUTO: 8.46 K/UL (ref 2–7.15)
NEUTROPHILS NFR BLD: 75 % (ref 44–72)
NRBC # BLD AUTO: 0 K/UL
NRBC BLD AUTO-RTO: 0 /100 WBC
PLATELET # BLD AUTO: 178 K/UL (ref 164–446)
PMV BLD AUTO: 10.7 FL (ref 9–12.9)
POTASSIUM SERPL-SCNC: 4.5 MMOL/L (ref 3.6–5.5)
RBC # BLD AUTO: 4.82 M/UL (ref 4.2–5.4)
SODIUM SERPL-SCNC: 139 MMOL/L (ref 135–145)
T3FREE SERPL-MCNC: 1.72 PG/ML (ref 2.4–4.2)
T4 FREE SERPL-MCNC: 0.98 NG/DL (ref 0.53–1.43)
WBC # BLD AUTO: 11.3 K/UL (ref 4.8–10.8)

## 2017-03-23 PROCEDURE — A9270 NON-COVERED ITEM OR SERVICE: HCPCS | Performed by: FAMILY MEDICINE

## 2017-03-23 PROCEDURE — 99232 SBSQ HOSP IP/OBS MODERATE 35: CPT | Performed by: FAMILY MEDICINE

## 2017-03-23 PROCEDURE — 700105 HCHG RX REV CODE 258: Performed by: HOSPITALIST

## 2017-03-23 PROCEDURE — 80048 BASIC METABOLIC PNL TOTAL CA: CPT

## 2017-03-23 PROCEDURE — 84481 FREE ASSAY (FT-3): CPT

## 2017-03-23 PROCEDURE — 770020 HCHG ROOM/CARE - TELE (206)

## 2017-03-23 PROCEDURE — A9270 NON-COVERED ITEM OR SERVICE: HCPCS | Performed by: HOSPITALIST

## 2017-03-23 PROCEDURE — 700101 HCHG RX REV CODE 250: Performed by: FAMILY MEDICINE

## 2017-03-23 PROCEDURE — 700111 HCHG RX REV CODE 636 W/ 250 OVERRIDE (IP): Performed by: HOSPITALIST

## 2017-03-23 PROCEDURE — 700102 HCHG RX REV CODE 250 W/ 637 OVERRIDE(OP): Performed by: PHARMACIST

## 2017-03-23 PROCEDURE — 85025 COMPLETE CBC W/AUTO DIFF WBC: CPT

## 2017-03-23 PROCEDURE — 700102 HCHG RX REV CODE 250 W/ 637 OVERRIDE(OP): Performed by: FAMILY MEDICINE

## 2017-03-23 PROCEDURE — 700102 HCHG RX REV CODE 250 W/ 637 OVERRIDE(OP): Performed by: HOSPITALIST

## 2017-03-23 PROCEDURE — 84439 ASSAY OF FREE THYROXINE: CPT

## 2017-03-23 PROCEDURE — 36415 COLL VENOUS BLD VENIPUNCTURE: CPT

## 2017-03-23 PROCEDURE — 700111 HCHG RX REV CODE 636 W/ 250 OVERRIDE (IP): Performed by: FAMILY MEDICINE

## 2017-03-23 PROCEDURE — A9270 NON-COVERED ITEM OR SERVICE: HCPCS | Performed by: PHARMACIST

## 2017-03-23 RX ORDER — PREDNISONE 20 MG/1
40 TABLET ORAL DAILY
Status: DISCONTINUED | OUTPATIENT
Start: 2017-03-24 | End: 2017-03-24 | Stop reason: HOSPADM

## 2017-03-23 RX ORDER — CEFDINIR 300 MG/1
300 CAPSULE ORAL EVERY 12 HOURS
Status: DISCONTINUED | OUTPATIENT
Start: 2017-03-24 | End: 2017-03-24 | Stop reason: HOSPADM

## 2017-03-23 RX ORDER — METRONIDAZOLE 500 MG/1
500 TABLET ORAL EVERY 8 HOURS
Status: DISCONTINUED | OUTPATIENT
Start: 2017-03-23 | End: 2017-03-24 | Stop reason: HOSPADM

## 2017-03-23 RX ADMIN — METHYLPREDNISOLONE SODIUM SUCCINATE 40 MG: 40 INJECTION, POWDER, FOR SOLUTION INTRAMUSCULAR; INTRAVENOUS at 05:45

## 2017-03-23 RX ADMIN — OXYBUTYNIN CHLORIDE 10 MG: 5 TABLET, FILM COATED, EXTENDED RELEASE ORAL at 08:22

## 2017-03-23 RX ADMIN — MEMANTINE HYDROCHLORIDE 10 MG: 10 TABLET ORAL at 21:15

## 2017-03-23 RX ADMIN — OMEPRAZOLE 20 MG: 20 CAPSULE, DELAYED RELEASE ORAL at 08:21

## 2017-03-23 RX ADMIN — DABIGATRAN ETEXILATE MESYLATE 75 MG: 75 CAPSULE ORAL at 08:23

## 2017-03-23 RX ADMIN — MEMANTINE HYDROCHLORIDE 10 MG: 10 TABLET ORAL at 08:21

## 2017-03-23 RX ADMIN — METRONIDAZOLE 500 MG: 500 TABLET ORAL at 21:16

## 2017-03-23 RX ADMIN — METRONIDAZOLE 500 MG: 500 INJECTION, SOLUTION INTRAVENOUS at 08:21

## 2017-03-23 RX ADMIN — METHYLPREDNISOLONE SODIUM SUCCINATE 40 MG: 40 INJECTION, POWDER, FOR SOLUTION INTRAMUSCULAR; INTRAVENOUS at 13:12

## 2017-03-23 RX ADMIN — ROSUVASTATIN CALCIUM 125 MCG: 10 TABLET, FILM COATED ORAL at 18:03

## 2017-03-23 RX ADMIN — METRONIDAZOLE 500 MG: 500 INJECTION, SOLUTION INTRAVENOUS at 02:20

## 2017-03-23 RX ADMIN — CEFTRIAXONE 2 G: 2 INJECTION, POWDER, FOR SOLUTION INTRAMUSCULAR; INTRAVENOUS at 08:18

## 2017-03-23 RX ADMIN — METRONIDAZOLE 500 MG: 500 TABLET ORAL at 13:12

## 2017-03-23 RX ADMIN — STANDARDIZED SENNA CONCENTRATE AND DOCUSATE SODIUM 2 TABLET: 8.6; 5 TABLET, FILM COATED ORAL at 08:21

## 2017-03-23 RX ADMIN — DABIGATRAN ETEXILATE MESYLATE 75 MG: 75 CAPSULE ORAL at 21:25

## 2017-03-23 ASSESSMENT — ENCOUNTER SYMPTOMS
HEARTBURN: 0
VOMITING: 0
NAUSEA: 0
DIZZINESS: 0
DIARRHEA: 0
SORE THROAT: 0
BLURRED VISION: 0
SHORTNESS OF BREATH: 0
CHILLS: 0
COUGH: 1
WHEEZING: 0
WEAKNESS: 1
FEVER: 0
HEADACHES: 0
ABDOMINAL PAIN: 0

## 2017-03-23 ASSESSMENT — PAIN SCALES - GENERAL
PAINLEVEL_OUTOF10: 0

## 2017-03-23 NOTE — CARE PLAN
Problem: Safety  Goal: Will remain free from falls  Outcome: PROGRESSING AS EXPECTED  Pt does not call before getting up. Pt wearing treaded slipper socks. Bed alarm on.    Problem: Venous Thromboembolism (VTW)/Deep Vein Thrombosis (DVT) Prevention:  Goal: Patient will participate in Venous Thrombosis (VTE)/Deep Vein Thrombosis (DVT)Prevention Measures  Outcome: PROGRESSING AS EXPECTED  Pt receiving Pradaxa for VTE and ambulates appropriately.

## 2017-03-23 NOTE — PROGRESS NOTES
Hospital Medicine Progress Note, Adult, Complex               Author: Eloy Sotomayorwamicky Date & Time created: 3/23/2017  1:47 PM     Interval History:  Admitted for Pneumonia, Respiratory failure.    PNA - likely secondary to aspiration, feels and looks much better  Resp failure - remains on O2  Afib - rate controlled currently  Sinus pause - had 2 sec pauses  HTN - controlled  Dysphagia - swallow reeval done    Review of Systems:  Review of Systems   Constitutional: Positive for malaise/fatigue. Negative for fever and chills.   HENT: Negative for sore throat.    Eyes: Negative for blurred vision.   Respiratory: Positive for cough. Negative for shortness of breath and wheezing.    Cardiovascular: Negative for chest pain.   Gastrointestinal: Negative for heartburn, nausea, vomiting, abdominal pain and diarrhea.   Genitourinary: Negative for dysuria.   Neurological: Positive for weakness. Negative for dizziness and headaches.       Physical Exam:  Physical Exam   Constitutional: She appears well-developed and well-nourished.   HENT:   Head: Normocephalic and atraumatic.   Eyes: Conjunctivae are normal. Pupils are equal, round, and reactive to light.   Neck: No tracheal deviation present. No thyromegaly present.   Cardiovascular: An irregularly irregular rhythm present.   Pulmonary/Chest: Effort normal. She has wheezes. She has rales.   Abdominal: Soft. Bowel sounds are normal. She exhibits no distension. There is no tenderness.   Musculoskeletal: She exhibits edema.   Lymphadenopathy:     She has no cervical adenopathy.   Neurological: She is alert.   Oriented to person and place   Skin: Skin is warm and dry.   Nursing note and vitals reviewed.      Labs:        Invalid input(s): [id number]  Recent Labs      03/22/17   0238   BNPBTYPENAT  327*     Recent Labs      03/21/17   0208  03/22/17   0238  03/23/17   0354   SODIUM  141  138  139   POTASSIUM  4.3  4.5  4.5   CHLORIDE  109  106  107   CO2  24  24  26   BUN   43*  37*  42*   CREATININE  0.95  0.85  0.89   CALCIUM  8.7  8.6  8.5     Recent Labs      17   0208  178  17   0354   GLUCOSE  126*  160*  170*     Recent Labs      178  178  17   0354   RBC  4.34  4.64  4.82   HEMOGLOBIN  12.1  12.9  13.5   HEMATOCRIT  38.2  40.8  42.2   PLATELETCT  157*  163*  178     Recent Labs      17   0354   WBC  12.2*  9.3  11.3*   NEUTSPOLYS   --   78.70*  75.00*   LYMPHOCYTES   --   17.00*  19.40*   MONOCYTES   --   2.60  4.20   EOSINOPHILS   --   0.00  0.00   BASOPHILS   --   0.30  0.20           Hemodynamics:  Temp (24hrs), Av.5 °C (97.7 °F), Min:36.2 °C (97.2 °F), Max:36.8 °C (98.2 °F)  Temperature: 36.2 °C (97.2 °F)  Pulse  Av.1  Min: 54  Max: 122   Blood Pressure : 140/70 mmHg     Respiratory:    Respiration: 18, Pulse Oximetry: 98 %     Work Of Breathing / Effort: Mild  RUL Breath Sounds: Diminished, RML Breath Sounds: Diminished;Expiratory Wheezes, RLL Breath Sounds: Diminished, SAVAGE Breath Sounds: Diminished, LLL Breath Sounds: Diminished  Fluids:  No intake or output data in the 24 hours ending 17 1347     GI/Nutrition:  Orders Placed This Encounter   Procedures   • DIET ORDER     Standing Status: Standing      Number of Occurrences: 1      Standing Expiration Date:      Order Specific Question:  Diet:     Answer:  Regular [1]     Order Specific Question:  Texture/Fiber modifications:     Answer:  Dysphagia 1(Pureed)specify fluid consistency(question 6) [1]      Comments:  please deliver trays to nursing unit     Order Specific Question:  Consistency/Fluid modifications:     Answer:  Nectar Thick [2]     Medical Decision Making, by Problem:  Active Hospital Problems    Diagnosis   • *Pneumonia [J18.9]        change to Omnicef and Flagyl   • Acute respiratory failure with hypoxia (CMS-HCC) [J96.01]     O2 2 lpm NC   • Dementia [F03.90]        Namenda, frequent orientation,  avoid bzd   • Dysphagia [R13.10]       change to Dysphagia 1, NTL, speech to follow   • CKD (chronic kidney disease), stage III [N18.3]       follow bmp   • Atrial fibrillation with RVR (CMS-HCC) [I48.91]     Digoxin, Pradaxa   • Sick sinus syndrome (CMS-HCC) [I49.5]        ffup with cardiology as outpt   • Atrial fibrillation, chronic (CMS-HCC) [I48.2]       as above   • HTN (hypertension), benign [I10]         • Pyuria [N39.0]            culture negative   • Dyslipidemia [E78.5]              Wheezing.       change to oral Prednisone, continue RT protocol       Low TSH.    FT4 wnl and FT3 low, will need ffup as outpt    DVT Prophylaxis.  Pradaxa  Medications reviewed, Labs reviewed, Radiology images reviewed and EKG reviewed  Gutiérrez catheter: No Gutiérrez          Ulcer prophylaxis: Yes  Antibiotics: Treating active infection/contamination beyond 24 hours perioperative coverage

## 2017-03-23 NOTE — PROGRESS NOTES
Pt in chair refused to go back to bed at this time. Exit alarm on.  Informed patient that her daughter called.  Pt recognized daughters name.  She appears comfortable & denies any discomfort. Call light within reach

## 2017-03-23 NOTE — PROGRESS NOTES
Received report and assumed care. Pt resting in bed orient to self. Bed alarm on. Appears comfortable. Denies discomfort.

## 2017-03-24 VITALS
SYSTOLIC BLOOD PRESSURE: 134 MMHG | HEIGHT: 63 IN | TEMPERATURE: 98.3 F | DIASTOLIC BLOOD PRESSURE: 84 MMHG | RESPIRATION RATE: 16 BRPM | BODY MASS INDEX: 29.1 KG/M2 | WEIGHT: 164.24 LBS | HEART RATE: 83 BPM | OXYGEN SATURATION: 93 %

## 2017-03-24 LAB
ANION GAP SERPL CALC-SCNC: 7 MMOL/L (ref 0–11.9)
BASOPHILS # BLD AUTO: 0.3 % (ref 0–1.8)
BASOPHILS # BLD: 0.04 K/UL (ref 0–0.12)
BUN SERPL-MCNC: 43 MG/DL (ref 8–22)
CALCIUM SERPL-MCNC: 8.5 MG/DL (ref 8.5–10.5)
CHLORIDE SERPL-SCNC: 106 MMOL/L (ref 96–112)
CO2 SERPL-SCNC: 24 MMOL/L (ref 20–33)
CREAT SERPL-MCNC: 0.87 MG/DL (ref 0.5–1.4)
EOSINOPHIL # BLD AUTO: 0.01 K/UL (ref 0–0.51)
EOSINOPHIL NFR BLD: 0.1 % (ref 0–6.9)
ERYTHROCYTE [DISTWIDTH] IN BLOOD BY AUTOMATED COUNT: 47.5 FL (ref 35.9–50)
GFR SERPL CREATININE-BSD FRML MDRD: >60 ML/MIN/1.73 M 2
GLUCOSE SERPL-MCNC: 138 MG/DL (ref 65–99)
HCT VFR BLD AUTO: 41.9 % (ref 37–47)
HGB BLD-MCNC: 13.2 G/DL (ref 12–16)
IMM GRANULOCYTES # BLD AUTO: 0.19 K/UL (ref 0–0.11)
IMM GRANULOCYTES NFR BLD AUTO: 1.4 % (ref 0–0.9)
LYMPHOCYTES # BLD AUTO: 3.26 K/UL (ref 1–4.8)
LYMPHOCYTES NFR BLD: 23.8 % (ref 22–41)
MCH RBC QN AUTO: 27.5 PG (ref 27–33)
MCHC RBC AUTO-ENTMCNC: 31.5 G/DL (ref 33.6–35)
MCV RBC AUTO: 87.3 FL (ref 81.4–97.8)
MONOCYTES # BLD AUTO: 0.88 K/UL (ref 0–0.85)
MONOCYTES NFR BLD AUTO: 6.4 % (ref 0–13.4)
NEUTROPHILS # BLD AUTO: 9.3 K/UL (ref 2–7.15)
NEUTROPHILS NFR BLD: 68 % (ref 44–72)
NRBC # BLD AUTO: 0 K/UL
NRBC BLD AUTO-RTO: 0 /100 WBC
PLATELET # BLD AUTO: 191 K/UL (ref 164–446)
PMV BLD AUTO: 10.6 FL (ref 9–12.9)
POTASSIUM SERPL-SCNC: 4.3 MMOL/L (ref 3.6–5.5)
RBC # BLD AUTO: 4.8 M/UL (ref 4.2–5.4)
SODIUM SERPL-SCNC: 137 MMOL/L (ref 135–145)
WBC # BLD AUTO: 13.7 K/UL (ref 4.8–10.8)

## 2017-03-24 PROCEDURE — A9270 NON-COVERED ITEM OR SERVICE: HCPCS | Performed by: PHARMACIST

## 2017-03-24 PROCEDURE — 700102 HCHG RX REV CODE 250 W/ 637 OVERRIDE(OP): Performed by: PHARMACIST

## 2017-03-24 PROCEDURE — 36415 COLL VENOUS BLD VENIPUNCTURE: CPT

## 2017-03-24 PROCEDURE — 700102 HCHG RX REV CODE 250 W/ 637 OVERRIDE(OP): Performed by: HOSPITALIST

## 2017-03-24 PROCEDURE — 85025 COMPLETE CBC W/AUTO DIFF WBC: CPT

## 2017-03-24 PROCEDURE — 80048 BASIC METABOLIC PNL TOTAL CA: CPT

## 2017-03-24 PROCEDURE — 700102 HCHG RX REV CODE 250 W/ 637 OVERRIDE(OP): Performed by: FAMILY MEDICINE

## 2017-03-24 PROCEDURE — 99239 HOSP IP/OBS DSCHRG MGMT >30: CPT | Performed by: FAMILY MEDICINE

## 2017-03-24 PROCEDURE — 700111 HCHG RX REV CODE 636 W/ 250 OVERRIDE (IP): Performed by: FAMILY MEDICINE

## 2017-03-24 PROCEDURE — A9270 NON-COVERED ITEM OR SERVICE: HCPCS | Performed by: FAMILY MEDICINE

## 2017-03-24 PROCEDURE — A9270 NON-COVERED ITEM OR SERVICE: HCPCS | Performed by: HOSPITALIST

## 2017-03-24 RX ORDER — PREDNISONE 10 MG/1
TABLET ORAL
Status: ON HOLD | DISCHARGE
Start: 2017-03-24 | End: 2017-05-05

## 2017-03-24 RX ORDER — CEFDINIR 300 MG/1
300 CAPSULE ORAL EVERY 12 HOURS
Refills: 0 | Status: SHIPPED | DISCHARGE
Start: 2017-03-24 | End: 2017-03-27

## 2017-03-24 RX ORDER — METRONIDAZOLE 500 MG/1
500 TABLET ORAL EVERY 8 HOURS
Qty: 30 TAB | Refills: 0 | Status: SHIPPED | DISCHARGE
Start: 2017-03-24 | End: 2017-03-27

## 2017-03-24 RX ADMIN — CEFDINIR 300 MG: 300 CAPSULE ORAL at 08:49

## 2017-03-24 RX ADMIN — OMEPRAZOLE 20 MG: 20 CAPSULE, DELAYED RELEASE ORAL at 08:49

## 2017-03-24 RX ADMIN — MEMANTINE HYDROCHLORIDE 10 MG: 10 TABLET ORAL at 08:49

## 2017-03-24 RX ADMIN — DABIGATRAN ETEXILATE MESYLATE 75 MG: 75 CAPSULE ORAL at 08:49

## 2017-03-24 RX ADMIN — METRONIDAZOLE 500 MG: 500 TABLET ORAL at 05:00

## 2017-03-24 RX ADMIN — PREDNISONE 40 MG: 20 TABLET ORAL at 08:49

## 2017-03-24 RX ADMIN — OXYBUTYNIN CHLORIDE 10 MG: 5 TABLET, FILM COATED, EXTENDED RELEASE ORAL at 08:49

## 2017-03-24 NOTE — PROGRESS NOTES
Pt dc'd to [facility]. IV and monitor removed; monitor room notified. Pt left unit via wheelchair with [facility] escort. Personal belongings with pt when leaving unit. Pt given discharge instructions and placed in chart. Copy of discharge instructions with pt and in the chart. Daughter notified of pt transfer

## 2017-03-24 NOTE — PROGRESS NOTES
Monitor room relayed pt touched down to HR 34, then right back up to 80's.  This co-occurred with a 1.8 sp, also 1x only.

## 2017-03-24 NOTE — CARE PLAN
Problem: Safety  Goal: Will remain free from falls  Outcome: PROGRESSING AS EXPECTED  Fall precautions in place. Bed in lowest position. Non-skid socks in place. Personal possessions within reach. Mobility sign on door. Bed-alarm on. Call light within reach. Pt educated regarding fall prevention and states understanding.     Problem: Skin Integrity  Goal: Risk for impaired skin integrity will decrease  Outcome: PROGRESSING AS EXPECTED  Pt encouraged to reposition Q 2 hrs. Incontinence care provided and barrier paste applied as needed.

## 2017-03-24 NOTE — DISCHARGE SUMMARY
DISCHARGE DIAGNOSES:  1.  Pneumonia, likely secondary to aspiration.  2.  Acute respiratory failure, hypoxia.  3.  Dementia.  4.  Dysphagia.  5.  Chronic kidney disease, stage III.  6.  Atrial fibrillation with rapid ventricular rate.  7.  Sick sinus syndrome.  8.  Chronic atrial fibrillation.  9.  Hypertension.  10.  Pyuria.  11.  Dyslipidemia.  12.  Wheezing, likely reactive airway disease.    CONSULTS:  Cardiology, Dr. Ana Luisa Lamar.    PROCEDURES:  Echocardiogram, which showed left ventricular ejection fraction   is 55% with mild concentric LVH.    HISTORY OF PRESENT ILLNESS:  For detailed H and P, please see Dr. Cormier's note   on 03/17/2017, brief summary, an 88-year-old white female who was brought in   secondary to cough for the past 4 days.  She was also getting short of breath   and she was noted to be hypoxic in room air in the emergency room.  X-ray   showed evidence of possible pneumonia.  Patient was admitted for further   evaluation and management.    HOSPITAL COURSE:  Upon admission, she was being treated for possible urinary   tract infection as well as pneumonia.  Urine cultures proved to be negative.    At this point, it appeared to be primarily pneumonia.  She also had some   respiratory hypoxia and continued to require O2 supplementation.  At some   point, it was noted that she was having difficulty swallowing.  Swallowing   evaluation was done.  She was noted to have dysphagia 1 nectar thick liquid   diet by speech.  Antibiotics were changed to reflect possible aspiration   pneumonia.  She has been on intravenous Rocephin and Flagyl and will be   transitioned to Omnicef and Flagyl on discharge.  She also has chronic atrial   fibrillation, she then go atrial fibrillation with rapid ventricular rate.    She was placed on digoxin and she also was placed on Pradaxa for   anticoagulation.  She also has some sinus pauses, cardiology was consulted and   she is not really a good candidate for pacemaker  placement at this point with   her advanced dementia, cardiology recommended follow up with them.  She also   had some wheezing when she came in.  She was placed on steroids with marked   improvement of the wheezing.  She could possibly have her reactive airway   disease.  She will need continued rehabilitation at skilled nursing facility   with physical therapy, occupational therapy and speech therapy.  The patient   was discharged improved and in stable condition.    DISCHARGE MEDICATIONS:  1.  Omnicef 300 mg every 12 hours 3 more days.  2.  Flagyl 500 every 8 hours for 2 more days.  3.  Prednisone 10 mg 4 tabs daily for 3 days, 3 tabs daily for 3 days, 2 tabs   daily for 3 days, 1 tab daily for 3 days.  4.  Ascorbic acid 500 mg per day.  5.  Digoxin 125 mcg per day.  6.  Namenda XR 1 capsule once daily.  7.  Omeprazole 20 mg daily.  8.  Pradaxa 75 mg twice a day.  9.  VESIcare 10 mg daily.    DIET:  Dysphagia 1 nectar thick liquid.    ACTIVITY:  As tolerated.    FOLLOWUP:  Follow up with Dr. Stuart Santacruz in 4 weeks.  Follow up with   cardiology, Dr. Ana Luisa Lamar in 4 weeks.    Total discharge time is 35 minutes.       ____________________________________     MD IRMA RIOS / DUDLEY    DD:  03/24/2017 08:01:02  DT:  03/24/2017 08:53:45    D#:  [id number]  Job#:  [id number]

## 2017-03-24 NOTE — CARE PLAN
Problem: Safety  Goal: Will remain free from injury  Outcome: PROGRESSING AS EXPECTED  Bed in lowest position, upper rails raised     Problem: Skin Integrity  Goal: Risk for impaired skin integrity will decrease  Outcome: PROGRESSING AS EXPECTED  Q2 hr turns, barrier cream applied, ROM exercises

## 2017-03-24 NOTE — PROGRESS NOTES
Bedside report received.   POC discussed with RN and daughter  Bed in lowest position, upper rails up   All needs met at this time.

## 2017-03-24 NOTE — DISCHARGE INSTRUCTIONS
Discharge Instructions    Discharged to group home by medical transportation with escort. Discharged via wheelchair, hospital escort: Yes.  Special equipment needed: Not Applicable    Be sure to schedule a follow-up appointment with your primary care doctor or any specialists as instructed.     Discharge Plan:        I understand that a diet low in cholesterol, fat, and sodium is recommended for good health. Unless I have been given specific instructions below for another diet, I accept this instruction as my diet prescription.   Other diet: Nectar thick     Special Instructions: None    · Is patient discharged on Warfarin / Coumadin?   No     · Is patient Post Blood Transfusion?  No    Depression / Suicide Risk    As you are discharged from this [facility] facility, it is important to learn how to keep safe from harming yourself.    Recognize the warning signs:  · Abrupt changes in personality, positive or negative- including increase in energy   · Giving away possessions  · Change in eating patterns- significant weight changes-  positive or negative  · Change in sleeping patterns- unable to sleep or sleeping all the time   · Unwillingness or inability to communicate  · Depression  · Unusual sadness, discouragement and loneliness  · Talk of wanting to die  · Neglect of personal appearance   · Rebelliousness- reckless behavior  · Withdrawal from people/activities they love  · Confusion- inability to concentrate     If you or a loved one observes any of these behaviors or has concerns about self-harm, here's what you can do:  · Talk about it- your feelings and reasons for harming yourself  · Remove any means that you might use to hurt yourself (examples: pills, rope, extension cords, firearm)  · Get professional help from the community (Mental Health, Substance Abuse, psychological counseling)  · Do not be alone:Call your Safe Contact- someone whom you trust who will be there for you.  · Call your local CRISIS  HOTLINE 341-6068 or 095-237-4067  · Call your local Children's Mobile Crisis Response Team [place] (249) 077-9212 or www.[employer]  · Call the toll free National Suicide Prevention Hotlines   · National Suicide Prevention Lifeline 735-205-WLCF (1562)  · National Hope Line Network 800-SUICIDE (192-6235)    Bronchitis  Bronchitis is a problem of the air tubes leading to your lungs. This problem makes it hard for air to get in and out of the lungs. You may cough a lot because your air tubes are narrow. Going without care can cause lasting (chronic) bronchitis.  HOME CARE   · Drink enough fluids to keep your pee (urine) clear or pale yellow.  · Use a cool mist humidifier.  · Quit smoking if you smoke. If you keep smoking, the bronchitis might not get better.  · Only take medicine as told by your doctor.  GET HELP RIGHT AWAY IF:   · Coughing keeps you awake.  · You start to wheeze.  · You become more sick or weak.  · You have a hard time breathing or get short of breath.  · You cough up blood.  · Coughing lasts more than 2 weeks.  · You have a fever.  · Your baby is older than 3 months with a rectal temperature of 102° F (38.9° C) or higher.  · Your baby is 3 months old or younger with a rectal temperature of 100.4° F (38° C) or higher.  MAKE SURE YOU:  · Understand these instructions.  · Will watch your condition.  · Will get help right away if you are not doing well or get worse.  Document Released: 06/05/2009 Document Revised: 03/11/2013 Document Reviewed: 11/19/2010  ExitCare® Patient Information ©2014 [employer].

## 2017-03-24 NOTE — PROGRESS NOTES
Assumed care at 1900. Bedside report received from [facility]. Patient's chart and MAR reviewed. Pt denies pain at this time. Pt is A & O x1, oriented to self.  Pt w/ hearing deficit is w/o hearing aids, hears louder volume voice. Patient was updated on plan of care for the day. Questions answered and concerns addressed.  Pt denies any additional needs at this time. White board updated. Call light, phone and personal belongings within reach.

## 2017-03-29 ENCOUNTER — APPOINTMENT (OUTPATIENT)
Dept: NEUROLOGY | Facility: MEDICAL CENTER | Age: 82
End: 2017-03-29
Payer: MEDICARE

## 2017-05-03 ENCOUNTER — APPOINTMENT (OUTPATIENT)
Dept: RADIOLOGY | Facility: MEDICAL CENTER | Age: 82
DRG: 536 | End: 2017-05-03
Attending: EMERGENCY MEDICINE
Payer: MEDICARE

## 2017-05-03 ENCOUNTER — HOSPITAL ENCOUNTER (INPATIENT)
Facility: MEDICAL CENTER | Age: 82
LOS: 2 days | DRG: 536 | End: 2017-05-08
Attending: EMERGENCY MEDICINE | Admitting: INTERNAL MEDICINE
Payer: MEDICARE

## 2017-05-03 ENCOUNTER — RESOLUTE PROFESSIONAL BILLING HOSPITAL PROF FEE (OUTPATIENT)
Dept: HOSPITALIST | Facility: MEDICAL CENTER | Age: 82
End: 2017-05-03
Payer: MEDICARE

## 2017-05-03 DIAGNOSIS — M25.552 PAIN OF LEFT HIP JOINT: ICD-10-CM

## 2017-05-03 PROBLEM — D64.9 NORMOCYTIC ANEMIA: Status: ACTIVE | Noted: 2017-05-03

## 2017-05-03 PROBLEM — J18.9 CAP (COMMUNITY ACQUIRED PNEUMONIA): Status: RESOLVED | Noted: 2017-03-18 | Resolved: 2017-05-03

## 2017-05-03 PROBLEM — J20.9 ACUTE BRONCHITIS: Status: RESOLVED | Noted: 2017-03-17 | Resolved: 2017-05-03

## 2017-05-03 PROBLEM — J96.01 ACUTE RESPIRATORY FAILURE WITH HYPOXIA (HCC): Status: RESOLVED | Noted: 2017-03-18 | Resolved: 2017-05-03

## 2017-05-03 PROBLEM — J18.9 PNEUMONIA: Status: RESOLVED | Noted: 2017-03-21 | Resolved: 2017-05-03

## 2017-05-03 LAB
ALBUMIN SERPL BCP-MCNC: 3.3 G/DL (ref 3.2–4.9)
ALBUMIN/GLOB SERPL: 1.1 G/DL
ALP SERPL-CCNC: 61 U/L (ref 30–99)
ALT SERPL-CCNC: 17 U/L (ref 2–50)
ANION GAP SERPL CALC-SCNC: 9 MMOL/L (ref 0–11.9)
APTT PPP: 31.9 SEC (ref 24.7–36)
AST SERPL-CCNC: 20 U/L (ref 12–45)
BASOPHILS # BLD AUTO: 0.9 % (ref 0–1.8)
BASOPHILS # BLD: 0.06 K/UL (ref 0–0.12)
BILIRUB SERPL-MCNC: 0.5 MG/DL (ref 0.1–1.5)
BUN SERPL-MCNC: 17 MG/DL (ref 8–22)
CALCIUM SERPL-MCNC: 9.1 MG/DL (ref 8.5–10.5)
CHLORIDE SERPL-SCNC: 105 MMOL/L (ref 96–112)
CO2 SERPL-SCNC: 26 MMOL/L (ref 20–33)
CREAT SERPL-MCNC: 0.89 MG/DL (ref 0.5–1.4)
EOSINOPHIL # BLD AUTO: 0.06 K/UL (ref 0–0.51)
EOSINOPHIL NFR BLD: 0.9 % (ref 0–6.9)
ERYTHROCYTE [DISTWIDTH] IN BLOOD BY AUTOMATED COUNT: 54.4 FL (ref 35.9–50)
GFR SERPL CREATININE-BSD FRML MDRD: 60 ML/MIN/1.73 M 2
GLOBULIN SER CALC-MCNC: 2.9 G/DL (ref 1.9–3.5)
GLUCOSE SERPL-MCNC: 105 MG/DL (ref 65–99)
HCT VFR BLD AUTO: 34.8 % (ref 37–47)
HGB BLD-MCNC: 11.1 G/DL (ref 12–16)
INR PPP: 1.08 (ref 0.87–1.13)
LIPASE SERPL-CCNC: 25 U/L (ref 11–82)
LYMPHOCYTES # BLD AUTO: 1.68 K/UL (ref 1–4.8)
LYMPHOCYTES NFR BLD: 23.7 % (ref 22–41)
MANUAL DIFF BLD: NORMAL
MCH RBC QN AUTO: 28.3 PG (ref 27–33)
MCHC RBC AUTO-ENTMCNC: 31.9 G/DL (ref 33.6–35)
MCV RBC AUTO: 88.8 FL (ref 81.4–97.8)
MONOCYTES # BLD AUTO: 0.19 K/UL (ref 0–0.85)
MONOCYTES NFR BLD AUTO: 2.7 % (ref 0–13.4)
MORPHOLOGY BLD-IMP: NORMAL
MYELOCYTES NFR BLD MANUAL: 0.9 %
NEUTROPHILS # BLD AUTO: 5.03 K/UL (ref 2–7.15)
NEUTROPHILS NFR BLD: 70.9 % (ref 44–72)
NRBC # BLD AUTO: 0 K/UL
NRBC BLD AUTO-RTO: 0 /100 WBC
PLATELET # BLD AUTO: 165 K/UL (ref 164–446)
PLATELET BLD QL SMEAR: NORMAL
PMV BLD AUTO: 11.2 FL (ref 9–12.9)
POTASSIUM SERPL-SCNC: 3.7 MMOL/L (ref 3.6–5.5)
PROT SERPL-MCNC: 6.2 G/DL (ref 6–8.2)
PROTHROMBIN TIME: 14.3 SEC (ref 12–14.6)
RBC # BLD AUTO: 3.92 M/UL (ref 4.2–5.4)
RBC BLD AUTO: PRESENT
SMUDGE CELLS BLD QL SMEAR: NORMAL
SODIUM SERPL-SCNC: 140 MMOL/L (ref 135–145)
WBC # BLD AUTO: 7.1 K/UL (ref 4.8–10.8)

## 2017-05-03 PROCEDURE — 83690 ASSAY OF LIPASE: CPT

## 2017-05-03 PROCEDURE — 85730 THROMBOPLASTIN TIME PARTIAL: CPT

## 2017-05-03 PROCEDURE — 700111 HCHG RX REV CODE 636 W/ 250 OVERRIDE (IP): Performed by: EMERGENCY MEDICINE

## 2017-05-03 PROCEDURE — G0378 HOSPITAL OBSERVATION PER HR: HCPCS

## 2017-05-03 PROCEDURE — 99285 EMERGENCY DEPT VISIT HI MDM: CPT

## 2017-05-03 PROCEDURE — 72170 X-RAY EXAM OF PELVIS: CPT

## 2017-05-03 PROCEDURE — 36415 COLL VENOUS BLD VENIPUNCTURE: CPT

## 2017-05-03 PROCEDURE — 99220 PR INITIAL OBSERVATION CARE,LEVL III: CPT | Performed by: INTERNAL MEDICINE

## 2017-05-03 PROCEDURE — 73502 X-RAY EXAM HIP UNI 2-3 VIEWS: CPT | Mod: LT

## 2017-05-03 PROCEDURE — 85027 COMPLETE CBC AUTOMATED: CPT

## 2017-05-03 PROCEDURE — 700102 HCHG RX REV CODE 250 W/ 637 OVERRIDE(OP): Performed by: INTERNAL MEDICINE

## 2017-05-03 PROCEDURE — 96375 TX/PRO/DX INJ NEW DRUG ADDON: CPT

## 2017-05-03 PROCEDURE — 71010 DX-CHEST-PORTABLE (1 VIEW): CPT

## 2017-05-03 PROCEDURE — 80053 COMPREHEN METABOLIC PANEL: CPT

## 2017-05-03 PROCEDURE — A9270 NON-COVERED ITEM OR SERVICE: HCPCS | Performed by: INTERNAL MEDICINE

## 2017-05-03 PROCEDURE — 85610 PROTHROMBIN TIME: CPT

## 2017-05-03 PROCEDURE — 96374 THER/PROPH/DIAG INJ IV PUSH: CPT

## 2017-05-03 PROCEDURE — 85007 BL SMEAR W/DIFF WBC COUNT: CPT

## 2017-05-03 RX ORDER — OMEPRAZOLE 20 MG/1
20 CAPSULE, DELAYED RELEASE ORAL DAILY
Status: DISCONTINUED | OUTPATIENT
Start: 2017-05-04 | End: 2017-05-08 | Stop reason: HOSPADM

## 2017-05-03 RX ORDER — ACETAMINOPHEN 325 MG/1
650 TABLET ORAL EVERY 6 HOURS PRN
Status: DISCONTINUED | OUTPATIENT
Start: 2017-05-03 | End: 2017-05-08 | Stop reason: HOSPADM

## 2017-05-03 RX ORDER — MORPHINE SULFATE 4 MG/ML
2 INJECTION, SOLUTION INTRAMUSCULAR; INTRAVENOUS
Status: DISCONTINUED | OUTPATIENT
Start: 2017-05-03 | End: 2017-05-08 | Stop reason: HOSPADM

## 2017-05-03 RX ORDER — ONDANSETRON 2 MG/ML
4 INJECTION INTRAMUSCULAR; INTRAVENOUS ONCE
Status: COMPLETED | OUTPATIENT
Start: 2017-05-03 | End: 2017-05-03

## 2017-05-03 RX ORDER — DIGOXIN 125 MCG
125 TABLET ORAL DAILY
Status: DISCONTINUED | OUTPATIENT
Start: 2017-05-03 | End: 2017-05-08 | Stop reason: HOSPADM

## 2017-05-03 RX ORDER — BISACODYL 10 MG
10 SUPPOSITORY, RECTAL RECTAL
Status: DISCONTINUED | OUTPATIENT
Start: 2017-05-03 | End: 2017-05-08 | Stop reason: HOSPADM

## 2017-05-03 RX ORDER — MORPHINE SULFATE 4 MG/ML
2 INJECTION, SOLUTION INTRAMUSCULAR; INTRAVENOUS ONCE
Status: COMPLETED | OUTPATIENT
Start: 2017-05-03 | End: 2017-05-03

## 2017-05-03 RX ORDER — DABIGATRAN ETEXILATE 75 MG/1
75 CAPSULE ORAL 2 TIMES DAILY
Status: DISCONTINUED | OUTPATIENT
Start: 2017-05-04 | End: 2017-05-06

## 2017-05-03 RX ORDER — ONDANSETRON 2 MG/ML
4 INJECTION INTRAMUSCULAR; INTRAVENOUS EVERY 4 HOURS PRN
Status: DISCONTINUED | OUTPATIENT
Start: 2017-05-03 | End: 2017-05-08 | Stop reason: HOSPADM

## 2017-05-03 RX ORDER — AMOXICILLIN 250 MG
2 CAPSULE ORAL 2 TIMES DAILY
Status: DISCONTINUED | OUTPATIENT
Start: 2017-05-03 | End: 2017-05-08 | Stop reason: HOSPADM

## 2017-05-03 RX ORDER — OXYCODONE HYDROCHLORIDE 5 MG/1
2.5 TABLET ORAL
Status: DISCONTINUED | OUTPATIENT
Start: 2017-05-03 | End: 2017-05-08 | Stop reason: HOSPADM

## 2017-05-03 RX ORDER — OXYCODONE HYDROCHLORIDE 5 MG/1
5 TABLET ORAL
Status: DISCONTINUED | OUTPATIENT
Start: 2017-05-03 | End: 2017-05-08 | Stop reason: HOSPADM

## 2017-05-03 RX ORDER — ASCORBIC ACID 500 MG
500 TABLET ORAL DAILY
Status: DISCONTINUED | OUTPATIENT
Start: 2017-05-04 | End: 2017-05-08 | Stop reason: HOSPADM

## 2017-05-03 RX ORDER — OXYBUTYNIN CHLORIDE 10 MG/1
10 TABLET, EXTENDED RELEASE ORAL DAILY
Status: DISCONTINUED | OUTPATIENT
Start: 2017-05-03 | End: 2017-05-08 | Stop reason: HOSPADM

## 2017-05-03 RX ORDER — POLYETHYLENE GLYCOL 3350 17 G/17G
1 POWDER, FOR SOLUTION ORAL
Status: DISCONTINUED | OUTPATIENT
Start: 2017-05-03 | End: 2017-05-08 | Stop reason: HOSPADM

## 2017-05-03 RX ORDER — MEMANTINE HYDROCHLORIDE 10 MG/1
10 TABLET ORAL DAILY
Status: DISCONTINUED | OUTPATIENT
Start: 2017-05-04 | End: 2017-05-08 | Stop reason: HOSPADM

## 2017-05-03 RX ORDER — ONDANSETRON 4 MG/1
4 TABLET, ORALLY DISINTEGRATING ORAL EVERY 4 HOURS PRN
Status: DISCONTINUED | OUTPATIENT
Start: 2017-05-03 | End: 2017-05-08 | Stop reason: HOSPADM

## 2017-05-03 RX ADMIN — ROSUVASTATIN CALCIUM 125 MCG: 10 TABLET, FILM COATED ORAL at 21:54

## 2017-05-03 RX ADMIN — MORPHINE SULFATE 2 MG: 4 INJECTION INTRAVENOUS at 20:31

## 2017-05-03 RX ADMIN — ONDANSETRON 4 MG: 2 INJECTION INTRAMUSCULAR; INTRAVENOUS at 20:31

## 2017-05-03 ASSESSMENT — LIFESTYLE VARIABLES
ALCOHOL_USE: NO
EVER_SMOKED: NEVER

## 2017-05-03 ASSESSMENT — PAIN SCALES - GENERAL
PAINLEVEL_OUTOF10: 0
PAINLEVEL_OUTOF10: 7

## 2017-05-03 NOTE — IP AVS SNAPSHOT
" <p align=\"LEFT\"><IMG SRC=\"//EMRWB/blob$/Images/[email or web address]\" alt=\"Image\" WIDTH=\"50%\" HEIGHT=\"200\" BORDER=\"\"></p>                   Name:Arlen Cruz  Medical Record Number:[id number]  CSN: [id number]    YOB: 1929   Age: 88 y.o.  Sex: female  HT:1.575 m (5' 2\") WT: 71.4 kg (157 lb 6.5 oz)          Admit Date: 5/3/2017     Discharge Date:   Today's Date: 5/8/2017  Attending Doctor:  Lucas Garcia M.D.                  Allergies:  Hctz; Avelox; and Codeine          Your appointments     Jun 26, 2017  1:40 PM   Follow Up Visit with Judy Wood M.D.   [facility] (--)    [address] [place] [address], [address]  [facility] [id number] [phone]           You will be receiving a confirmation call a few days before your appointment from our automated call confirmation system.              Follow-up Information     1. Follow up with Moris Castle M.D.. Go on 6/1/2017.    Specialty:  Orthopaedics    Why:  Please arrive at 9:30 am for a 10:00 am appointment. Thank you.     Contact information    [address]  [address]  [facility] [address]  [phone]          2. Follow up with [facility] .    Specialties:  Skilled Nursing Facility, Rehabilitation    Contact information    [address] [facility] [address]  [phone]         Medication List      Take these Medications        Instructions    ascorbic acid 500 MG Tabs   Commonly known as:  ascorbic acid    Take 500 mg by mouth every morning.   Dose:  500 mg       aspirin 325 MG Tabs   Commonly known as:  ASA    Take 325 mg by mouth every morning.   Dose:  325 mg       digoxin 125 MCG Tabs   Commonly known as:  DIGOX    Take 1 Tab by mouth every day.   Dose:  125 mcg       NAMENDA XR 28 MG Cp24   Generic drug:  Memantine HCl ER    TAKE 1 CAPSULE BY MOUTH ONCE DAILY       omeprazole 20 MG delayed-release capsule   Commonly known as:  PRILOSEC    TAKE ONE CAPSULE BY MOUTH ONCE DAILY       oxybutynin SR 5 MG Tb24   Commonly " known as:  DITROPAN-XL    Take 5 mg by mouth every morning.   Dose:  5 mg       * oxycodone immediate-release 5 MG Tabs   Commonly known as:  ROXICODONE    Take 1 Tab by mouth every 6 hours as needed (Moderate Pain (NRS Pain Scale 4-6; CPOT Pain Scale 3-5)).   Dose:  5 mg       * oxyCODONE CR 10 MG T12a   Commonly known as:  OXYCONTIN    Take 1 Tab by mouth every 12 hours.   Dose:  10 mg       sulfamethoxazole-trimethoprim 800-160 MG tablet   Commonly known as:  BACTRIM DS    Take 1 Tab by mouth every 12 hours.   Dose:  1 Tab       tramadol 50 MG Tabs   Commonly known as:  ULTRAM    Take 1 Tab by mouth every 6 hours as needed for Severe Pain.   Dose:  50 mg       * Notice:  This list has 2 medication(s) that are the same as other medications prescribed for you. Read the directions carefully, and ask your doctor or other care provider to review them with you.

## 2017-05-03 NOTE — IP AVS SNAPSHOT
Professional Aptitude Council Access Code: Activation code not generated  Current Professional Aptitude Council Status: Active    Tom[employer]ishhart  A secure, online tool to manage your health information     [employer]’s Professional Aptitude Council® is a secure, online tool that connects you to your personalized health information from the privacy of your home -- day or night - making it very easy for you to manage your healthcare. Once the activation process is completed, you can even access your medical information using the Professional Aptitude Council parveen, which is available for free in the Apple Parveen store or Google Play store.     Professional Aptitude Council provides the following levels of access (as shown below):   My Chart Features   [facility] Primary Care Doctor [facility]  Specialists [facility]  Urgent  Care Non-[facility]  Primary Care  Doctor   Email your healthcare team securely and privately 24/7 X X X X   Manage appointments: schedule your next appointment; view details of past/upcoming appointments X      Request prescription refills. X      View recent personal medical records, including lab and immunizations X X X X   View health record, including health history, allergies, medications X X X X   Read reports about your outpatient visits, procedures, consult and ER notes X X X X   See your discharge summary, which is a recap of your hospital and/or ER visit that includes your diagnosis, lab results, and care plan. X X       How to register for Professional Aptitude Council:  1. Go to  [email or web address].  2. Click on the Sign Up Now box, which takes you to the New Member Sign Up page. You will need to provide the following information:  a. Enter your Professional Aptitude Council Access Code exactly as it appears at the top of this page. (You will not need to use this code after you’ve completed the sign-up process. If you do not sign up before the expiration date, you must request a new code.)   b. Enter your date of birth.   c. Enter your home email address.   d. Click Submit, and follow the next screen’s instructions.  3. Create a Professional Aptitude Council ID. This will  be your [employer] login ID and cannot be changed, so think of one that is secure and easy to remember.  4. Create a Pinocular password. You can change your password at any time.  5. Enter your Password Reset Question and Answer. This can be used at a later time if you forget your password.   6. Enter your e-mail address. This allows you to receive e-mail notifications when new information is available in Pinocular.  7. Click Sign Up. You can now view your health information.    For assistance activating your [employer] account, call [phone]

## 2017-05-03 NOTE — IP AVS SNAPSHOT
5/8/2017    Arlen Cruz  [address]  [place] [address]    Dear Arlen Singh:    [facility] wants to ensure your discharge home is safe and you or your loved ones have had all of your questions answered regarding your care after you leave the hospital.    Below is a list of resources and contact information should you have any questions regarding your hospital stay, follow-up instructions, or active medical symptoms.    Questions or Concerns Regarding… Contact   Medical Questions Related to Your Discharge  (7 days a week, 8am-5pm) Contact a Nurse Care Coordinator   [phone]   Medical Questions Not Related to Your Discharge  (24 hours a day / 7 days a week)  Contact the Nurse Health Line   [phone]    Medications or Discharge Instructions Refer to your discharge packet   or contact your [facility] Primary Care Provider   [phone]   Follow-up Appointment(s) Schedule your appointment via [employer]   or contact Scheduling [phone]   Billing Review your statement via [employer]  or contact Billing [phone]   Medical Records Review your records via [employer]   or contact Medical Records [phone]     You may receive a telephone call within two days of discharge. This call is to make certain you understand your discharge instructions and have the opportunity to have any questions answered. You can also easily access your medical information, test results and upcoming appointments via the [employer] free online health management tool. You can learn more and sign up at [employer]/iPawn. For assistance setting up your [employer] account, please call [phone].    Once again, we want to ensure your discharge home is safe and that you have a clear understanding of any next steps in your care. If you have any questions or concerns, please do not hesitate to contact us, we are here for you. Thank you for choosing [facility] for your healthcare needs.    Sincerely,    Your [facility]  Healthcare Team

## 2017-05-03 NOTE — LETTER
[facility] - 1549  EHR eReferral Notification Requirements    To be sent by secure email to [email or web address] or   by fax to [phone]       FIELDS ARE REQUIRED TO BE COMPLETED     Patient Name:  Arlen Cruz  MRN:  [id number]   Account Number: [id number]                YOB: 1929    Date Roomed in ED:    5/3/2017   5:46 PM  Date First Observation Order Placed: 5/3/2017   8:43 PM  Date First Inpatient Order Placed:        Date of Previous Admission (Needed For Readmission Reviews Only):     Discharge Date and Time (if applicable): No discharge date for patient encounter.     PLEASE CHECK OFF TYPE OF REVIEW & CURRENT ADMISSION STATUS FROM LISTS BELOW  Type of Review:  Admission review    Dates to be Reviewed: 5/3-5/6        Current Admission Status:  Observation-Outpatient    / Contact Number for EHR outcome/recommendation call:    Saritha Luna Rn  [phone]     Attending Physician/ Contact Number (if not the same as in electronic record):  Dr. Lucas Garcia [phone]     Comments:  Chart included      Additional Information being Emailed or Faxed:  yes       Fax Handwritten Supporting Documents to EHR at [phone]      [address] [address]  [phone]  [email or web address]    Updated December 17, 2014

## 2017-05-03 NOTE — IP AVS SNAPSHOT
" Home Care Instructions                                                                                                                  Name:Arlen Cruz  Medical Record Number:[id number]  CSN: [id number]    YOB: 1929   Age: 88 y.o.  Sex: female  HT:1.575 m (5' 2\") WT: 71.4 kg (157 lb 6.5 oz)          Admit Date: 5/3/2017     Discharge Date:   Today's Date: 5/8/2017  Attending Doctor:  Lucas Garcia M.D.                  Allergies:  Hctz; Avelox; and Codeine            Discharge Instructions       Discharge Instructions    Discharged to group home by medical transportation with escort. Discharged via ambulance, hospital escort: Yes.  Special equipment needed: Not Applicable    Be sure to schedule a follow-up appointment with your primary care doctor or any specialists as instructed.     Discharge Plan:   Diet Plan: Discussed  Activity Level: Discussed  Confirmed Follow up Appointment: Appointment Scheduled  Confirmed Symptoms Management: Discussed  Medication Reconciliation Updated: Yes  Influenza Vaccine Indication: Not indicated: Previously immunized this influenza season and > 8 years of age    I understand that a diet low in cholesterol, fat, and sodium is recommended for good health. Unless I have been given specific instructions below for another diet, I accept this instruction as my diet prescription.   Other diet: Regular    Special Instructions: None    · Is patient discharged on Warfarin / Coumadin?   No     · Is patient Post Blood Transfusion?  No    Depression / Suicide Risk    As you are discharged from this [facility] facility, it is important to learn how to keep safe from harming yourself.    Recognize the warning signs:  · Abrupt changes in personality, positive or negative- including increase in energy   · Giving away possessions  · Change in eating patterns- significant weight changes-  positive or negative  · Change in sleeping patterns- unable to sleep or sleeping all " the time   · Unwillingness or inability to communicate  · Depression  · Unusual sadness, discouragement and loneliness  · Talk of wanting to die  · Neglect of personal appearance   · Rebelliousness- reckless behavior  · Withdrawal from people/activities they love  · Confusion- inability to concentrate     If you or a loved one observes any of these behaviors or has concerns about self-harm, here's what you can do:  · Talk about it- your feelings and reasons for harming yourself  · Remove any means that you might use to hurt yourself (examples: pills, rope, extension cords, firearm)  · Get professional help from the community (Mental Health, Substance Abuse, psychological counseling)  · Do not be alone:Call your Safe Contact- someone whom you trust who will be there for you.  · Call your local CRISIS HOTLINE 048-0688 or 439-154-0504  · Call your local Children's Mobile Crisis Response Team [place] (105) 057-6085 or www.[employer]  · Call the toll free National Suicide Prevention Hotlines   · National Suicide Prevention Lifeline 695-955-KJHS (6181)  · [place] [facility] 800-SUICIDE (930-3907)        Your appointments     Jun 26, 2017  1:40 PM   Follow Up Visit with Judy Wood M.D.   [facility] (--)    [address], [address]  [facility] [address] [phone]           You will be receiving a confirmation call a few days before your appointment from our automated call confirmation system.              Follow-up Information     1. Follow up with Moris Castle M.D.. Go on 6/1/2017.    Specialty:  Orthopaedics    Why:  Please arrive at 9:30 am for a 10:00 am appointment. Thank you.     Contact information    [address]  [address]  [facility] [id number] [phone]          2. Follow up with [facility] .    Specialties:  Skilled Nursing Facility, Rehabilitation    Contact information    [address]  [facility] [address]  [phone]         Discharge  Medication Instructions:    Below are the medications your physician expects you to take upon discharge:    Review all your home medications and newly ordered medications with your doctor and/or pharmacist. Follow medication instructions as directed by your doctor and/or pharmacist.    Please keep your medication list with you and share with your physician.               Medication List      START taking these medications        Instructions    Morning Afternoon Evening Bedtime    * oxycodone immediate-release 5 MG Tabs   Last time this was given:  5 mg on 5/7/2017  8:45 AM   Commonly known as:  ROXICODONE        Take 1 Tab by mouth every 6 hours as needed (Moderate Pain (NRS Pain Scale 4-6; CPOT Pain Scale 3-5)).   Dose:  5 mg                        * oxyCODONE CR 10 MG T12a   Last time this was given:  10 mg on 5/8/2017  8:12 AM   Commonly known as:  OXYCONTIN        Take 1 Tab by mouth every 12 hours.   Dose:  10 mg                        sulfamethoxazole-trimethoprim 800-160 MG tablet   Last time this was given:  1 Tab on 5/8/2017  8:12 AM   Commonly known as:  BACTRIM DS        Take 1 Tab by mouth every 12 hours.   Dose:  1 Tab                        tramadol 50 MG Tabs   Last time this was given:  50 mg on 5/4/2017  8:51 AM   Commonly known as:  ULTRAM        Take 1 Tab by mouth every 6 hours as needed for Severe Pain.   Dose:  50 mg                        * Notice:  This list has 2 medication(s) that are the same as other medications prescribed for you. Read the directions carefully, and ask your doctor or other care provider to review them with you.      CONTINUE taking these medications        Instructions    Morning Afternoon Evening Bedtime    ascorbic acid 500 MG Tabs   Last time this was given:  500 mg on 5/8/2017  8:12 AM   Commonly known as:  ascorbic acid        Take 500 mg by mouth every morning.   Dose:  500 mg                        aspirin 325 MG Tabs   Commonly known as:  ASA        Take 325 mg  by mouth every morning.   Dose:  325 mg                        digoxin 125 MCG Tabs   Last time this was given:  125 mcg on 5/7/2017  5:22 PM   Commonly known as:  DIGOX        Take 1 Tab by mouth every day.   Dose:  125 mcg                        NAMENDA XR 28 MG Cp24   Generic drug:  Memantine HCl ER        TAKE 1 CAPSULE BY MOUTH ONCE DAILY                        omeprazole 20 MG delayed-release capsule   Last time this was given:  20 mg on 5/8/2017  8:12 AM   Commonly known as:  PRILOSEC        TAKE ONE CAPSULE BY MOUTH ONCE DAILY                        oxybutynin SR 5 MG Tb24   Last time this was given:  10 mg on 5/8/2017  8:12 AM   Commonly known as:  DITROPAN-XL        Take 5 mg by mouth every morning.   Dose:  5 mg                             Where to Get Your Medications      Information about where to get these medications is not yet available     ! Ask your nurse or doctor about these medications    - oxyCODONE CR 10 MG T12a  - oxycodone immediate-release 5 MG Tabs  - sulfamethoxazole-trimethoprim 800-160 MG tablet  - tramadol 50 MG Tabs            Orders for after discharge     REFERRAL TO SKILLED NURSING FACILITY    Complete by:  As directed              Instructions           Diet / Nutrition:    Follow any diet instructions given to you by your doctor or the dietician, including how much salt (sodium) you are allowed each day.    If you are overweight, talk to your doctor about a weight reduction plan.    Activity:    Remain physically active following your doctor's instructions about exercise and activity.    Rest often.     Any time you become even a little tired or short of breath, SIT DOWN and rest.    Worsening Symptoms:    Report any of the following signs and symptoms to the doctor's office immediately:    *Pain of jaw, arm, or neck  *Chest pain not relieved by medication                               *Dizziness or loss of consciousness  *Difficulty breathing even when at rest   *More tired  than usual                                       *Bleeding drainage or swelling of surgical site  *Swelling of feet, ankles, legs or stomach                 *Fever (>100ºF)  *Pink or blood tinged sputum  *Weight gain (3lbs/day or 5lbs /week)           *Shock from internal defibrillator (if applicable)  *Palpitations or irregular heartbeats                *Cool and/or numb extremities    Stroke Awareness    Common Risk Factors for Stroke include:    Age  Atrial Fibrillation  Carotid Artery Stenosis  Diabetes Mellitus  Excessive alcohol consumption  High blood pressure  Overweight   Physical inactivity  Smoking    Warning signs and symptoms of a stroke include:    *Sudden numbness or weakness of the face, arm or leg (especially on one side of the body).  *Sudden confusion, trouble speaking or understanding.  *Sudden trouble seeing in one or both eyes.  *Sudden trouble walking, dizziness, loss of balance or coordination.Sudden severe headache with no known cause.    It is very important to get treatment quickly when a stroke occurs. If you experience any of the above warning signs, call 911 immediately.                   Disclaimer         Quit Smoking / Tobacco Use:    I understand the use of any tobacco products increases my chance of suffering from future heart disease or stroke and could cause other illnesses which may shorten my life. Quitting the use of tobacco products is the single most important thing I can do to improve my health. For further information on smoking / tobacco cessation call a Toll Free Quit Line at 1-773.610.6053 (*[employer]) or 1-869.308.9759 (American Lung Association) or you can access the web based program at www.lungusa.org.    [place] Tobacco Users Help Line:  (323) 287-9671       Toll Free: 1-411.590.2762  Quit Tobacco Program [facility] (008)806-2272    Crisis Hotline:    National Crisis Hotline:  9-884-WOBAQKA or 1-853.237.6689    [place] Crisis  Hotline:    1-371.548.5558 or 575-480-6728    Discharge Survey:   Thank you for choosing [facility]. We hope we did everything we could to make your hospital stay a pleasant one. You may be receiving a phone survey and we would appreciate your time and participation in answering the questions. Your input is very valuable to us in our efforts to improve our service to our patients and their families.        My signature on this form indicates that:    1. I have reviewed and understand the above information.  2. My questions regarding this information have been answered to my satisfaction.  3. I have formulated a plan with my discharge nurse to obtain my prescribed medications for home.                  Disclaimer         __________________________________                     __________       ________                       Patient Signature                                                 Date                    Time

## 2017-05-04 ENCOUNTER — APPOINTMENT (OUTPATIENT)
Dept: RADIOLOGY | Facility: MEDICAL CENTER | Age: 82
DRG: 536 | End: 2017-05-04
Attending: INTERNAL MEDICINE
Payer: MEDICARE

## 2017-05-04 LAB
ANION GAP SERPL CALC-SCNC: 6 MMOL/L (ref 0–11.9)
APPEARANCE UR: ABNORMAL
BACTERIA #/AREA URNS HPF: ABNORMAL /HPF
BASOPHILS # BLD AUTO: 0.5 % (ref 0–1.8)
BASOPHILS # BLD: 0.04 K/UL (ref 0–0.12)
BILIRUB UR QL STRIP.AUTO: NEGATIVE
BUN SERPL-MCNC: 16 MG/DL (ref 8–22)
CALCIUM SERPL-MCNC: 8.9 MG/DL (ref 8.5–10.5)
CHLORIDE SERPL-SCNC: 103 MMOL/L (ref 96–112)
CO2 SERPL-SCNC: 26 MMOL/L (ref 20–33)
COLOR UR: YELLOW
CREAT SERPL-MCNC: 0.82 MG/DL (ref 0.5–1.4)
EOSINOPHIL # BLD AUTO: 0.03 K/UL (ref 0–0.51)
EOSINOPHIL NFR BLD: 0.4 % (ref 0–6.9)
EPI CELLS #/AREA URNS HPF: ABNORMAL /HPF
ERYTHROCYTE [DISTWIDTH] IN BLOOD BY AUTOMATED COUNT: 52.9 FL (ref 35.9–50)
GFR SERPL CREATININE-BSD FRML MDRD: >60 ML/MIN/1.73 M 2
GLUCOSE SERPL-MCNC: 139 MG/DL (ref 65–99)
GLUCOSE UR STRIP.AUTO-MCNC: NEGATIVE MG/DL
HCT VFR BLD AUTO: 31.9 % (ref 37–47)
HGB BLD-MCNC: 10.3 G/DL (ref 12–16)
IMM GRANULOCYTES # BLD AUTO: 0.07 K/UL (ref 0–0.11)
IMM GRANULOCYTES NFR BLD AUTO: 0.9 % (ref 0–0.9)
KETONES UR STRIP.AUTO-MCNC: NEGATIVE MG/DL
LEUKOCYTE ESTERASE UR QL STRIP.AUTO: ABNORMAL
LYMPHOCYTES # BLD AUTO: 2.39 K/UL (ref 1–4.8)
LYMPHOCYTES NFR BLD: 29.3 % (ref 22–41)
MCH RBC QN AUTO: 28.1 PG (ref 27–33)
MCHC RBC AUTO-ENTMCNC: 32.3 G/DL (ref 33.6–35)
MCV RBC AUTO: 87.2 FL (ref 81.4–97.8)
MICRO URNS: ABNORMAL
MONOCYTES # BLD AUTO: 0.59 K/UL (ref 0–0.85)
MONOCYTES NFR BLD AUTO: 7.2 % (ref 0–13.4)
MUCOUS THREADS #/AREA URNS HPF: ABNORMAL /HPF
NEUTROPHILS # BLD AUTO: 5.03 K/UL (ref 2–7.15)
NEUTROPHILS NFR BLD: 61.7 % (ref 44–72)
NITRITE UR QL STRIP.AUTO: NEGATIVE
NRBC # BLD AUTO: 0 K/UL
NRBC BLD AUTO-RTO: 0 /100 WBC
PH UR STRIP.AUTO: 5.5 [PH]
PLATELET # BLD AUTO: 163 K/UL (ref 164–446)
PMV BLD AUTO: 11.2 FL (ref 9–12.9)
POTASSIUM SERPL-SCNC: 3.8 MMOL/L (ref 3.6–5.5)
PROT UR QL STRIP: NEGATIVE MG/DL
RBC # BLD AUTO: 3.66 M/UL (ref 4.2–5.4)
RBC # URNS HPF: ABNORMAL /HPF
RBC UR QL AUTO: NEGATIVE
SODIUM SERPL-SCNC: 135 MMOL/L (ref 135–145)
SP GR UR STRIP.AUTO: 1.01
WBC # BLD AUTO: 8.2 K/UL (ref 4.8–10.8)
WBC #/AREA URNS HPF: ABNORMAL /HPF

## 2017-05-04 PROCEDURE — 81001 URINALYSIS AUTO W/SCOPE: CPT

## 2017-05-04 PROCEDURE — 71010 DX-CHEST-PORTABLE (1 VIEW): CPT

## 2017-05-04 PROCEDURE — 36415 COLL VENOUS BLD VENIPUNCTURE: CPT

## 2017-05-04 PROCEDURE — G8987 SELF CARE CURRENT STATUS: HCPCS | Mod: CM

## 2017-05-04 PROCEDURE — G8988 SELF CARE GOAL STATUS: HCPCS | Mod: CK

## 2017-05-04 PROCEDURE — 700102 HCHG RX REV CODE 250 W/ 637 OVERRIDE(OP): Performed by: INTERNAL MEDICINE

## 2017-05-04 PROCEDURE — 80048 BASIC METABOLIC PNL TOTAL CA: CPT

## 2017-05-04 PROCEDURE — A9270 NON-COVERED ITEM OR SERVICE: HCPCS | Performed by: INTERNAL MEDICINE

## 2017-05-04 PROCEDURE — 87086 URINE CULTURE/COLONY COUNT: CPT

## 2017-05-04 PROCEDURE — G0378 HOSPITAL OBSERVATION PER HR: HCPCS

## 2017-05-04 PROCEDURE — 97165 OT EVAL LOW COMPLEX 30 MIN: CPT

## 2017-05-04 PROCEDURE — 85025 COMPLETE CBC W/AUTO DIFF WBC: CPT

## 2017-05-04 PROCEDURE — 99226 PR SUBSEQUENT OBSERVATION CARE,LEVEL III: CPT | Performed by: INTERNAL MEDICINE

## 2017-05-04 RX ORDER — TRAMADOL HYDROCHLORIDE 50 MG/1
50 TABLET ORAL EVERY 6 HOURS PRN
Status: DISCONTINUED | OUTPATIENT
Start: 2017-05-04 | End: 2017-05-08 | Stop reason: HOSPADM

## 2017-05-04 RX ORDER — LORAZEPAM 2 MG/ML
.5-1 INJECTION INTRAMUSCULAR ONCE
Status: COMPLETED | OUTPATIENT
Start: 2017-05-04 | End: 2017-05-05

## 2017-05-04 RX ADMIN — OXYBUTYNIN CHLORIDE 10 MG: 10 TABLET, FILM COATED, EXTENDED RELEASE ORAL at 11:43

## 2017-05-04 RX ADMIN — OMEPRAZOLE 20 MG: 20 CAPSULE, DELAYED RELEASE ORAL at 08:52

## 2017-05-04 RX ADMIN — OXYCODONE HYDROCHLORIDE 5 MG: 5 TABLET ORAL at 11:43

## 2017-05-04 RX ADMIN — DABIGATRAN ETEXILATE MESYLATE 75 MG: 75 CAPSULE ORAL at 21:04

## 2017-05-04 RX ADMIN — MEMANTINE HYDROCHLORIDE 10 MG: 10 TABLET ORAL at 08:52

## 2017-05-04 RX ADMIN — DABIGATRAN ETEXILATE MESYLATE 75 MG: 75 CAPSULE ORAL at 08:53

## 2017-05-04 RX ADMIN — OXYCODONE HYDROCHLORIDE AND ACETAMINOPHEN 500 MG: 500 TABLET ORAL at 08:52

## 2017-05-04 RX ADMIN — STANDARDIZED SENNA CONCENTRATE AND DOCUSATE SODIUM 2 TABLET: 8.6; 5 TABLET, FILM COATED ORAL at 21:04

## 2017-05-04 RX ADMIN — ROSUVASTATIN CALCIUM 125 MCG: 10 TABLET, FILM COATED ORAL at 19:12

## 2017-05-04 RX ADMIN — TRAMADOL HYDROCHLORIDE 50 MG: 50 TABLET, FILM COATED ORAL at 08:51

## 2017-05-04 RX ADMIN — STANDARDIZED SENNA CONCENTRATE AND DOCUSATE SODIUM 2 TABLET: 8.6; 5 TABLET, FILM COATED ORAL at 08:53

## 2017-05-04 ASSESSMENT — ENCOUNTER SYMPTOMS
EYES NEGATIVE: 1
BRUISES/BLEEDS EASILY: 0
NECK PAIN: 0
CARDIOVASCULAR NEGATIVE: 1
GASTROINTESTINAL NEGATIVE: 1
COUGH: 0
MYALGIAS: 1
BLURRED VISION: 0
HEARTBURN: 0
PSYCHIATRIC NEGATIVE: 1
WEAKNESS: 1
DEPRESSION: 0
DIZZINESS: 0
FEVER: 0
HEADACHES: 0
RESPIRATORY NEGATIVE: 1

## 2017-05-04 ASSESSMENT — PAIN SCALES - GENERAL
PAINLEVEL_OUTOF10: ASSUMED PAIN PRESENT

## 2017-05-04 NOTE — ED NOTES
Unable to complete med rec. Pt resides at [facility], per RN is unable to look up pt's information. Will have AM morning med rec follow up with medical records tomorrow morning.

## 2017-05-04 NOTE — RESPIRATORY CARE
COPD EDUCATION by COPD CLINICAL EDUCATOR  5/4/2017 at 7:23 AM by Cyndi Aranda     Patient reviewed by COPD education team. Patient does not qualify for COPD program.

## 2017-05-04 NOTE — CARE PLAN
Problem: Pain Management  Goal: Pain level will decrease to patient’s comfort goal  Outcome: PROGRESSING AS EXPECTED    Problem: Mobility  Goal: Risk for activity intolerance will decrease  Outcome: PROGRESSING AS EXPECTED

## 2017-05-04 NOTE — PROGRESS NOTES
Spoke with Daughter, Isabel Hyatt who is her POA who states she has no metal and can do the MRI with some sedation.

## 2017-05-04 NOTE — THERAPY
Physical Therapy Contact Note    PT consult received, pt nonparticipatory with OT this AM; will hold eval and wait on MRI for best plan of care for mobility/evaluation;     Piedad Berg, PT, DPT Pager: [phone]

## 2017-05-04 NOTE — ED NOTES
Pt to rm 37 with c/o mechanical GLF.  Tripped at home and injured her left hip.  No obvious signs of deformity.  Patient was in a lot of pain when EMS arrived so they started an IV and gave 50 fentanyl.  Denies pain at this time.  Patient is poor historian.  Has hx of dementia/alheimers.  Does not remember what happened.  Fall witness states patient did not lose consciousness.

## 2017-05-04 NOTE — ED PROVIDER NOTES
ER Provider Note     Scribed for Claudio Martin, * by Nicho Zee. 5/3/2017, 6:27 PM.    Primary Care Provider: Stuart Santacruz M.D.  Means of Arrival: Ambulance   History obtained from: Patient  History limited by: none     CHIEF COMPLAINT   Chief Complaint   Patient presents with   • T-5000 GLF     mechanical   • Hip Pain       HPI   Arlen Cruz is a 88 y.o. who presents to the emergency department for evaluation of a mechanical ground level fall, which occurred just prior to arrival. Per EMS, daughter witnessed the fall. She reports the patient tripped and fell injuring her left hip. She did not hit her head or lose consciousness. At that time she had significant pain and was given Fentanyl for her pain. She does not recall this incident. Patient denies any pain at this time.     REVIEW OF SYSTEMS   Musculoskeletal: left hip pain.   All other systems reviewed and are negativeC.     PAST MEDICAL HISTORY  Past Medical History   Diagnosis Date   • Stress incontinence 6/19/2009   • Dyslipidemia 6/19/2009   • HTN (hypertension), benign 6/19/2009   • OSTEOPOROSIS 6/19/2009   • Hyperlipidemia    • Atrial fibrillation (CMS-[facility])    • Psychiatric disorder    • Allergy, unspecified not elsewhere classified    • Arthritis    • Unspecified cataract    • CHF (congestive heart failure) (CMS-[facility])    • Chronic airway obstruction, not elsewhere classified    • Depression    • GERD (gastroesophageal reflux disease)    • Kidney disease    • Stroke (CMS-[facility])    • Urinary tract infection, site not specified        SURGICAL HISTORY  Past Surgical History   Procedure Laterality Date   • Cholecystectomy     • Abdominal hysterectomy total         SOCIAL HISTORY  Social History   Substance Use Topics   • Smoking status: Never Smoker    • Smokeless tobacco: Never Used   • Alcohol Use: No       CURRENT MEDICATIONS  Previous Medications    ASCORBIC ACID (ASCORBIC ACID) 500 MG TAB    Take 500 mg by mouth every day.     "DIGOXIN (DIGOX) 125 MCG TAB    Take 1 Tab by mouth every day.    NAMENDA XR 28 MG CAPSULE SR 24 HR    TAKE 1 CAPSULE BY MOUTH ONCE DAILY    OMEPRAZOLE (PRILOSEC) 20 MG DELAYED-RELEASE CAPSULE    TAKE ONE CAPSULE BY MOUTH ONCE DAILY    PRADAXA 75 MG CAP CAPSULE    TAKE 1 CAPSULE BY MOUTH TWICE DAILY    PREDNISONE (DELTASONE) 10 MG TAB    4 tabs qd x 3d, 3 tabs qd x 3d, 2 tabs q x 3d, 1 tab qd x 3d.    VESICARE 10 MG TABLET    TAKE 1 TABLET BY MOUTH EVERY DAY       ALLERGIES     Allergies   Allergen Reactions   • Hctz Unspecified     Hyponatremia     • Avelox [Moxifloxacin Hcl In Nacl]    • Codeine Rash             PHYSICAL EXAM   Vital Signs: /76 mmHg  Pulse 84  Temp(Src) 36.9 °C (98.4 °F)  Resp 16  Ht 1.575 m (5' 2\")  Wt 68.04 kg (150 lb)  BMI 27.43 kg/m2  Constitutional: Elderly. Well developed, Well nourished, No acute distress, Non-toxic appearance.   Psychiatric: Calm. Not anxious.  HENT:  Slight dry oral mucosa. Oropharynx: no exudate no erythema  Eyes: PERRLA, EOMI, Conjunctiva normal, No discharge.   Musculoskeletal: Pain in left groin with flexion of the hip. No pain and good range of motion of ankles and knees. Neck is soft and supple no meningismus  Lymphatic: No cervical lymphadenopathy noted.   Cardiovascular: Normal heart rate, Normal rhythm, No murmurs, Negative Homans, no pedal edema, good equal pedal pulses.  Pulmonary: Lungs are clear to auscultation bilaterally. No wheezes rales or rhonchi  Abdomen: Bowel sounds normal, soft nondistended and nontender. No rebound and no guarding .  Skin: Warm, Dry, No erythema, No rash.   : No CVA tenderness.   Neurologic: Alert & oriented, moves all extremities normally. Good sensation to light touch on all extremities.    DIAGNOSTIC STUDIES/PROCEDURES  Labs:   Results for orders placed or performed during the hospital encounter of 05/03/17   CBC w/ Differential   Result Value Ref Range    WBC 7.1 4.8 - 10.8 K/uL    RBC 3.92 (L) 4.20 - 5.40 M/uL    " Hemoglobin 11.1 (L) 12.0 - 16.0 g/dL    Hematocrit 34.8 (L) 37.0 - 47.0 %    MCV 88.8 81.4 - 97.8 fL    MCH 28.3 27.0 - 33.0 pg    MCHC 31.9 (L) 33.6 - 35.0 g/dL    RDW 54.4 (H) 35.9 - 50.0 fL    Platelet Count 165 164 - 446 K/uL    MPV 11.2 9.0 - 12.9 fL    Nucleated RBC 0.00 /100 WBC    NRBC (Absolute) 0.00 K/uL    Neutrophils-Polys 70.90 44.00 - 72.00 %    Lymphocytes 23.70 22.00 - 41.00 %    Monocytes 2.70 0.00 - 13.40 %    Eosinophils 0.90 0.00 - 6.90 %    Basophils 0.90 0.00 - 1.80 %    Neutrophils (Absolute) 5.03 2.00 - 7.15 K/uL    Lymphs (Absolute) 1.68 1.00 - 4.80 K/uL    Monos (Absolute) 0.19 0.00 - 0.85 K/uL    Eos (Absolute) 0.06 0.00 - 0.51 K/uL    Baso (Absolute) 0.06 0.00 - 0.12 K/uL   Complete Metabolic Panel (CMP)   Result Value Ref Range    Sodium 140 135 - 145 mmol/L    Potassium 3.7 3.6 - 5.5 mmol/L    Chloride 105 96 - 112 mmol/L    Co2 26 20 - 33 mmol/L    Anion Gap 9.0 0.0 - 11.9    Glucose 105 (H) 65 - 99 mg/dL    Bun 17 8 - 22 mg/dL    Creatinine 0.89 0.50 - 1.40 mg/dL    Calcium 9.1 8.5 - 10.5 mg/dL    AST(SGOT) 20 12 - 45 U/L    ALT(SGPT) 17 2 - 50 U/L    Alkaline Phosphatase 61 30 - 99 U/L    Total Bilirubin 0.5 0.1 - 1.5 mg/dL    Albumin 3.3 3.2 - 4.9 g/dL    Total Protein 6.2 6.0 - 8.2 g/dL    Globulin 2.9 1.9 - 3.5 g/dL    A-G Ratio 1.1 g/dL   Lipase   Result Value Ref Range    Lipase 25 11 - 82 U/L   PT/INR   Result Value Ref Range    PT 14.3 12.0 - 14.6 sec    INR 1.08 0.87 - 1.13   APTT   Result Value Ref Range    APTT 31.9 24.7 - 36.0 sec   ESTIMATED GFR   Result Value Ref Range    GFR If African American >60 >60 mL/min/1.73 m 2    GFR If Non African American 60 >60 mL/min/1.73 m 2   DIFFERENTIAL MANUAL   Result Value Ref Range    Myelocytes 0.90 %    Manual Diff Status PERFORMED    PERIPHERAL SMEAR REVIEW   Result Value Ref Range    Peripheral Smear Review see below    PLATELET ESTIMATE   Result Value Ref Range    Plt Estimation Normal    MORPHOLOGY   Result Value Ref Range     RBC Morphology Present     Smudge Cells Few    All labs reviewed by me.    Radiology:   DX-CHEST-PORTABLE (1 VIEW)   Final Result      Right upper lobe atelectasis/possible pneumonitis.   Right lung base atelectasis possible pneumonitis.   No pneumothorax identified.      DX-HIP-COMPLETE - UNILATERAL 2+ LEFT   Final Result      No radiographic evidence of acute traumatic injury left hip.      DX-PELVIS-1 OR 2 VIEWS   Final Result      Negative AP view of the pelvis.      MR-HIP-W/O LEFT    (Results Pending)   DX-CHEST-PORTABLE (1 VIEW)    (Results Pending)     The radiologist's interpretation of all radiological studies have been reviewed by me.    COURSE & MEDICAL DECISION MAKING   Nursing notes, VS, PMSFSHx reviewed in chart     Differential Diagnoses: (include but are not limited to) Hip fracture and contusion. Patient can range her left hip but cannot ambulate. At this point we must rule out fracture.    6:27 PM - Patient was evaluated; DX hip, DX pelvis, DX chest, CBC with differential, CMP, Lipase, PTT, APTT,  ordered. The patient will be medicated with  for her symptoms. Will perform X-ray to rule out fracture.     X-rays are grossly normal. In addition I's are grossly normal she has some anemia. Patient at this point does not have a definitive fracture where patient is still in pain and with her age my recommendation be admitted some aggressive physical therapy while we consider MRI in the morning.      7:57 PM Patient reevaluated at bedside. Patient is resting. Discussed the need for admitting the patient. She understands and agrees to be admitted.     8:33 PM Paged Dr. Toney the admitting physician    8:33 PM - I discussed the patient's case and the above findings with Dr. Toney (Hospitalist) who agrees to admit the patient.     DISPOSITION:  Patient will be admitted to Dr. Toney in guarded condition.      FINAL IMPRESSION   1. Pain of left hip joint         I, Nicho Zee (Scribe), am scribing for, and  in the presence of, Claudio Martin, *.    Electronically signed by: Nicho Zee (Scribe), 5/3/2017    I, Claudio Martin, * personally performed the services described in this documentation, as scribed by Nicho Zee in my presence, and it is both accurate and complete.    The note accurately reflects work and decisions made by me.  Claudio Martin  5/3/2017  11:03 PM

## 2017-05-04 NOTE — H&P
[facility]    PATIENT INFORMATION  Patient Name: Arlen Cruz   Medical Record Number: [id number]  YOB: 1929  Date of Admission: 5/3/2017    Primary Care Provider: Marisa Santana M.D.    Consults:  None    Chief Complaint:  Chief Complaint   Patient presents with   • T-5000 GLF     mechanical   • Hip Pain       Subjective:  History of Present Illness:  Arlen Cruz is a 88 y.o. female w hx of afib on pradaxa, dementia, CKD III, chronic respiratory failure with hypoxia, HTN, HLD admitted from [facility] after a ground level fall.  She is a very poor historian due to dementia so most accurate history was obtained from chart and from ERP.  Fall was witnessed by the patient's daughter and patient is unable to tell me how she fell or how she got to the hospital.  She says she doesn't know if she walked after the fall and is only complaining of mild left hip pain at this time.  Per the daughter the patient tripped and did not hit her head and did not lose consciousness.  She was unable to stand following the event but did not complain of chest pain, dizziness.    Rcnt abx-No    ROS:  All other systems reviewed and are negative.  Unable to fully obtain due to patient's dementia.    ER Course:  L Hip negative for fracture, pt not able to comfortably put weight on foot so admitted for pain control, PT/OT and MRI to eval for occult fracture    Social HX:  Social History   Substance Use Topics   • Smoking status: Never Smoker    • Smokeless tobacco: Never Used   • Alcohol Use: No       Past Medical History   Diagnosis Date   • Stress incontinence 6/19/2009   • Dyslipidemia 6/19/2009   • HTN (hypertension), benign 6/19/2009   • OSTEOPOROSIS 6/19/2009   • Hyperlipidemia    • Atrial fibrillation (CMS-HCC)    • Psychiatric disorder    • Allergy, unspecified not elsewhere classified    • Arthritis    • Unspecified cataract    • CHF (congestive heart failure)  "(CMS-HCC)    • Chronic airway obstruction, not elsewhere classified    • Depression    • GERD (gastroesophageal reflux disease)    • Kidney disease    • Stroke (CMS-HCC)    • Urinary tract infection, site not specified        Past Surgical History   Procedure Laterality Date   • Cholecystectomy     • Abdominal hysterectomy total         ALLERGIES:  Allergies   Allergen Reactions   • Hctz Unspecified     Hyponatremia     • Avelox [Moxifloxacin Hcl In Nacl]    • Codeine Rash             MEDICATIONS PRIOR TO ADMISSION:  Prescriptions prior to admission   Medication Sig Dispense Refill Last Dose   • predniSONE (DELTASONE) 10 MG Tab 4 tabs qd x 3d, 3 tabs qd x 3d, 2 tabs q x 3d, 1 tab qd x 3d.      • ascorbic acid (ASCORBIC ACID) 500 MG Tab Take 500 mg by mouth every day.      • PRADAXA 75 MG Cap capsule TAKE 1 CAPSULE BY MOUTH TWICE DAILY 60 Cap 3    • VESICARE 10 MG tablet TAKE 1 TABLET BY MOUTH EVERY DAY 30 Tab 11    • NAMENDA XR 28 MG CAPSULE SR 24 HR TAKE 1 CAPSULE BY MOUTH ONCE DAILY 30 Cap 6    • omeprazole (PRILOSEC) 20 MG delayed-release capsule TAKE ONE CAPSULE BY MOUTH ONCE DAILY 30 Cap 11    • digoxin (DIGOX) 125 MCG Tab Take 1 Tab by mouth every day. 30 Tab 11        FAMILY HISTORY:  Family History   Problem Relation Age of Onset   • Hypertension Father    • Cancer Other    • Fainting Mother          Objective:  Vital Signs: Last Filed Vitals Signs  Blood pressure 167/95, pulse 70, temperature 37.3 °C (99.2 °F), resp. rate 18, height 1.575 m (5' 2\"), weight 71.4 kg (157 lb 6.5 oz), SpO2 100 %.  3L supplemental oxygen    Physical  General: NAD, comfortable, sitting in bed, nontoxic appearing  Psych: Alert, oriented to self only.  Poor judgement/insight  Eyes: PERRL, no scleral injection, eye lids normal  Neck: trachea midline, non palp thyroid  Hrt: RRR, no murmur, no edema, radial and DP pulses are 2+ bilaterally  Lung: CTAB, no wheezes/rhonchi, good air respiratory effort/accessory muscle use  Abd: soft " NT/ND, no rebound, no hepatomegaly  EXT:: L hip non-tender to palpation, decreased ROM of L hip.  Both feet have symmetrical/intact pulses, normal temp.  No e/o bruising  Neuro: CN intact, Sensation symmetrical in upper and lower extremities bilaterally    RECENT LAB VALUES:    Recent Labs      05/03/17   1836   WBC  7.1   RBC  3.92*   HEMOGLOBIN  11.1*   HEMATOCRIT  34.8*   MCV  88.8   MCH  28.3   RDW  54.4*   PLATELETCT  165   MPV  11.2   NEUTSPOLYS  70.90   LYMPHOCYTES  23.70   MONOCYTES  2.70   EOSINOPHILS  0.90   BASOPHILS  0.90   RBCMORPHOLO  Present     Recent Labs      05/03/17   1836   SODIUM  140   POTASSIUM  3.7   CHLORIDE  105   CO2  26   GLUCOSE  105*   BUN  17   CREATININE  0.89     Imaging Studies:   CXR: Reviewed independently and per radiology     Dx-chest-portable (1 View)  5/3/2017  Right upper lobe atelectasis/possible pneumonitis. Right lung base atelectasis possible pneumonitis. No pneumothorax identified.    Dx-pelvis-1 Or 2 Views  5/3/2017  Negative AP view of the pelvis.    Dx-hip-complete - Unilateral 2+ Leftent. There is mild degenerative change of the left hip. No acute fracture, dislocation, or joint effusion is identified.     5/3/2017  No radiographic evidence of acute traumatic injury left hip.      ASSESSMENT/PLAN:  Arlen Cruz is a 88 y.o. female w/ h/o chronic respiratory failure on 2L at baseline and advanced dementia, here with left hip pain after mechanical ground level fall.    Patient Active Hospital Problem List:   Left hip pain - high risk for fracture, negative xray.  Needs pain control, pt/ot and MRI to rule out occult fracture.  -MRI w/o contrast ordered  -Pain control w tylenol as needed  -Tramadol if needed  -Avoid narcotics if able     HTN (hypertension)  -Controlled  -Monitor     Chronic respiratory failure with hypoxia (CMS-HCC)- she appears at baseline O2, lungs clear but CXR suggested atelectasis vs pneumonitis.  -Continue home O2  -Repeat xray in  AM  -Hold off on abx  -IS  -Consider BNP     Afib / Sick sinus syndrome (CMS-HCC) - on pradaxa (Hemorrhagic disorder due to extrinsic circulating anticoagulants)  -Digoxin 125 daily  -Pradaxa 75 BID     CKD (chronic kidney disease), stage III -Cr is at baseline  -Monitor Cr Daily  -Watch UOP  -Hold nephrotoxins     Unstable gait   -PT/OT     Gastroesophageal reflux disease without esophagitis   -Omeprazole     Late onset Alzheimer's disease without behavioral disturbance   -Namenda     Normocytic anemia   -Repeat in AM  -Monitor for bleeding while on blood thinner    Prophylaxis Issues:   --DVT: Pradaxa   --GI: Omep   --Bowel: Bowel Reg ordered   --PT/OT: Ordered  --Discharge plan --> suspect to SNF vs back to [facility]    OBS: Given need for pain control and further workup    Her code status is Full Code    Elizabeth Aguilar D.O.

## 2017-05-04 NOTE — PROGRESS NOTES
Hospital Medicine Progress Note, Adult, Complex               Author: Lucas Garcia Date & Time created: 5/4/2017  12:58 PM     Interval History:  Awaiting MRI, will make ativan available.  Pain better but poor historian.    Review of Systems:  Review of Systems   Constitutional: Positive for malaise/fatigue. Negative for fever.   HENT: Negative.    Eyes: Negative.  Negative for blurred vision.   Respiratory: Negative.  Negative for cough.    Cardiovascular: Negative.  Negative for chest pain.   Gastrointestinal: Negative.  Negative for heartburn.   Genitourinary: Negative.  Negative for dysuria.   Musculoskeletal: Positive for myalgias and joint pain. Negative for neck pain.   Skin: Negative.  Negative for rash.   Neurological: Positive for weakness. Negative for dizziness and headaches.   Endo/Heme/Allergies: Negative.  Does not bruise/bleed easily.   Psychiatric/Behavioral: Negative.  Negative for depression.       Physical Exam:  Physical Exam  A&Ox1/2  PERRL EOMI mmm  Supple no jvd bruit or brenda  RRR no mrg  CTAB RAMIRO  Soft abd ntnd bs+  No edema      Labs:        Invalid input(s): [id number]      Recent Labs      05/03/17 1836  05/04/17   0301   SODIUM  140  135   POTASSIUM  3.7  3.8   CHLORIDE  105  103   CO2  26  26   BUN  17  16   CREATININE  0.89  0.82   CALCIUM  9.1  8.9     Recent Labs      05/03/17 1836 05/04/17   0301   ALTSGPT  17   --    ASTSGOT  20   --    ALKPHOSPHAT  61   --    TBILIRUBIN  0.5   --    LIPASE  25   --    GLUCOSE  105*  139*     Recent Labs      05/03/17 1836 05/04/17   0301   RBC  3.92*  3.66*   HEMOGLOBIN  11.1*  10.3*   HEMATOCRIT  34.8*  31.9*   PLATELETCT  165  163*   PROTHROMBTM  14.3   --    APTT  31.9   --    INR  1.08   --      Recent Labs      05/03/17 1836 05/04/17   0301   WBC  7.1  8.2   NEUTSPOLYS  70.90  61.70   LYMPHOCYTES  23.70  29.30   MONOCYTES  2.70  7.20   EOSINOPHILS  0.90  0.40   BASOPHILS  0.90  0.50   ASTSGOT  20   --    ALTSGPT  17   --     ALKPHOSPHAT  61   --    TBILIRUBIN  0.5   --            Hemodynamics:  Temp (24hrs), Av.8 °C (98.3 °F), Min:36.5 °C (97.7 °F), Max:37.3 °C (99.2 °F)  Temperature: 36.5 °C (97.7 °F)  Pulse  Av  Min: 70  Max: 102Heart Rate (Monitored): 96  Blood Pressure : 142/67 mmHg, NIBP: (!) 186/78 mmHg     Respiratory:    Respiration: 16, Pulse Oximetry: 98 %           Fluids:  No intake or output data in the 24 hours ending 17 1258  Weight: 71.4 kg (157 lb 6.5 oz)  GI/Nutrition:  Orders Placed This Encounter   Procedures   • Diet Order     Standing Status: Standing      Number of Occurrences: 1      Standing Expiration Date:      Order Specific Question:  Diet:     Answer:  Regular [1]     Medical Decision Making, by Problem:  Active Hospital Problems    Diagnosis   • *Left hip pain [M25.552] - MRI pending to r/o occult fx   • CKD (chronic kidney disease), stage III [N18.3] - watch volume   • Sick sinus syndrome (CMS-HCC) [I49.5] - pradaxa, digoxin   • Chronic respiratory failure with hypoxia (CMS-HCC) [J96.11]   • HTN (hypertension), benign [I10]   • Dyslipidemia [E78.5] - statin   • Afib - pradaxa, digoxin   • Hemorrhagic disorder due to extrinsic circulating anticoagulants (CMS-HCC) [D68.32]   • Late onset Alzheimer's disease without behavioral disturbance [G30.1, F02.80] - namenda   • Gastroesophageal reflux disease without esophagitis [K21.9] - PPI   • Hx UTI - check urine       EKG reviewed, Labs reviewed, Medications reviewed and Radiology images reviewed  Gutiérrez catheter: No Gutiérrez          Ulcer prophylaxis: Yes

## 2017-05-05 ENCOUNTER — APPOINTMENT (OUTPATIENT)
Dept: RADIOLOGY | Facility: MEDICAL CENTER | Age: 82
DRG: 536 | End: 2017-05-05
Attending: INTERNAL MEDICINE
Payer: MEDICARE

## 2017-05-05 PROCEDURE — 700111 HCHG RX REV CODE 636 W/ 250 OVERRIDE (IP): Performed by: INTERNAL MEDICINE

## 2017-05-05 PROCEDURE — 99226 PR SUBSEQUENT OBSERVATION CARE,LEVEL III: CPT | Performed by: INTERNAL MEDICINE

## 2017-05-05 PROCEDURE — A9270 NON-COVERED ITEM OR SERVICE: HCPCS | Performed by: INTERNAL MEDICINE

## 2017-05-05 PROCEDURE — 73721 MRI JNT OF LWR EXTRE W/O DYE: CPT | Mod: LT

## 2017-05-05 PROCEDURE — G0378 HOSPITAL OBSERVATION PER HR: HCPCS

## 2017-05-05 PROCEDURE — 700102 HCHG RX REV CODE 250 W/ 637 OVERRIDE(OP): Performed by: INTERNAL MEDICINE

## 2017-05-05 RX ORDER — ASPIRIN 325 MG
325 TABLET ORAL EVERY MORNING
Status: ON HOLD | COMMUNITY
End: 2017-05-16

## 2017-05-05 RX ORDER — OXYBUTYNIN CHLORIDE 5 MG/1
5 TABLET, EXTENDED RELEASE ORAL EVERY MORNING
Status: ON HOLD | COMMUNITY
End: 2017-05-16

## 2017-05-05 RX ADMIN — OXYBUTYNIN CHLORIDE 10 MG: 10 TABLET, FILM COATED, EXTENDED RELEASE ORAL at 08:54

## 2017-05-05 RX ADMIN — LORAZEPAM 1 MG: 2 INJECTION INTRAMUSCULAR; INTRAVENOUS at 12:23

## 2017-05-05 RX ADMIN — OMEPRAZOLE 20 MG: 20 CAPSULE, DELAYED RELEASE ORAL at 08:54

## 2017-05-05 RX ADMIN — STANDARDIZED SENNA CONCENTRATE AND DOCUSATE SODIUM 2 TABLET: 8.6; 5 TABLET, FILM COATED ORAL at 08:55

## 2017-05-05 RX ADMIN — MORPHINE SULFATE 2 MG: 4 INJECTION INTRAVENOUS at 22:14

## 2017-05-05 RX ADMIN — OXYCODONE HYDROCHLORIDE AND ACETAMINOPHEN 500 MG: 500 TABLET ORAL at 08:54

## 2017-05-05 RX ADMIN — STANDARDIZED SENNA CONCENTRATE AND DOCUSATE SODIUM 2 TABLET: 8.6; 5 TABLET, FILM COATED ORAL at 22:06

## 2017-05-05 RX ADMIN — ROSUVASTATIN CALCIUM 125 MCG: 10 TABLET, FILM COATED ORAL at 18:34

## 2017-05-05 RX ADMIN — MORPHINE SULFATE 2 MG: 4 INJECTION INTRAVENOUS at 12:23

## 2017-05-05 RX ADMIN — MEMANTINE HYDROCHLORIDE 10 MG: 10 TABLET ORAL at 08:54

## 2017-05-05 RX ADMIN — DABIGATRAN ETEXILATE MESYLATE 75 MG: 75 CAPSULE ORAL at 08:54

## 2017-05-05 RX ADMIN — DABIGATRAN ETEXILATE MESYLATE 75 MG: 75 CAPSULE ORAL at 22:06

## 2017-05-05 ASSESSMENT — ENCOUNTER SYMPTOMS
MYALGIAS: 1
GASTROINTESTINAL NEGATIVE: 1
PSYCHIATRIC NEGATIVE: 1
WEAKNESS: 1
RESPIRATORY NEGATIVE: 1
NECK PAIN: 0
FEVER: 0
BLURRED VISION: 0
HEARTBURN: 0
EYES NEGATIVE: 1
CARDIOVASCULAR NEGATIVE: 1
BRUISES/BLEEDS EASILY: 0
DIZZINESS: 0
HEADACHES: 0
DEPRESSION: 0
COUGH: 0

## 2017-05-05 ASSESSMENT — PAIN SCALES - GENERAL
PAINLEVEL_OUTOF10: ASSUMED PAIN PRESENT

## 2017-05-05 NOTE — PROGRESS NOTES
Received report on pt, daughter Isabel at bedside, pt oriented to self, 2L nasal cannula, unable to obtain pain scale rating from pt, bed alarm on and intact.

## 2017-05-05 NOTE — CARE PLAN
Problem: Communication  Goal: The ability to communicate needs accurately and effectively will improve  Outcome: PROGRESSING SLOWER THAN EXPECTED  Educated patient on plan of care. Updated white board. All questions answered for daughter Isabel. Attempts made to reorient pt.    Problem: Safety  Goal: Will remain free from falls  Outcome: PROGRESSING AS EXPECTED  Pt remained free from falls during shift. Pt oriented to call light, bed in low position and wheels locked. Pt encouraged to call for assistance. Bed alarm on and intact.

## 2017-05-05 NOTE — THERAPY
Attempted to see in the am but in procedure and pm too fatigued to wake for OT.  Will attempt in the future.  MW [phone]

## 2017-05-05 NOTE — CARE PLAN
Problem: Pain Management  Goal: Pain level will decrease to patient’s comfort goal  Outcome: PROGRESSING SLOWER THAN EXPECTED  Pain minimally controlled with PRNs, pain increasing with turning/repositioning.     Problem: Mobility  Goal: Risk for activity intolerance will decrease  Outcome: PROGRESSING AS EXPECTED  Patient unable to mobilize due to extreme pain. Repositioned with 2 person assist.

## 2017-05-05 NOTE — DISCHARGE PLANNING
Care Transition Team Assessment      Patient is from [place], the patient's daughter would like her go back when medically cleared.     Information Source  Orientation : Disoriented to Event, Disoriented to Place, Disoriented to Time  Information Given By: Patient  Informant's Name:  (Isabel Hyatt)  Who is responsible for making decisions for patient? : Adult child  Name(s) of Primary Decision Maker:  (Isabeljesus Patel)    Readmission Evaluation  Is this a readmission?: No    Elopement Risk  Legal Hold: No  Ambulatory or Self Mobile in Wheelchair: No-Not an Elopement Risk  Elopement Risk: Not at Risk for Elopement    Interdisciplinary Discharge Planning  Does Admitting Nurse Feel This Could be a Complex Discharge?: No  Primary Care Physician: Rafa  Lives with - Patient's Self Care Capacity: Attendant / Paid Care Giver  Patient or legal guardian wants to designate a caregiver (see row info): No  Support Systems: Family Member(s)  Housing / Facility: Long Term Care Facility  Do You Take your Prescribed Medications Regularly: Yes  Able to Return to Previous ADL's: Future Time w/Therapy  Mobility Issues: Yes  Prior Services: Continuous (24 Hour) Care Giving Per Service  Assistance Needed: Yes  Durable Medical Equipment: Unknown    Discharge Preparedness  What is your plan after discharge?: Skilled nursing facility  What are your discharge supports?: Child  Prior Functional Level: Needs Assist with Activities of Daily Living, Needs Assist with Medication Management, Uses Walker  Difficulity with ADLs: Toileting, Walking  Difficulty with ADLs Comment:  (In skilled )  Difficulity with IADLs: Cooking, Driving, Keeping track of finances, Laundry, Managing medication, Shopping, Using the telephone or computer  Difficulity with IADL Comments:  (In skilled)    Functional Assesment  Prior Functional Level: Needs Assist with Activities of Daily Living, Needs Assist with Medication Management, Uses Walker    Finances  Financial Barriers  to Discharge: No  Prescription Coverage: Yes    Vision / Hearing Impairment  Vision Impairment : Yes  Right Eye Vision: Impaired, Wears Glasses  Left Eye Vision: Impaired, Wears Glasses  Hearing Impairment : Yes  Hearing Impairment: Both Ears  Does Pt Need Special Equipment for the Hearing Impaired?: No    Values / Beliefs / Concerns  Values / Beliefs Concerns : No    Advance Directive  Advance Directive?: None  Advance Directive offered?: AD Booklet refused    Domestic Abuse  Have you ever been the victim of abuse or violence?: No  Physical Abuse or Sexual Abuse: No  Verbal Abuse or Emotional Abuse: No  Possible Abuse Reported to:: Not Applicable    Psychological Assessment  History of Substance Abuse: None  History of Psychiatric Problems: No  Non-compliant with Treatment: No  Newly Diagnosed Illness: No    Discharge Risks or Barriers  Discharge risks or barriers?: No    Anticipated Discharge Information  Anticipated discharge disposition: SNF  Discharge Address:  ([place])  Discharge Contact Phone Number:  ([phone])

## 2017-05-05 NOTE — PROGRESS NOTES
Med rec updated and [facility]  Allergies reviewed  No recent ABX noted on MAR  Pt has been admitted at this facility since 4/12/17

## 2017-05-05 NOTE — PROGRESS NOTES
Patient alert only to self.   Persistent L hip pain, with minimal control with PRN meds.   Awaiting MRI, patient will need premedication.   2 person assist with turning/repositioning.   Daughter at bedside, updated on plan of care.

## 2017-05-05 NOTE — PROGRESS NOTES
Hospital Medicine Progress Note, Adult, Complex               Author: Lucas Garcia Date & Time created: 5/5/2017  12:08 PM     Interval History:  Still awaiting MRI.  Rather poor historian, requires assist with feeding.    Review of Systems:  Review of Systems   Constitutional: Positive for malaise/fatigue. Negative for fever.   HENT: Negative.    Eyes: Negative.  Negative for blurred vision.   Respiratory: Negative.  Negative for cough.    Cardiovascular: Negative.  Negative for chest pain.   Gastrointestinal: Negative.  Negative for heartburn.   Genitourinary: Negative.  Negative for dysuria.   Musculoskeletal: Positive for myalgias and joint pain. Negative for neck pain.   Skin: Negative.  Negative for rash.   Neurological: Positive for weakness. Negative for dizziness and headaches.   Endo/Heme/Allergies: Negative.  Does not bruise/bleed easily.   Psychiatric/Behavioral: Negative.  Negative for depression.       Physical Exam:  Physical Exam  A&Ox1/2  PERRL EOMI mmm  Supple no jvd bruit or brenda  RRR no mrg  CTAB RAMIRO  Soft abd ntnd bs+  No edema      Labs:        Invalid input(s): [id number]      Recent Labs      05/03/17   1836  05/04/17   0301   SODIUM  140  135   POTASSIUM  3.7  3.8   CHLORIDE  105  103   CO2  26  26   BUN  17  16   CREATININE  0.89  0.82   CALCIUM  9.1  8.9     Recent Labs      05/03/17   1836  05/04/17   0301   ALTSGPT  17   --    ASTSGOT  20   --    ALKPHOSPHAT  61   --    TBILIRUBIN  0.5   --    LIPASE  25   --    GLUCOSE  105*  139*     Recent Labs      05/03/17 1836 05/04/17   0301   RBC  3.92*  3.66*   HEMOGLOBIN  11.1*  10.3*   HEMATOCRIT  34.8*  31.9*   PLATELETCT  165  163*   PROTHROMBTM  14.3   --    APTT  31.9   --    INR  1.08   --      Recent Labs      05/03/17 1836  05/04/17   0301   WBC  7.1  8.2   NEUTSPOLYS  70.90  61.70   LYMPHOCYTES  23.70  29.30   MONOCYTES  2.70  7.20   EOSINOPHILS  0.90  0.40   BASOPHILS  0.90  0.50   ASTSGOT  20   --    ALTSGPT  17   --     ALKPHOSPHAT  61   --    TBILIRUBIN  0.5   --            Hemodynamics:  Temp (24hrs), Av.1 °C (98.7 °F), Min:36.3 °C (97.4 °F), Max:37.7 °C (99.8 °F)  Temperature: 37.2 °C (98.9 °F)  Pulse  Av.9  Min: 69  Max: 102   Blood Pressure : 125/95 mmHg     Respiratory:    Respiration: 16, Pulse Oximetry: 97 %        RUL Breath Sounds: Diminished, RML Breath Sounds: Diminished, RLL Breath Sounds: Diminished, SAVAGE Breath Sounds: Diminished, LLL Breath Sounds: Diminished  Fluids:    Intake/Output Summary (Last 24 hours) at 17 1208  Last data filed at 17 0500   Gross per 24 hour   Intake    250 ml   Output      0 ml   Net    250 ml        GI/Nutrition:  Orders Placed This Encounter   Procedures   • Diet Order     Standing Status: Standing      Number of Occurrences: 1      Standing Expiration Date:      Order Specific Question:  Diet:     Answer:  Regular [1]     Medical Decision Making, by Problem:  Active Hospital Problems    Diagnosis   • *Left hip pain [M25.552] - MRI pending to r/o occult fx   • CKD (chronic kidney disease), stage III [N18.3] - watch volume   • Sick sinus syndrome (CMS-HCC) [I49.5] - pradaxa, digoxin   • Chronic respiratory failure with hypoxia (CMS-HCC) [J96.11]   • HTN (hypertension), benign [I10]   • Dyslipidemia [E78.5] - statin   • Afib - pradaxa, digoxin   • Hemorrhagic disorder due to extrinsic circulating anticoagulants (CMS-HCC) [D68.32]   • Late onset Alzheimer's disease without behavioral disturbance [G30.1, F02.80] - namenda   • Gastroesophageal reflux disease without esophagitis [K21.9] - PPI   • Hx UTI - check urine       EKG reviewed, Labs reviewed, Medications reviewed and Radiology images reviewed  Gutiérrez catheter: No Gutiérrez          Ulcer prophylaxis: Yes

## 2017-05-06 PROCEDURE — 99226 PR SUBSEQUENT OBSERVATION CARE,LEVEL III: CPT | Performed by: INTERNAL MEDICINE

## 2017-05-06 PROCEDURE — 770006 HCHG ROOM/CARE - MED/SURG/GYN SEMI*

## 2017-05-06 PROCEDURE — 700102 HCHG RX REV CODE 250 W/ 637 OVERRIDE(OP): Performed by: INTERNAL MEDICINE

## 2017-05-06 PROCEDURE — A9270 NON-COVERED ITEM OR SERVICE: HCPCS | Performed by: INTERNAL MEDICINE

## 2017-05-06 RX ORDER — SULFAMETHOXAZOLE AND TRIMETHOPRIM 800; 160 MG/1; MG/1
1 TABLET ORAL EVERY 12 HOURS
Status: DISCONTINUED | OUTPATIENT
Start: 2017-05-06 | End: 2017-05-08 | Stop reason: HOSPADM

## 2017-05-06 RX ADMIN — MEMANTINE HYDROCHLORIDE 10 MG: 10 TABLET ORAL at 09:12

## 2017-05-06 RX ADMIN — OMEPRAZOLE 20 MG: 20 CAPSULE, DELAYED RELEASE ORAL at 09:12

## 2017-05-06 RX ADMIN — STANDARDIZED SENNA CONCENTRATE AND DOCUSATE SODIUM 2 TABLET: 8.6; 5 TABLET, FILM COATED ORAL at 21:03

## 2017-05-06 RX ADMIN — SULFAMETHOXAZOLE AND TRIMETHOPRIM 1 TABLET: 800; 160 TABLET ORAL at 21:03

## 2017-05-06 RX ADMIN — ASPIRIN 81 MG: 81 TABLET ORAL at 12:20

## 2017-05-06 RX ADMIN — OXYBUTYNIN CHLORIDE 10 MG: 10 TABLET, FILM COATED, EXTENDED RELEASE ORAL at 09:12

## 2017-05-06 RX ADMIN — SULFAMETHOXAZOLE AND TRIMETHOPRIM 1 TABLET: 800; 160 TABLET ORAL at 12:19

## 2017-05-06 RX ADMIN — OXYCODONE HYDROCHLORIDE AND ACETAMINOPHEN 500 MG: 500 TABLET ORAL at 09:11

## 2017-05-06 RX ADMIN — DABIGATRAN ETEXILATE MESYLATE 75 MG: 75 CAPSULE ORAL at 09:11

## 2017-05-06 RX ADMIN — ROSUVASTATIN CALCIUM 125 MCG: 10 TABLET, FILM COATED ORAL at 17:02

## 2017-05-06 RX ADMIN — ACETAMINOPHEN 650 MG: 325 TABLET, FILM COATED ORAL at 15:54

## 2017-05-06 RX ADMIN — STANDARDIZED SENNA CONCENTRATE AND DOCUSATE SODIUM 2 TABLET: 8.6; 5 TABLET, FILM COATED ORAL at 09:13

## 2017-05-06 ASSESSMENT — ENCOUNTER SYMPTOMS
PSYCHIATRIC NEGATIVE: 1
DEPRESSION: 0
COUGH: 0
FEVER: 0
GASTROINTESTINAL NEGATIVE: 1
HEARTBURN: 0
RESPIRATORY NEGATIVE: 1
HEADACHES: 0
BRUISES/BLEEDS EASILY: 0
EYES NEGATIVE: 1
BLURRED VISION: 0
NECK PAIN: 0
DIZZINESS: 0
MYALGIAS: 1
WEAKNESS: 1
CARDIOVASCULAR NEGATIVE: 1

## 2017-05-06 ASSESSMENT — PAIN SCALES - GENERAL: PAINLEVEL_OUTOF10: 0

## 2017-05-06 NOTE — PROGRESS NOTES
Received report and assumed care of the patient. Patient is oriented to self only. Bed alarm on. Bed in low and locked position. Call light and belongings within reach. Treaded socks on. Patient cries out every time she is moved, despite receiving pain medications. Once she has stopped moving she appears comfortable. Turn and reposition every two hours. Hourly rounding in place.

## 2017-05-06 NOTE — PROGRESS NOTES
Patient alert, oriented to first name only.   MRI/hip (L) today, see results.   PT/OT on hold.   2 person assist to turn.   PRN pain meds effective.   Bed alarm on for safety, patient not impulsive.

## 2017-05-06 NOTE — PROGRESS NOTES
Hospital Medicine Progress Note, Adult, Complex               Author: Lucas Garcia Date & Time created: 5/6/2017  12:05 PM     Interval History:  Long talk with family.  Will stop pradaxa, ASA only for afib prophylaxis.  Will have ortho look at MRI.  They are not interested in surgery, but will consider if recommended by ortho for QOL standpoint.  Urine shows many bacteria, will empirically treat with bactrim.    Review of Systems:  Review of Systems   Constitutional: Positive for malaise/fatigue. Negative for fever.   HENT: Negative.    Eyes: Negative.  Negative for blurred vision.   Respiratory: Negative.  Negative for cough.    Cardiovascular: Negative.  Negative for chest pain.   Gastrointestinal: Negative.  Negative for heartburn.   Genitourinary: Negative.  Negative for dysuria.   Musculoskeletal: Positive for myalgias and joint pain. Negative for neck pain.   Skin: Negative.  Negative for rash.   Neurological: Positive for weakness. Negative for dizziness and headaches.   Endo/Heme/Allergies: Negative.  Does not bruise/bleed easily.   Psychiatric/Behavioral: Negative.  Negative for depression.       Physical Exam:  Physical Exam  A&Ox1/2  PERRL EOMI mmm  Supple no jvd bruit or brenda  RRR no mrg  CTAB RAMIRO  Soft abd ntnd bs+  No edema      Labs:        Invalid input(s): [id number]      Recent Labs      05/03/17   1836  05/04/17   0301   SODIUM  140  135   POTASSIUM  3.7  3.8   CHLORIDE  105  103   CO2  26  26   BUN  17  16   CREATININE  0.89  0.82   CALCIUM  9.1  8.9     Recent Labs      05/03/17   1836  05/04/17   0301   ALTSGPT  17   --    ASTSGOT  20   --    ALKPHOSPHAT  61   --    TBILIRUBIN  0.5   --    LIPASE  25   --    GLUCOSE  105*  139*     Recent Labs      05/03/17   1836  05/04/17   0301   RBC  3.92*  3.66*   HEMOGLOBIN  11.1*  10.3*   HEMATOCRIT  34.8*  31.9*   PLATELETCT  165  163*   PROTHROMBTM  14.3   --    APTT  31.9   --    INR  1.08   --      Recent Labs      05/03/17   1836  05/04/17    0301   WBC  7.1  8.2   NEUTSPOLYS  70.90  61.70   LYMPHOCYTES  23.70  29.30   MONOCYTES  2.70  7.20   EOSINOPHILS  0.90  0.40   BASOPHILS  0.90  0.50   ASTSGOT  20   --    ALTSGPT  17   --    ALKPHOSPHAT  61   --    TBILIRUBIN  0.5   --            Hemodynamics:  Temp (24hrs), Av.2 °C (98.9 °F), Min:36.9 °C (98.4 °F), Max:37.4 °C (99.3 °F)  Temperature: 36.9 °C (98.4 °F)  Pulse  Av.4  Min: 69  Max: 102   Blood Pressure : 120/65 mmHg     Respiratory:    Respiration: (!) 22 (rn notified), Pulse Oximetry: 97 %        RUL Breath Sounds: Diminished, RML Breath Sounds: Diminished, RLL Breath Sounds: Diminished, SAVAGE Breath Sounds: Diminished, LLL Breath Sounds: Diminished  Fluids:  No intake or output data in the 24 hours ending 17 1205     GI/Nutrition:  Orders Placed This Encounter   Procedures   • Diet Order     Standing Status: Standing      Number of Occurrences: 1      Standing Expiration Date:      Order Specific Question:  Diet:     Answer:  Regular [1]     Medical Decision Making, by Problem:  Active Hospital Problems    Diagnosis   • *Left hip pain [M25.552] - MRI pending to r/o occult fx   • CKD (chronic kidney disease), stage III [N18.3] - watch volume   • Sick sinus syndrome (CMS-HCC) [I49.5] - pradaxa, digoxin   • Chronic respiratory failure with hypoxia (CMS-[facility]) [J96.11]   • HTN (hypertension), benign [I10]   • Dyslipidemia [E78.5] - statin   • Afib - stop pradaxa, digoxin, add ASA   • Hemorrhagic disorder due to extrinsic circulating anticoagulants (CMS-HCC) [D68.32]   • Late onset Alzheimer's disease without behavioral disturbance [G30.1, F02.80] - namenda   • Gastroesophageal reflux disease without esophagitis [K21.9] - PPI   • UTI - start bactrim       EKG reviewed, Labs reviewed, Medications reviewed and Radiology images reviewed  Gutiérrez catheter: No Gutiérrez          Ulcer prophylaxis: Yes

## 2017-05-07 PROCEDURE — 99232 SBSQ HOSP IP/OBS MODERATE 35: CPT | Performed by: INTERNAL MEDICINE

## 2017-05-07 PROCEDURE — G8978 MOBILITY CURRENT STATUS: HCPCS | Mod: CN

## 2017-05-07 PROCEDURE — A9270 NON-COVERED ITEM OR SERVICE: HCPCS | Performed by: INTERNAL MEDICINE

## 2017-05-07 PROCEDURE — 700102 HCHG RX REV CODE 250 W/ 637 OVERRIDE(OP): Performed by: INTERNAL MEDICINE

## 2017-05-07 PROCEDURE — 97162 PT EVAL MOD COMPLEX 30 MIN: CPT

## 2017-05-07 PROCEDURE — 770006 HCHG ROOM/CARE - MED/SURG/GYN SEMI*

## 2017-05-07 PROCEDURE — G8979 MOBILITY GOAL STATUS: HCPCS | Mod: CJ

## 2017-05-07 RX ORDER — OXYCODONE HCL 10 MG/1
10 TABLET, FILM COATED, EXTENDED RELEASE ORAL EVERY 12 HOURS
Status: DISCONTINUED | OUTPATIENT
Start: 2017-05-07 | End: 2017-05-08 | Stop reason: HOSPADM

## 2017-05-07 RX ADMIN — MEMANTINE HYDROCHLORIDE 10 MG: 10 TABLET ORAL at 08:47

## 2017-05-07 RX ADMIN — ROSUVASTATIN CALCIUM 125 MCG: 10 TABLET, FILM COATED ORAL at 17:22

## 2017-05-07 RX ADMIN — ASPIRIN 81 MG: 81 TABLET ORAL at 08:44

## 2017-05-07 RX ADMIN — SULFAMETHOXAZOLE AND TRIMETHOPRIM 1 TABLET: 800; 160 TABLET ORAL at 08:47

## 2017-05-07 RX ADMIN — OXYCODONE HYDROCHLORIDE 5 MG: 5 TABLET ORAL at 08:45

## 2017-05-07 RX ADMIN — OXYBUTYNIN CHLORIDE 10 MG: 10 TABLET, FILM COATED, EXTENDED RELEASE ORAL at 08:48

## 2017-05-07 RX ADMIN — OXYCODONE HYDROCHLORIDE 10 MG: 10 TABLET, FILM COATED, EXTENDED RELEASE ORAL at 20:25

## 2017-05-07 RX ADMIN — OXYCODONE HYDROCHLORIDE AND ACETAMINOPHEN 500 MG: 500 TABLET ORAL at 08:45

## 2017-05-07 RX ADMIN — STANDARDIZED SENNA CONCENTRATE AND DOCUSATE SODIUM 2 TABLET: 8.6; 5 TABLET, FILM COATED ORAL at 20:25

## 2017-05-07 RX ADMIN — SULFAMETHOXAZOLE AND TRIMETHOPRIM 1 TABLET: 800; 160 TABLET ORAL at 20:25

## 2017-05-07 RX ADMIN — STANDARDIZED SENNA CONCENTRATE AND DOCUSATE SODIUM 2 TABLET: 8.6; 5 TABLET, FILM COATED ORAL at 08:47

## 2017-05-07 RX ADMIN — OMEPRAZOLE 20 MG: 20 CAPSULE, DELAYED RELEASE ORAL at 08:47

## 2017-05-07 RX ADMIN — OXYCODONE HYDROCHLORIDE 10 MG: 10 TABLET, FILM COATED, EXTENDED RELEASE ORAL at 14:12

## 2017-05-07 ASSESSMENT — ENCOUNTER SYMPTOMS
DIZZINESS: 0
BLURRED VISION: 0
BRUISES/BLEEDS EASILY: 0
GASTROINTESTINAL NEGATIVE: 1
PSYCHIATRIC NEGATIVE: 1
HEARTBURN: 0
DEPRESSION: 0
NECK PAIN: 0
MYALGIAS: 1
WEAKNESS: 1
HEADACHES: 0
FEVER: 0
RESPIRATORY NEGATIVE: 1
EYES NEGATIVE: 1
CARDIOVASCULAR NEGATIVE: 1
COUGH: 0

## 2017-05-07 ASSESSMENT — PAIN SCALES - GENERAL
PAINLEVEL_OUTOF10: 2
PAINLEVEL_OUTOF10: 2
PAINLEVEL_OUTOF10: ASSUMED PAIN PRESENT
PAINLEVEL_OUTOF10: 3
PAINLEVEL_OUTOF10: 7

## 2017-05-07 ASSESSMENT — PAIN SCALES - WONG BAKER
WONGBAKER_NUMERICALRESPONSE: HURTS JUST A LITTLE BIT
WONGBAKER_NUMERICALRESPONSE: HURTS JUST A LITTLE BIT

## 2017-05-07 ASSESSMENT — GAIT ASSESSMENTS: GAIT LEVEL OF ASSIST: UNABLE TO PARTICIPATE

## 2017-05-07 NOTE — CARE PLAN
Problem: Urinary Elimination:  Goal: Ability to reestablish a normal urinary elimination pattern will improve  Outcome: PROGRESSING AS EXPECTED  Pt is incontinent, perineal care and linen changes provided.     Problem: Respiratory:  Goal: Respiratory status will improve  Outcome: PROGRESSING AS EXPECTED  Oxygen therapy in use.

## 2017-05-07 NOTE — PROGRESS NOTES
Pt laying supine in bed. Assisted with slowly raising head of bed due to pain to sit up for medications and breakfast. Takes pills floated in applesauce with no problems, drinks copious amounts of water when offered drink. She is in pain and reports she hurts, administered oxycodone oral tablet as ordered to cover discomfort and improve repositioning activities. Spoke with daughter on phone, she wishes that pt have regular pain medications given for comfort because the pt will often refuse or not request when she is hurting badly and daughter wants her to be comfortable. Does not want pt to do surgery at all. Spoke with Hospitalist RN about scheduling pain medicine for pt. Assessment completed. No s/s distress noted. Assisted with eating and telling pt where things were located on her tray due to blindness. CNA sat 1:1 with pt for breakfast.

## 2017-05-07 NOTE — PROGRESS NOTES
Assumed care of pt at shift change, report received from day shift RN. Pt A&Ox1, self only. Denies pain while resting but pain present with movement. No other s/s of discomfort. Scheduled medications floated on apple sauce. On 1L O2 via NC. Assistance with repositioning provided. Bed in low position, alarm on, call light within reach, hourly rounding in place.

## 2017-05-07 NOTE — CARE PLAN
Problem: Pain Management  Goal: Pain level will decrease to patient’s comfort goal  Intervention: Follow pain managment plan developed in collaboration with patient and Interdisciplinary Team   Administered

## 2017-05-07 NOTE — CARE PLAN
Problem: Skin Integrity  Goal: Risk for impaired skin integrity will decrease  Intervention: Implement precautions to protect skin integrity in collaboration with the interdisciplinary team  Turn 2q hours, float saccrum using pillows to prevent skin breakdown on coccyx. Medicated for pain to facilitate turning activities.

## 2017-05-07 NOTE — THERAPY
"Physical Therapy Evaluation completed.   Bed Mobility:  Supine to Sit: Maximal Assist (x1 to EOB - minimal participation)  Transfers: Sit to Stand: Unable to Participate  Gait: Level Of Assist: Unable to Participate with No Equipment Needed       Plan of Care: Will benefit from Physical Therapy 3 times per week  Discharge Recommendations: Equipment: Front-Wheel Walker. Post-acute therapy Discharge to a transitional care facility for continued skilled therapy services.    See \"Rehab Therapy-Acute\" Patient Summary Report for complete documentation.   pt presents w/ weakness, decreased activity tolerance, pain , muscle guarding, axiety, anticipation of pain, decreased weight shifting and decreased cognition limiting functional mobility. Pt will benefit from skilled acute PT services to address deficits. performed hip abd/add, hip flex/ext, knee flex/ext, hip IR/er, and AP w/ PROM w/ slightest movement which leads to sever pelvis pain. pt anticipating pain and grimacing prior to PT starting to move pt w ROM. able to pivot to EOB w/ max A then sit for 5 min w/ leaning on PT's hand or on railing then max A x2 BTB. pt left in bed after session. Rn notified of session and mobility. Pt will need post acute placement for further therapy prior to d/c home.   "

## 2017-05-07 NOTE — PROGRESS NOTES
Hospital Medicine Progress Note, Adult, Complex               Author: Lucas Garcia Date & Time created: 2017  11:23 AM     Interval History:  Awaiting ortho consult, ultimately will return to SNF. Family not interested in surgery, but will consider if recommended by ortho for QOL standpoint.  Tolerating bactrim, awaiting cx.    Review of Systems:  Review of Systems   Constitutional: Positive for malaise/fatigue. Negative for fever.   HENT: Negative.    Eyes: Negative.  Negative for blurred vision.   Respiratory: Negative.  Negative for cough.    Cardiovascular: Negative.  Negative for chest pain.   Gastrointestinal: Negative.  Negative for heartburn.   Genitourinary: Negative.  Negative for dysuria.   Musculoskeletal: Positive for myalgias and joint pain. Negative for neck pain.   Skin: Negative.  Negative for rash.   Neurological: Positive for weakness. Negative for dizziness and headaches.   Endo/Heme/Allergies: Negative.  Does not bruise/bleed easily.   Psychiatric/Behavioral: Negative.  Negative for depression.       Physical Exam:  Physical Exam  A&Ox1/2  PERRL EOMI mmm  Supple no jvd bruit or brenda  RRR no mrg  CTAB RAMIRO  Soft abd ntnd bs+  No edema      Labs:        Invalid input(s): [id number]      No results for input(s): SODIUM, POTASSIUM, CHLORIDE, CO2, BUN, CREATININE, MAGNESIUM, PHOSPHORUS, CALCIUM in the last 72 hours.  No results for input(s): ALTSGPT, ASTSGOT, ALKPHOSPHAT, TBILIRUBIN, DBILIRUBIN, GAMMAGT, AMYLASE, LIPASE, ALB, PREALBUMIN, GLUCOSE in the last 72 hours.  No results for input(s): RBC, HEMOGLOBIN, HEMATOCRIT, PLATELETCT, PROTHROMBTM, APTT, INR, IRON, FERRITIN, TOTIRONBC in the last 72 hours.            Hemodynamics:  Temp (24hrs), Av.2 °C (99 °F), Min:36.8 °C (98.3 °F), Max:37.9 °C (100.3 °F)  Temperature: 37.1 °C (98.7 °F)  Pulse  Av.3  Min: 69  Max: 102   Blood Pressure : 147/69 mmHg     Respiratory:    Respiration: 18, Pulse Oximetry: 95 %        RUL Breath Sounds:  Diminished, RML Breath Sounds: Diminished, RLL Breath Sounds: Diminished, SAVAGE Breath Sounds: Diminished, LLL Breath Sounds: Diminished  Fluids:    Intake/Output Summary (Last 24 hours) at 05/07/17 1123  Last data filed at 05/07/17 0400   Gross per 24 hour   Intake      0 ml   Output      0 ml   Net      0 ml        GI/Nutrition:  Orders Placed This Encounter   Procedures   • Diet Order     Standing Status: Standing      Number of Occurrences: 1      Standing Expiration Date:      Order Specific Question:  Diet:     Answer:  Regular [1]     Medical Decision Making, by Problem:  Active Hospital Problems    Diagnosis   • *Left hip pain [M25.552] - MRI shows pelvic fx, ortho opinion pending Dr. Moran   • CKD (chronic kidney disease), stage III [N18.3] - watch volume   • Sick sinus syndrome (CMS-HCC) [I49.5] - ASA, digoxin   • Chronic respiratory failure with hypoxia (CMS-[facility]) [J96.11]   • HTN (hypertension), benign [I10]   • Dyslipidemia [E78.5] - statin   • Afib - stopped pradaxa, digoxin, ASA   • Hemorrhagic disorder due to extrinsic circulating anticoagulants (CMS-HCC) [D68.32]   • Late onset Alzheimer's disease without behavioral disturbance [G30.1, F02.80] - namenda   • Gastroesophageal reflux disease without esophagitis [K21.9] - PPI   • UTI - bactrim, follow cx       EKG reviewed, Labs reviewed, Medications reviewed and Radiology images reviewed  Gutiérrez catheter: No Gutiérrez          Ulcer prophylaxis: Yes

## 2017-05-07 NOTE — CONSULTS
Ortho:    Please see forthcoming dictation for details. In brief, Ms. Cruz is an 88F s/p GLF's and non-displaced pelvic insufficiency fractures. I recommend nonoperative treatment; WBAT BLE with a walker; PT for mobilization; F/u [facility] vs. Primary care doctor in 4 weeks. Okay to discharge from hospital when cleared by PT/hospitalist.

## 2017-05-08 ENCOUNTER — PATIENT OUTREACH (OUTPATIENT)
Dept: HEALTH INFORMATION MANAGEMENT | Facility: OTHER | Age: 82
End: 2017-05-08

## 2017-05-08 VITALS
SYSTOLIC BLOOD PRESSURE: 137 MMHG | DIASTOLIC BLOOD PRESSURE: 92 MMHG | HEIGHT: 62 IN | HEART RATE: 79 BPM | WEIGHT: 157.41 LBS | RESPIRATION RATE: 19 BRPM | OXYGEN SATURATION: 96 % | TEMPERATURE: 98.4 F | BODY MASS INDEX: 28.97 KG/M2

## 2017-05-08 LAB
BACTERIA UR CULT: NORMAL
SIGNIFICANT IND 70042: NORMAL
SITE SITE: NORMAL
SOURCE SOURCE: NORMAL

## 2017-05-08 PROCEDURE — 99239 HOSP IP/OBS DSCHRG MGMT >30: CPT | Performed by: INTERNAL MEDICINE

## 2017-05-08 PROCEDURE — A9270 NON-COVERED ITEM OR SERVICE: HCPCS | Performed by: INTERNAL MEDICINE

## 2017-05-08 PROCEDURE — 700102 HCHG RX REV CODE 250 W/ 637 OVERRIDE(OP): Performed by: INTERNAL MEDICINE

## 2017-05-08 RX ORDER — OXYCODONE HCL 10 MG/1
10 TABLET, FILM COATED, EXTENDED RELEASE ORAL EVERY 12 HOURS
Qty: 60 EACH | Status: ON HOLD
Start: 2017-05-08 | End: 2017-05-16

## 2017-05-08 RX ORDER — SULFAMETHOXAZOLE AND TRIMETHOPRIM 800; 160 MG/1; MG/1
1 TABLET ORAL EVERY 12 HOURS
Refills: 0
Start: 2017-05-08 | End: 2017-05-14

## 2017-05-08 RX ORDER — TRAMADOL HYDROCHLORIDE 50 MG/1
50 TABLET ORAL EVERY 6 HOURS PRN
Qty: 30 TAB | Refills: 0
Start: 2017-05-08 | End: 2017-05-14

## 2017-05-08 RX ORDER — OXYCODONE HYDROCHLORIDE 5 MG/1
5 TABLET ORAL EVERY 6 HOURS PRN
Status: ON HOLD
Start: 2017-05-08 | End: 2017-05-16

## 2017-05-08 RX ADMIN — OMEPRAZOLE 20 MG: 20 CAPSULE, DELAYED RELEASE ORAL at 08:12

## 2017-05-08 RX ADMIN — OXYBUTYNIN CHLORIDE 10 MG: 10 TABLET, FILM COATED, EXTENDED RELEASE ORAL at 08:12

## 2017-05-08 RX ADMIN — OXYCODONE HYDROCHLORIDE 10 MG: 10 TABLET, FILM COATED, EXTENDED RELEASE ORAL at 08:12

## 2017-05-08 RX ADMIN — SULFAMETHOXAZOLE AND TRIMETHOPRIM 1 TABLET: 800; 160 TABLET ORAL at 08:12

## 2017-05-08 RX ADMIN — STANDARDIZED SENNA CONCENTRATE AND DOCUSATE SODIUM 2 TABLET: 8.6; 5 TABLET, FILM COATED ORAL at 08:12

## 2017-05-08 RX ADMIN — MEMANTINE HYDROCHLORIDE 10 MG: 10 TABLET ORAL at 08:12

## 2017-05-08 RX ADMIN — ASPIRIN 81 MG: 81 TABLET ORAL at 08:12

## 2017-05-08 RX ADMIN — OXYCODONE HYDROCHLORIDE AND ACETAMINOPHEN 500 MG: 500 TABLET ORAL at 08:12

## 2017-05-08 ASSESSMENT — PAIN SCALES - GENERAL
PAINLEVEL_OUTOF10: 3
PAINLEVEL_OUTOF10: 6

## 2017-05-08 NOTE — DISCHARGE PLANNING
Medical Social Work  Faxed Choice to [facility], verbal given by patients daughter.    Pc from [place], they can take patient back today, but they will need a new referral.

## 2017-05-08 NOTE — DISCHARGE PLANNING
Medical Social Work  PC to patient's daughter; told her patient is m/c to go back to [place] today.  She gave me verbal permission to send her back.

## 2017-05-08 NOTE — CARE PLAN
Problem: Pain Management  Goal: Pain level will decrease to patient’s comfort goal  Pt reporting pain when moved, medicated with scheduled oxycontin at this time.     Problem: Communication  Goal: The ability to communicate needs accurately and effectively will improve  Intervention: Reorient patient to environment as needed  Pt oriented to self only at this time, reoriented as needed.

## 2017-05-08 NOTE — DISCHARGE SUMMARY
DATE OF ADMISSION:  05/03/2017    DATE OF DISCHARGE:   05/08/2017    FINAL DIAGNOSES:  1.  Pelvic fracture, deemed nonoperative by Dr. Kota Moran,   orthopedist.  2.  Fracture of left parasymphyseal moderately buckled acute fracture at 6 cm   adjacent hematoma within the left hemipelvis and nondisplaced anterior pubic   ramus fracture.  3.  Right superior and inferior pubic rami fractures, most likely subacute.  4.  Right zone 1 sacral fracture, most likely subacute.    5.  Left gluteus minimus greater than medius tendon tears.  6.  Advanced dementia, nursing home-bound.  7.  Weightbearing as tolerated.  8.  Stage III chronic kidney disease.  9.  Sick sinus syndrome and atrial fibrillation, no longer on Pradaxa per   discussion with the family given fall risk and advanced age and dementia, on   aspirin and digoxin.  10.  Acid reflux.  11.  Urinary tract infection.    HISTORY OF PRESENT ILLNESS:  An 88-year-old female known to me from prior   stays during the hospital at [facility].  She was transferred   to [facility] for long-term care and because her   granddaughter works at that facility.  At that facility, she sustained a fall.    She presents for severe pain and pelvis.    HOSPITAL COURSE:  The initial x-rays of the pelvis were negative.  However,   pain persisted.  She underwent MRI of the pelvis.  This shows left   parasymphyseal moderate buckled acute fractures,  a 6-cm adjacent hematoma in   the left hemipelvis, nondisplaced inferior pubic ramus fracture on the left as   well as right superior and inferior pubic rami fractures that were subacute.    There is also right zone 1 sacral fracture that appeared subacute as well as   left gluteus minimus and  medius tendon tears.  I had a long meeting with this   family.  Given her fall risk and dementia, Pradaxa has been discontinued in   favor of aspirin.  I spoke with Kota Moran, orthopedist, he consulted.     He felt that she does not require surgical intervention.  She is   weightbearing as tolerated.    DISCHARGE CONDITION:  Guarded, but stable for skilled nursing.    DISCHARGE DIET:  Heart healthy.    DISCHARGE ACTIVITY:  Weightbearing as tolerated.    DISCHARGE MEDICATIONS:  1.  Vitamin C 500 mg daily.  2.  Enteric-coated aspirin 325 mg daily.  3.  Digoxin 125 mg daily.  4.  Namenda extended release 28 mg daily.  5.  Prilosec 20 mg daily.  6.  Ditropan 5 mg daily.  7.  Oxycodone 5 mg every 6 hours as needed for severe pain.  8.  OxyContin 10 mg twice daily, to be weaned as the pelvic fractures,   resolved.  9.  Bactrim double strength twice daily for 3 additional days.  10.  Ultram 50 mg every 6 hours as needed for her severe pain.    LABORATORY DATA:  Urinalysis, 10-20 whites per high powered field, many   bacteria.  Urine cultures negative.  This was drawn late.  Electrolytes, blood   counts within normal limits with exception of hemoglobin of 10.    STUDIES:  Chest x-ray negative.  MR is as described.  Hip films are negative.    Pelvic films also negative.    CONSULTS:  Kota Moran MD, orthopedist.    PROCEDURES:  None.    FOLLOWUP:  She will be transferred back to [facility]   under the care of Dr. Lu.  She is weightbearing as tolerated.  Pradaxa has   been discontinued due to fall risk and lack of benefit in a demented elderly   female.    Discharge time was 35 minutes.       ____________________________________     MD LIGIA HOOVER / DUDLEY    DD:  05/08/2017 12:08:43  DT:  05/08/2017 15:05:40    D#:  [id number]  Job#:  [id number]    cc: MITCHEL LU MD

## 2017-05-08 NOTE — PROGRESS NOTES
Attempted to perform Q2 turns on patient, patient is noncompliant becoming agitated and yelling at staff with attempts at turning.

## 2017-05-08 NOTE — DISCHARGE PLANNING
Per [facility] request, spoke with Roderick at [facility] transport has been delayed 1 hour.  Jaiden at [facility] advised of change in transport time.  Discharge Summary faxed to [facility] at [phone].  Patient to transfer today at 1600 via [facility].

## 2017-05-08 NOTE — DISCHARGE PLANNING
Per CCT [facility] request, spoke with Crystal at [facility] they will accept patient in transfer today.  Patient to transfer via [facility] today at 1500.  Crystal at [facility] has been advised of transport time. [facility] has been advised.

## 2017-05-08 NOTE — DISCHARGE PLANNING
Medical Social Work  Faxed transport communication form and [facility] to [facility] for a tentative transport time of 3:00 to [place].

## 2017-05-08 NOTE — DISCHARGE INSTRUCTIONS
Discharge Instructions    Discharged to [facility] home by medical transportation with escort. Discharged via ambulance, hospital escort: Yes.  Special equipment needed: Not Applicable    Be sure to schedule a follow-up appointment with your primary care doctor or any specialists as instructed.     Discharge Plan:   Diet Plan: Discussed  Activity Level: Discussed  Confirmed Follow up Appointment: Appointment Scheduled  Confirmed Symptoms Management: Discussed  Medication Reconciliation Updated: Yes  Influenza Vaccine Indication: Not indicated: Previously immunized this influenza season and > 8 years of age    I understand that a diet low in cholesterol, fat, and sodium is recommended for good health. Unless I have been given specific instructions below for another diet, I accept this instruction as my diet prescription.   Other diet: Regular    Special Instructions: None    · Is patient discharged on Warfarin / Coumadin?   No     · Is patient Post Blood Transfusion?  No    Depression / Suicide Risk    As you are discharged from this [facility] facility, it is important to learn how to keep safe from harming yourself.    Recognize the warning signs:  · Abrupt changes in personality, positive or negative- including increase in energy   · Giving away possessions  · Change in eating patterns- significant weight changes-  positive or negative  · Change in sleeping patterns- unable to sleep or sleeping all the time   · Unwillingness or inability to communicate  · Depression  · Unusual sadness, discouragement and loneliness  · Talk of wanting to die  · Neglect of personal appearance   · Rebelliousness- reckless behavior  · Withdrawal from people/activities they love  · Confusion- inability to concentrate     If you or a loved one observes any of these behaviors or has concerns about self-harm, here's what you can do:  · Talk about it- your feelings and reasons for harming yourself  · Remove any means that you might use to  hurt yourself (examples: pills, rope, extension cords, firearm)  · Get professional help from the community (Mental Health, Substance Abuse, psychological counseling)  · Do not be alone:Call your Safe Contact- someone whom you trust who will be there for you.  · Call your local CRISIS HOTLINE 906-5470 or 998-924-6089  · Call your local Children's Mobile Crisis Response Team [place] (989) 184-0513 or www.Compufirst  · Call the toll free National Suicide Prevention Hotlines   · National Suicide Prevention Lifeline 905-271-KSKH (3926)  · National Hope Line Network 800-SUICIDE (988-3062)

## 2017-05-08 NOTE — DISCHARGE PLANNING
Received choice form from [facility] at [phone].  Referral sent to [facility] at [phone] on 05-08-17.

## 2017-05-14 ENCOUNTER — HOSPITAL ENCOUNTER (INPATIENT)
Facility: MEDICAL CENTER | Age: 82
LOS: 2 days | DRG: 871 | End: 2017-05-16
Attending: EMERGENCY MEDICINE | Admitting: HOSPITALIST
Payer: MEDICARE

## 2017-05-14 ENCOUNTER — RESOLUTE PROFESSIONAL BILLING HOSPITAL PROF FEE (OUTPATIENT)
Dept: HOSPITALIST | Facility: MEDICAL CENTER | Age: 82
End: 2017-05-14
Payer: MEDICARE

## 2017-05-14 ENCOUNTER — APPOINTMENT (OUTPATIENT)
Dept: RADIOLOGY | Facility: MEDICAL CENTER | Age: 82
DRG: 871 | End: 2017-05-14
Attending: EMERGENCY MEDICINE
Payer: MEDICARE

## 2017-05-14 DIAGNOSIS — Z91.81 RISK FOR FALLS: ICD-10-CM

## 2017-05-14 DIAGNOSIS — R41.82 ALTERED MENTAL STATUS, UNSPECIFIED ALTERED MENTAL STATUS TYPE: ICD-10-CM

## 2017-05-14 DIAGNOSIS — R14.0 ABDOMINAL DISTENTION: ICD-10-CM

## 2017-05-14 DIAGNOSIS — I63.9 ACUTE CVA (CEREBROVASCULAR ACCIDENT) (HCC): ICD-10-CM

## 2017-05-14 DIAGNOSIS — N30.00 ACUTE CYSTITIS WITHOUT HEMATURIA: ICD-10-CM

## 2017-05-14 DIAGNOSIS — E78.5 DYSLIPIDEMIA: Chronic | ICD-10-CM

## 2017-05-14 PROBLEM — A41.9 SEPSIS, UNSPECIFIED: Status: ACTIVE | Noted: 2017-05-14

## 2017-05-14 PROBLEM — N39.0 UTI (URINARY TRACT INFECTION): Status: ACTIVE | Noted: 2017-05-14

## 2017-05-14 PROBLEM — E87.20 LACTIC ACIDOSIS: Status: ACTIVE | Noted: 2017-05-14

## 2017-05-14 LAB
ALBUMIN SERPL BCP-MCNC: 3.7 G/DL (ref 3.2–4.9)
ALBUMIN/GLOB SERPL: 1.2 G/DL
ALP SERPL-CCNC: 170 U/L (ref 30–99)
ALT SERPL-CCNC: 94 U/L (ref 2–50)
ANION GAP SERPL CALC-SCNC: 11 MMOL/L (ref 0–11.9)
APPEARANCE UR: CLEAR
AST SERPL-CCNC: 93 U/L (ref 12–45)
BACTERIA #/AREA URNS HPF: ABNORMAL /HPF
BASOPHILS # BLD AUTO: 0.4 % (ref 0–1.8)
BASOPHILS # BLD: 0.05 K/UL (ref 0–0.12)
BILIRUB SERPL-MCNC: 1.4 MG/DL (ref 0.1–1.5)
BILIRUB UR QL STRIP.AUTO: NEGATIVE
BUN SERPL-MCNC: 38 MG/DL (ref 8–22)
CALCIUM SERPL-MCNC: 9.3 MG/DL (ref 8.5–10.5)
CHLORIDE SERPL-SCNC: 103 MMOL/L (ref 96–112)
CO2 SERPL-SCNC: 23 MMOL/L (ref 20–33)
COLOR UR: YELLOW
CREAT SERPL-MCNC: 0.99 MG/DL (ref 0.5–1.4)
EKG IMPRESSION: NORMAL
EOSINOPHIL # BLD AUTO: 0.01 K/UL (ref 0–0.51)
EOSINOPHIL NFR BLD: 0.1 % (ref 0–6.9)
ERYTHROCYTE [DISTWIDTH] IN BLOOD BY AUTOMATED COUNT: 54.7 FL (ref 35.9–50)
GFR SERPL CREATININE-BSD FRML MDRD: 53 ML/MIN/1.73 M 2
GLOBULIN SER CALC-MCNC: 3.1 G/DL (ref 1.9–3.5)
GLUCOSE SERPL-MCNC: 133 MG/DL (ref 65–99)
GLUCOSE UR STRIP.AUTO-MCNC: NEGATIVE MG/DL
HCT VFR BLD AUTO: 34.9 % (ref 37–47)
HGB BLD-MCNC: 11.4 G/DL (ref 12–16)
IMM GRANULOCYTES # BLD AUTO: 0.11 K/UL (ref 0–0.11)
IMM GRANULOCYTES NFR BLD AUTO: 0.8 % (ref 0–0.9)
KETONES UR STRIP.AUTO-MCNC: NEGATIVE MG/DL
LACTATE BLD-SCNC: 1.8 MMOL/L (ref 0.5–2)
LACTATE BLD-SCNC: 2.8 MMOL/L (ref 0.5–2)
LEUKOCYTE ESTERASE UR QL STRIP.AUTO: ABNORMAL
LYMPHOCYTES # BLD AUTO: 3.01 K/UL (ref 1–4.8)
LYMPHOCYTES NFR BLD: 22.3 % (ref 22–41)
MCH RBC QN AUTO: 28.2 PG (ref 27–33)
MCHC RBC AUTO-ENTMCNC: 32.7 G/DL (ref 33.6–35)
MCV RBC AUTO: 86.4 FL (ref 81.4–97.8)
MICRO URNS: ABNORMAL
MONOCYTES # BLD AUTO: 0.75 K/UL (ref 0–0.85)
MONOCYTES NFR BLD AUTO: 5.6 % (ref 0–13.4)
MUCOUS THREADS #/AREA URNS HPF: ABNORMAL /HPF
NEUTROPHILS # BLD AUTO: 9.58 K/UL (ref 2–7.15)
NEUTROPHILS NFR BLD: 70.8 % (ref 44–72)
NITRITE UR QL STRIP.AUTO: NEGATIVE
NRBC # BLD AUTO: 0 K/UL
NRBC BLD AUTO-RTO: 0 /100 WBC
PH UR STRIP.AUTO: 6 [PH]
PLATELET # BLD AUTO: 441 K/UL (ref 164–446)
PMV BLD AUTO: 9.6 FL (ref 9–12.9)
POTASSIUM SERPL-SCNC: 3.9 MMOL/L (ref 3.6–5.5)
PROT SERPL-MCNC: 6.8 G/DL (ref 6–8.2)
PROT UR QL STRIP: NEGATIVE MG/DL
RBC # BLD AUTO: 4.04 M/UL (ref 4.2–5.4)
RBC # URNS HPF: ABNORMAL /HPF
RBC UR QL AUTO: NEGATIVE
SODIUM SERPL-SCNC: 137 MMOL/L (ref 135–145)
SP GR UR STRIP.AUTO: 1.01
WBC # BLD AUTO: 13.5 K/UL (ref 4.8–10.8)
WBC #/AREA URNS HPF: ABNORMAL /HPF

## 2017-05-14 PROCEDURE — 700105 HCHG RX REV CODE 258: Performed by: EMERGENCY MEDICINE

## 2017-05-14 PROCEDURE — 36415 COLL VENOUS BLD VENIPUNCTURE: CPT

## 2017-05-14 PROCEDURE — 304561 HCHG STAT O2

## 2017-05-14 PROCEDURE — 87086 URINE CULTURE/COLONY COUNT: CPT

## 2017-05-14 PROCEDURE — 81001 URINALYSIS AUTO W/SCOPE: CPT

## 2017-05-14 PROCEDURE — 71010 DX-CHEST-PORTABLE (1 VIEW): CPT

## 2017-05-14 PROCEDURE — 99223 1ST HOSP IP/OBS HIGH 75: CPT | Mod: AI | Performed by: HOSPITALIST

## 2017-05-14 PROCEDURE — 70450 CT HEAD/BRAIN W/O DYE: CPT

## 2017-05-14 PROCEDURE — 85025 COMPLETE CBC W/AUTO DIFF WBC: CPT

## 2017-05-14 PROCEDURE — 96374 THER/PROPH/DIAG INJ IV PUSH: CPT

## 2017-05-14 PROCEDURE — 99285 EMERGENCY DEPT VISIT HI MDM: CPT

## 2017-05-14 PROCEDURE — 80053 COMPREHEN METABOLIC PANEL: CPT

## 2017-05-14 PROCEDURE — 96361 HYDRATE IV INFUSION ADD-ON: CPT

## 2017-05-14 PROCEDURE — 700117 HCHG RX CONTRAST REV CODE 255: Performed by: EMERGENCY MEDICINE

## 2017-05-14 PROCEDURE — 304538 HCHG NG TUBE

## 2017-05-14 PROCEDURE — 74177 CT ABD & PELVIS W/CONTRAST: CPT

## 2017-05-14 PROCEDURE — 770001 HCHG ROOM/CARE - MED/SURG/GYN PRIV*

## 2017-05-14 PROCEDURE — 700111 HCHG RX REV CODE 636 W/ 250 OVERRIDE (IP): Performed by: EMERGENCY MEDICINE

## 2017-05-14 PROCEDURE — 94760 N-INVAS EAR/PLS OXIMETRY 1: CPT

## 2017-05-14 PROCEDURE — 93005 ELECTROCARDIOGRAM TRACING: CPT | Performed by: EMERGENCY MEDICINE

## 2017-05-14 PROCEDURE — 700111 HCHG RX REV CODE 636 W/ 250 OVERRIDE (IP): Performed by: HOSPITALIST

## 2017-05-14 PROCEDURE — 83605 ASSAY OF LACTIC ACID: CPT | Mod: 91

## 2017-05-14 RX ORDER — LORAZEPAM 2 MG/ML
5 INJECTION INTRAMUSCULAR EVERY 4 HOURS PRN
Status: DISCONTINUED | OUTPATIENT
Start: 2017-05-14 | End: 2017-05-16

## 2017-05-14 RX ORDER — POLYVINYL ALCOHOL 14 MG/ML
2 SOLUTION/ DROPS OPHTHALMIC EVERY 6 HOURS PRN
Status: DISCONTINUED | OUTPATIENT
Start: 2017-05-14 | End: 2017-05-14

## 2017-05-14 RX ORDER — CEFTRIAXONE 2 G/1
2 INJECTION, POWDER, FOR SOLUTION INTRAMUSCULAR; INTRAVENOUS ONCE
Status: DISCONTINUED | OUTPATIENT
Start: 2017-05-14 | End: 2017-05-14

## 2017-05-14 RX ORDER — SODIUM CHLORIDE 9 MG/ML
1000 INJECTION, SOLUTION INTRAVENOUS ONCE
Status: COMPLETED | OUTPATIENT
Start: 2017-05-14 | End: 2017-05-14

## 2017-05-14 RX ORDER — LORAZEPAM 2 MG/ML
1-2 INJECTION INTRAMUSCULAR EVERY 4 HOURS PRN
Status: DISCONTINUED | OUTPATIENT
Start: 2017-05-14 | End: 2017-05-16

## 2017-05-14 RX ORDER — LACTULOSE 20 G/30ML
15-30 SOLUTION ORAL
Status: DISCONTINUED | OUTPATIENT
Start: 2017-05-14 | End: 2017-05-16

## 2017-05-14 RX ORDER — LORAZEPAM 2 MG/ML
3-4 INJECTION INTRAMUSCULAR EVERY 4 HOURS PRN
Status: DISCONTINUED | OUTPATIENT
Start: 2017-05-14 | End: 2017-05-16

## 2017-05-14 RX ORDER — ONDANSETRON 4 MG/1
8 TABLET, ORALLY DISINTEGRATING ORAL EVERY 8 HOURS PRN
Status: DISCONTINUED | OUTPATIENT
Start: 2017-05-14 | End: 2017-05-16

## 2017-05-14 RX ORDER — ONDANSETRON 4 MG/1
4 TABLET, ORALLY DISINTEGRATING ORAL EVERY 8 HOURS PRN
Status: DISCONTINUED | OUTPATIENT
Start: 2017-05-14 | End: 2017-05-16

## 2017-05-14 RX ORDER — ONDANSETRON 4 MG/1
4 TABLET, ORALLY DISINTEGRATING ORAL EVERY 8 HOURS PRN
Status: ON HOLD | COMMUNITY
End: 2017-05-16

## 2017-05-14 RX ORDER — ONDANSETRON 2 MG/ML
4 INJECTION INTRAMUSCULAR; INTRAVENOUS EVERY 8 HOURS PRN
Status: DISCONTINUED | OUTPATIENT
Start: 2017-05-14 | End: 2017-05-16

## 2017-05-14 RX ORDER — ONDANSETRON 2 MG/ML
8 INJECTION INTRAMUSCULAR; INTRAVENOUS EVERY 8 HOURS PRN
Status: DISCONTINUED | OUTPATIENT
Start: 2017-05-14 | End: 2017-05-16

## 2017-05-14 RX ORDER — SCOLOPAMINE TRANSDERMAL SYSTEM 1 MG/1
1 PATCH, EXTENDED RELEASE TRANSDERMAL
Status: DISCONTINUED | OUTPATIENT
Start: 2017-05-14 | End: 2017-05-16 | Stop reason: HOSPADM

## 2017-05-14 RX ADMIN — CEFTRIAXONE 2 G: 2 INJECTION, POWDER, FOR SOLUTION INTRAMUSCULAR; INTRAVENOUS at 19:31

## 2017-05-14 RX ADMIN — LORAZEPAM 2 MG: 2 INJECTION INTRAMUSCULAR at 23:54

## 2017-05-14 RX ADMIN — SODIUM CHLORIDE 1000 ML: 9 INJECTION, SOLUTION INTRAVENOUS at 19:31

## 2017-05-14 RX ADMIN — IOHEXOL 100 ML: 350 INJECTION, SOLUTION INTRAVENOUS at 20:12

## 2017-05-14 NOTE — IP AVS SNAPSHOT
" <p align=\"LEFT\"><IMG SRC=\"//[email or web address]\" alt=\"Image\" WIDTH=\"50%\" HEIGHT=\"200\" BORDER=\"\"></p>                   Name:Arlen Cruz  Medical Record Number:[id number]  CSN: [id number]    YOB: 1929   Age: 88 y.o.  Sex: female  HT:1.575 m (5' 2\") WT: 71.215 kg (157 lb)          Admit Date: 5/14/2017     Discharge Date:   Today's Date: 5/16/2017  Attending Doctor:  Richar Marie M.D.                  Allergies:  Hctz; Avelox; and Codeine          Your appointments     Jun 26, 2017  1:40 PM   Follow Up Visit with Judy Wood M.D.   [facility] (--)    [address], [address]  [facility] [address] [phone]           You will be receiving a confirmation call a few days before your appointment from our automated call confirmation system.              Follow-up Information     1. Follow up with Marisa Santana M.D.. Schedule an appointment as soon as possible for a visit in 2 weeks.    Specialty:  Geriatrics    Why:  Hospital follow-up appointment with PCP    Contact information    [address]  [facility] [id number] [phone]          2. Follow up with [facility] .    Specialties:  Skilled Nursing Facility, Rehabilitation    Contact information    [address] [address] [phone]         Medication List      Take these Medications        Instructions    artificial tears 1.4 % Soln    Place 2 Drops in both eyes every 6 hours as needed (Dry eyes   ).   Dose:  2 Drop       lidocaine viscous 2% 2 % Soln   Commonly known as:  XYLOCAINE    Take 5 mL by mouth every four hours as needed for Throat/Mouth Pain (Comfort).   Dose:  5 mL       lorazepam 1 MG Tabs   Commonly known as:  ATIVAN    Place 1 Tab under tongue every four hours as needed (Anxiety / restlessness).   Dose:  1 mg       ondansetron 8 MG Tbdp   What changed:    - medication strength  - how much to take   Commonly known as:  ZOFRAN ODT    Take 1 Tab by " mouth every 8 hours as needed for Nausea/Vomiting.   Dose:  8 mg       oxycodone 20 MG/ML Conc    Take 0.5 mL by mouth every 30 minutes as needed (pain).   Dose:  10 mg       scopolamine 1.5 MG/3DAYS Pt72   Commonly known as:  TRANSDERM-SCOP    Apply 1 Patch to skin as directed every 72 hours.   Dose:  1 Patch

## 2017-05-14 NOTE — IP AVS SNAPSHOT
" Home Care Instructions                                                                                                                  Name:Arlen Cruz  Medical Record Number:[id number]  CSN: [id number]    YOB: 1929   Age: 88 y.o.  Sex: female  HT:1.575 m (5' 2\") WT: 71.215 kg (157 lb)          Admit Date: 5/14/2017     Discharge Date:   Today's Date: 5/16/2017  Attending Doctor:  Richar Marie M.D.                  Allergies:  Hctz; Avelox; and Codeine            Discharge Instructions       Discharge Instructions    Discharged to [facility] by medical transportation with escort. Discharged via ambulance, hospital escort: Yes.  Special equipment needed: Not Applicable    Be sure to schedule a follow-up appointment with your primary care doctor or any specialists as instructed.     Discharge Plan:   Diet Plan: Discussed  Activity Level: Discussed  Confirmed Follow up Appointment: No (Comments)  Confirmed Symptoms Management: Discussed  Medication Reconciliation Updated: Yes  Influenza Vaccine Indication: Patient Refuses    I understand that a diet low in cholesterol, fat, and sodium is recommended for good health. Unless I have been given specific instructions below for another diet, I accept this instruction as my diet prescription.   Other diet: NA    Special Instructions: NA    · Is patient discharged on Warfarin / Coumadin?   No     · Is patient Post Blood Transfusion?  No    Depression / Suicide Risk    As you are discharged from this [facility] facility, it is important to learn how to keep safe from harming yourself.    Recognize the warning signs:  · Abrupt changes in personality, positive or negative- including increase in energy   · Giving away possessions  · Change in eating patterns- significant weight changes-  positive or negative  · Change in sleeping patterns- unable to sleep or sleeping all the time   · Unwillingness or inability to " communicate  · Depression  · Unusual sadness, discouragement and loneliness  · Talk of wanting to die  · Neglect of personal appearance   · Rebelliousness- reckless behavior  · Withdrawal from people/activities they love  · Confusion- inability to concentrate     If you or a loved one observes any of these behaviors or has concerns about self-harm, here's what you can do:  · Talk about it- your feelings and reasons for harming yourself  · Remove any means that you might use to hurt yourself (examples: pills, rope, extension cords, firearm)  · Get professional help from the community (Mental Health, Substance Abuse, psychological counseling)  · Do not be alone:Call your Safe Contact- someone whom you trust who will be there for you.  · Call your local CRISIS HOTLINE 543-2822 or 154-633-9965  · Call your local Children's Mobile Crisis Response Team [place] (180) 880-8973 or www.[employer]  · Call the toll free National Suicide Prevention Hotlines   · National Suicide Prevention Lifeline 622-980-JFLN (3433)  · [employer] Line Network 800-SUICIDE (040-8247)        Your appointments     Jun 26, 2017  1:40 PM   Follow Up Visit with Judy Wood M.D.   [facility] (--)    [address], [address]  [facility] [address] [phone]           You will be receiving a confirmation call a few days before your appointment from our automated call confirmation system.              Follow-up Information     1. Follow up with Marisa Santana M.D.. Schedule an appointment as soon as possible for a visit in 2 weeks.    Specialty:  Geriatrics    Why:  Hospital follow-up appointment with PCP    Contact information    [address]  [facility] [address] [phone]          2. Follow up with [facility] .    Specialties:  Skilled Nursing Facility, Rehabilitation    Contact information    [address] [facility] [id number] [phone]         Discharge Medication  Instructions:    Below are the medications your physician expects you to take upon discharge:    Review all your home medications and newly ordered medications with your doctor and/or pharmacist. Follow medication instructions as directed by your doctor and/or pharmacist.    Please keep your medication list with you and share with your physician.               Medication List      START taking these medications        Instructions    Morning Afternoon Evening Bedtime    artificial tears 1.4 % Soln        Place 2 Drops in both eyes every 6 hours as needed (Dry eyes   ).   Dose:  2 Drop                        lidocaine viscous 2% 2 % Soln   Commonly known as:  XYLOCAINE        Take 5 mL by mouth every four hours as needed for Throat/Mouth Pain (Comfort).   Dose:  5 mL                        lorazepam 1 MG Tabs   Commonly known as:  ATIVAN        Place 1 Tab under tongue every four hours as needed (Anxiety / restlessness).   Dose:  1 mg                        oxycodone 20 MG/ML Conc   Replaces:  oxycodone immediate-release 5 MG Tabs        Take 0.5 mL by mouth every 30 minutes as needed (pain).   Dose:  10 mg                        scopolamine 1.5 MG/3DAYS Pt72   Last time this was given:  1 Patch on 5/15/2017  9:48 AM   Commonly known as:  TRANSDERM-SCOP        Apply 1 Patch to skin as directed every 72 hours.   Dose:  1 Patch                          CHANGE how you take these medications        Instructions    Morning Afternoon Evening Bedtime    ondansetron 8 MG Tbdp   What changed:    - medication strength  - how much to take   Commonly known as:  ZOFRAN ODT        Take 1 Tab by mouth every 8 hours as needed for Nausea/Vomiting.   Dose:  8 mg                          STOP taking these medications     ascorbic acid 500 MG Tabs   Commonly known as:  ascorbic acid               aspirin 325 MG Tabs   Commonly known as:  ASA               digoxin 125 MCG Tabs   Commonly known as:  DIGOX               NAMENDA XR 28 MG  Cp24   Generic drug:  Memantine HCl ER               omeprazole 20 MG delayed-release capsule   Commonly known as:  PRILOSEC               oxybutynin SR 5 MG Tb24   Commonly known as:  DITROPAN-XL               oxyCODONE CR 10 MG T12a   Commonly known as:  OXYCONTIN               oxycodone immediate-release 5 MG Tabs   Commonly known as:  ROXICODONE   Replaced by:  oxycodone 20 MG/ML Conc               PROBIOTIC DAILY PO                    Where to Get Your Medications      Information about where to get these medications is not yet available     ! Ask your nurse or doctor about these medications    - artificial tears 1.4 % Soln  - lidocaine viscous 2% 2 % Soln  - lorazepam 1 MG Tabs  - ondansetron 8 MG Tbdp  - oxycodone 20 MG/ML Conc  - scopolamine 1.5 MG/3DAYS Pt72            Orders for after discharge     REFERRAL TO HOSPICE    Complete by:  As directed        REFERRAL TO HOSPICE    Complete by:  As directed        REFERRAL TO SKILLED NURSING FACILITY    Complete by:  As directed              Instructions           Diet / Nutrition:    Follow any diet instructions given to you by your doctor or the dietician, including how much salt (sodium) you are allowed each day.    If you are overweight, talk to your doctor about a weight reduction plan.    Activity:    Remain physically active following your doctor's instructions about exercise and activity.    Rest often.     Any time you become even a little tired or short of breath, SIT DOWN and rest.    Worsening Symptoms:    Report any of the following signs and symptoms to the doctor's office immediately:    *Pain of jaw, arm, or neck  *Chest pain not relieved by medication                               *Dizziness or loss of consciousness  *Difficulty breathing even when at rest   *More tired than usual                                       *Bleeding drainage or swelling of surgical site  *Swelling of feet, ankles, legs or stomach                 *Fever  (>100ºF)  *Pink or blood tinged sputum  *Weight gain (3lbs/day or 5lbs /week)           *Shock from internal defibrillator (if applicable)  *Palpitations or irregular heartbeats                *Cool and/or numb extremities    Stroke Awareness    Common Risk Factors for Stroke include:    Age  Atrial Fibrillation  Carotid Artery Stenosis  Diabetes Mellitus  Excessive alcohol consumption  High blood pressure  Overweight   Physical inactivity  Smoking    Warning signs and symptoms of a stroke include:    *Sudden numbness or weakness of the face, arm or leg (especially on one side of the body).  *Sudden confusion, trouble speaking or understanding.  *Sudden trouble seeing in one or both eyes.  *Sudden trouble walking, dizziness, loss of balance or coordination.Sudden severe headache with no known cause.    It is very important to get treatment quickly when a stroke occurs. If you experience any of the above warning signs, call 911 immediately.                   Disclaimer         Quit Smoking / Tobacco Use:    I understand the use of any tobacco products increases my chance of suffering from future heart disease or stroke and could cause other illnesses which may shorten my life. Quitting the use of tobacco products is the single most important thing I can do to improve my health. For further information on smoking / tobacco cessation call a Toll Free Quit Line at 1-167.681.9065 (*National Cancer [place]) or 1-127.831.7688 (American Lung Association) or you can access the web based program at www.lungusa.org.    [place] Tobacco Users Help Line:  (981) 131-2959       Toll Free: 1-857.285.9833  Quit Tobacco Program [facility] (965)207-1731    Crisis Hotline:    [place] Crisis Hotline:  6-249-IACXWAJ or 1-177.130.9009    [place] Crisis Hotline:    1-740.577.1861 or 812-312-7233    Discharge Survey:   Thank you for choosing [facility]. We hope we did everything we could to make your hospital stay  a pleasant one. You may be receiving a phone survey and we would appreciate your time and participation in answering the questions. Your input is very valuable to us in our efforts to improve our service to our patients and their families.        My signature on this form indicates that:    1. I have reviewed and understand the above information.  2. My questions regarding this information have been answered to my satisfaction.  3. I have formulated a plan with my discharge nurse to obtain my prescribed medications for home.                  Disclaimer         __________________________________                     __________       ________                       Patient Signature                                                 Date                    Time

## 2017-05-14 NOTE — IP AVS SNAPSHOT
5/16/2017    Arlen Cruz  [address] [facility] [address]  [place] [address]    Dear Arlen Singh:    [facility] wants to ensure your discharge home is safe and you or your loved ones have had all of your questions answered regarding your care after you leave the hospital.    Below is a list of resources and contact information should you have any questions regarding your hospital stay, follow-up instructions, or active medical symptoms.    Questions or Concerns Regarding… Contact   Medical Questions Related to Your Discharge  (7 days a week, 8am-5pm) Contact a Nurse Care Coordinator   [phone]   Medical Questions Not Related to Your Discharge  (24 hours a day / 7 days a week)  Contact the Nurse Health Line   [phone]    Medications or Discharge Instructions Refer to your discharge packet   or contact your [facility] Primary Care Provider   [phone]   Follow-up Appointment(s) Schedule your appointment via [employer]   or contact Scheduling [phone]   Billing Review your statement via [employer]  or contact Billing [phone]   Medical Records Review your records via [employer]   or contact Medical Records [phone]     You may receive a telephone call within two days of discharge. This call is to make certain you understand your discharge instructions and have the opportunity to have any questions answered. You can also easily access your medical information, test results and upcoming appointments via the [employer] free online health management tool. You can learn more and sign up at [email or web address]. For assistance setting up your [employer] account, please call [phone].    Once again, we want to ensure your discharge home is safe and that you have a clear understanding of any next steps in your care. If you have any questions or concerns, please do not hesitate to contact us, we are here for you. Thank you for choosing [facility] for your healthcare needs.    Sincerely,    Your [facility]  Healthcare Team

## 2017-05-14 NOTE — IP AVS SNAPSHOT
eMinor Access Code: Activation code not generated  Current eMinor Status: Active    [employer]  A secure, online tool to manage your health information     [employer]’s eMinor® is a secure, online tool that connects you to your personalized health information from the privacy of your home -- day or night - making it very easy for you to manage your healthcare. Once the activation process is completed, you can even access your medical information using the eMinor parveen, which is available for free in the [employer] Parveen store or Google Play store.     eMinor provides the following levels of access (as shown below):   My Chart Features   [facility] Primary Care Doctor [facility]  Specialists [facility]  Urgent  Care Non-[facility]  Primary Care  Doctor   Email your healthcare team securely and privately 24/7 X X X X   Manage appointments: schedule your next appointment; view details of past/upcoming appointments X      Request prescription refills. X      View recent personal medical records, including lab and immunizations X X X X   View health record, including health history, allergies, medications X X X X   Read reports about your outpatient visits, procedures, consult and ER notes X X X X   See your discharge summary, which is a recap of your hospital and/or ER visit that includes your diagnosis, lab results, and care plan. X X       How to register for eMinor:  1. Go to  [email or web address].  2. Click on the Sign Up Now box, which takes you to the New Member Sign Up page. You will need to provide the following information:  a. Enter your eMinor Access Code exactly as it appears at the top of this page. (You will not need to use this code after you’ve completed the sign-up process. If you do not sign up before the expiration date, you must request a new code.)   b. Enter your date of birth.   c. Enter your home email address.   d. Click Submit, and follow the next screen’s instructions.  3. Create a eMinor ID. This will  be your [employer] login ID and cannot be changed, so think of one that is secure and easy to remember.  4. Create a [employer] password. You can change your password at any time.  5. Enter your Password Reset Question and Answer. This can be used at a later time if you forget your password.   6. Enter your e-mail address. This allows you to receive e-mail notifications when new information is available in [employer].  7. Click Sign Up. You can now view your health information.    For assistance activating your [employer] account, call [phone]

## 2017-05-15 PROCEDURE — A9270 NON-COVERED ITEM OR SERVICE: HCPCS | Performed by: HOSPITALIST

## 2017-05-15 PROCEDURE — 770001 HCHG ROOM/CARE - MED/SURG/GYN PRIV*

## 2017-05-15 PROCEDURE — 99231 SBSQ HOSP IP/OBS SF/LOW 25: CPT | Performed by: INTERNAL MEDICINE

## 2017-05-15 PROCEDURE — 700102 HCHG RX REV CODE 250 W/ 637 OVERRIDE(OP): Performed by: HOSPITALIST

## 2017-05-15 PROCEDURE — 700111 HCHG RX REV CODE 636 W/ 250 OVERRIDE (IP): Performed by: HOSPITALIST

## 2017-05-15 RX ADMIN — LORAZEPAM 1 MG: 2 INJECTION INTRAMUSCULAR at 11:09

## 2017-05-15 RX ADMIN — LORAZEPAM 1 MG: 2 INJECTION INTRAMUSCULAR at 18:19

## 2017-05-15 RX ADMIN — HYDROMORPHONE HYDROCHLORIDE 1 MG: 1 INJECTION, SOLUTION INTRAMUSCULAR; INTRAVENOUS; SUBCUTANEOUS at 20:12

## 2017-05-15 RX ADMIN — SCOPOLAMINE 1 PATCH: 1 PATCH, EXTENDED RELEASE TRANSDERMAL at 09:48

## 2017-05-15 NOTE — RESPIRATORY CARE
COPD EDUCATION by COPD CLINICAL EDUCATOR  5/15/2017 at 6:44 AM by Cyndi Aranda     Patient reviewed by COPD education team. Patient does not qualify for COPD program.

## 2017-05-15 NOTE — PROGRESS NOTES
Patient resting quietly in bed, no S/S of distress, AA&Ox0. Medicated with 1 mg ativan prior to NG tube removal and huntley placement. Q 2 turns in place. Bed alarm on, bed in lowest position, bed locked, RN and CNA numbers provided, no further needs at this time. No changes from EPIC. Hourly rounding in place. Family at bedside.

## 2017-05-15 NOTE — ED PROVIDER NOTES
ED Provider Note    CHIEF COMPLAINT  Chief Complaint   Patient presents with   • ALOC       HPI  Arlen Cruz is a 88 y.o. female who presents via ambulance from [facility] for evaluation of altered mental status. The patient has a history of severe dementia and this significantly limits the history at this point. She is one week status post discharge from the hospital following a fall and pelvic fracture. The details of her altered mentation at this point are not clear, there is a son at the bedside who is really not up-to-date on the patient's current medical issues. At this time no other history is available    REVIEW OF SYSTEMS  Unobtainable    PAST MEDICAL HISTORY  Past Medical History   Diagnosis Date   • Stress incontinence 6/19/2009   • Dyslipidemia 6/19/2009   • HTN (hypertension), benign 6/19/2009   • OSTEOPOROSIS 6/19/2009   • Hyperlipidemia    • Atrial fibrillation (CMS-HCC)    • Psychiatric disorder    • Allergy, unspecified not elsewhere classified    • Arthritis    • Unspecified cataract    • CHF (congestive heart failure) (CMS-HCC)    • Chronic airway obstruction, not elsewhere classified    • Depression    • GERD (gastroesophageal reflux disease)    • Kidney disease    • Stroke (CMS-HCC)    • Urinary tract infection, site not specified        FAMILY HISTORY  Family History   Problem Relation Age of Onset   • Hypertension Father    • Cancer Other    • Fainting Mother        SOCIAL HISTORY  Social History   Substance Use Topics   • Smoking status: Never Smoker    • Smokeless tobacco: Never Used   • Alcohol Use: No       SURGICAL HISTORY  Past Surgical History   Procedure Laterality Date   • Cholecystectomy     • Abdominal hysterectomy total         CURRENT MEDICATIONS  I personally reviewed the medication list in the charting documentation.     ALLERGIES  Allergies   Allergen Reactions   • Hctz Unspecified     Hyponatremia     • Avelox [Moxifloxacin Hcl In Nacl]    • Codeine  Rash             MEDICAL RECORD  I have reviewed patient's medical record and pertinent results are listed above.      PHYSICAL EXAM  VITAL SIGNS: /114 mmHg  Pulse 98  Temp(Src) 36.3 °C (97.3 °F)  Resp 24  SpO2 96%   Constitutional: Elderly appearing, appears acutely ill  HENT: Mucus membranes dry.    Eyes: No scleral icterus. Normal conjunctiva   Neck: Supple, comfortable, nonpainful range of motion.   Cardiovascular: Irregularly irregular and tachycardic during exam.   Thorax & Lungs: Chest is nontender.  Lungs are clear to auscultation with good air movement bilaterally.  No wheeze, rhonchi, nor rales.   Abdomen: Soft, firm, slightly distended, diffusely tender with evidence of rebound  Skin: Warm, dry. No rash appreciated  Extremities/Musculoskeletal: No sign of trauma. No asymmetric calf tenderness, erythema or edema. Normal range of motion   Neurologic: Somnolent but arousable to verbal stimulation, will not follow commands, disoriented.     DIAGNOSTIC STUDIES / PROCEDURES    LABS  Results for orders placed or performed during the hospital encounter of 05/14/17   Lactic acid (lactate)   Result Value Ref Range    Lactic Acid 1.8 0.5 - 2.0 mmol/L   Lactic acid (lactate)   Result Value Ref Range    Lactic Acid 2.8 (H) 0.5 - 2.0 mmol/L   CBC WITH DIFFERENTIAL   Result Value Ref Range    WBC 13.5 (H) 4.8 - 10.8 K/uL    RBC 4.04 (L) 4.20 - 5.40 M/uL    Hemoglobin 11.4 (L) 12.0 - 16.0 g/dL    Hematocrit 34.9 (L) 37.0 - 47.0 %    MCV 86.4 81.4 - 97.8 fL    MCH 28.2 27.0 - 33.0 pg    MCHC 32.7 (L) 33.6 - 35.0 g/dL    RDW 54.7 (H) 35.9 - 50.0 fL    Platelet Count 441 164 - 446 K/uL    MPV 9.6 9.0 - 12.9 fL    Neutrophils-Polys 70.80 44.00 - 72.00 %    Lymphocytes 22.30 22.00 - 41.00 %    Monocytes 5.60 0.00 - 13.40 %    Eosinophils 0.10 0.00 - 6.90 %    Basophils 0.40 0.00 - 1.80 %    Immature Granulocytes 0.80 0.00 - 0.90 %    Nucleated RBC 0.00 /100 WBC    Neutrophils (Absolute) 9.58 (H) 2.00 - 7.15 K/uL     Lymphs (Absolute) 3.01 1.00 - 4.80 K/uL    Monos (Absolute) 0.75 0.00 - 0.85 K/uL    Eos (Absolute) 0.01 0.00 - 0.51 K/uL    Baso (Absolute) 0.05 0.00 - 0.12 K/uL    Immature Granulocytes (abs) 0.11 0.00 - 0.11 K/uL    NRBC (Absolute) 0.00 K/uL   COMP METABOLIC PANEL   Result Value Ref Range    Sodium 137 135 - 145 mmol/L    Potassium 3.9 3.6 - 5.5 mmol/L    Chloride 103 96 - 112 mmol/L    Co2 23 20 - 33 mmol/L    Anion Gap 11.0 0.0 - 11.9    Glucose 133 (H) 65 - 99 mg/dL    Bun 38 (H) 8 - 22 mg/dL    Creatinine 0.99 0.50 - 1.40 mg/dL    Calcium 9.3 8.5 - 10.5 mg/dL    AST(SGOT) 93 (H) 12 - 45 U/L    ALT(SGPT) 94 (H) 2 - 50 U/L    Alkaline Phosphatase 170 (H) 30 - 99 U/L    Total Bilirubin 1.4 0.1 - 1.5 mg/dL    Albumin 3.7 3.2 - 4.9 g/dL    Total Protein 6.8 6.0 - 8.2 g/dL    Globulin 3.1 1.9 - 3.5 g/dL    A-G Ratio 1.2 g/dL   URINALYSIS   Result Value Ref Range    Micro Urine Req Microscopic     Color Yellow     Character Clear     Specific Gravity 1.011 <1.035    Ph 6.0 5.0-8.0    Glucose Negative Negative mg/dL    Ketones Negative Negative mg/dL    Protein Negative Negative mg/dL    Bilirubin Negative Negative    Nitrite Negative Negative    Leukocyte Esterase Large (A) Negative    Occult Blood Negative Negative   ESTIMATED GFR   Result Value Ref Range    GFR If African American >60 >60 mL/min/1.73 m 2    GFR If Non  53 (A) >60 mL/min/1.73 m 2   URINE MICROSCOPIC (W/UA)   Result Value Ref Range    WBC 10-20 (A) /hpf    RBC 0-2 /hpf    Bacteria Moderate (A) None /hpf    Mucous Threads Few /hpf         RADIOLOGY  CT-ABDOMEN-PELVIS WITH   Final Result         1. Acute to subacute fractures of the left superior and inferior pubic rami with left pelvic sidewall hematoma compressing the left side of the urinary bladder.      2. Diffuse dilatation of the colon with air-fluid levels with relatively less distended sigmoid colon. The finding could relate to colonic ileus. Distal colonic obstruction or  partial obstruction around the sigmoid colon area cannot be entirely excluded.      3. Distended stomach with decompressed pylorus and duodenum, concerning for gastric outlet obstruction. Potentially, the very dilated hepatic flexure of the colon could compress the distal stomach and cause the obstruction.      4. Thickening of the distal esophageal wall, concerning for esophagitis.      5. Status post cholecystectomy. Intra and extrahepatic biliary ductal dilatation is likely related to postcholecystectomy status      6. Small bilateral pleural effusions with associated bibasilar opacities, atelectasis versus consolidation.      7. Mild dilatation of the ascending aorta, measuring up to 4.0 cm.      CT-HEAD W/O   Final Result         1. Ill-defined low-attenuation area in the left occipital lobe, in keeping with acute infarct.   2. Tiny foci of high attenuation in the middle could relate to thrombosed vessels versus small hemorrhagic conversion.      MRI would be helpful for further evaluation.      CRITICAL RESULT READ BACK: Preliminary findings discussed with and critical read back performed by Dr. DANK MOTLEY in the Emergency Department via telephone on 5/14/2017 8:14 PM      DX-CHEST-PORTABLE (1 VIEW)   Final Result      Bibasilar opacities may represent hypoinflation atelectasis.      Right upper lobe focal opacity is not significantly changed. This could represent scarring.      Atherosclerotic plaque.      Dilated loops of bowel in the visualized upper abdomen. Findings may be related to ileus or bowel obstruction. A CT of the abdomen and pelvis is currently pending.            COURSE & MEDICAL DECISION MAKING  I have reviewed any medical record information, laboratory studies and radiographic results as noted above.  Differential diagnoses includes: UTI, pneumonia, intracranial injury, dehydration, bowel obstruction, perforated viscus, bowel ischemia, sepsis    Encounter Summary: This is a 88 y.o. female  with altered mentation with unclear details about the alteration in mentation, just discharged one week ago from the hospital following a pelvic fracture admission. Really have no other information about this patient at this time, she presents ill-appearing and tachycardic in atrial fibrillation, we'll check the usual culprits for infectious etiologies which I suspect will likely be the case, we'll also check a head CT to rule out intercranial injury. On exam she does seem to have an acute abdomen although the exam is difficult but I think she is distended and diffusely tender with some evidence of peritonitis so a CT will also be obtained. Will administer broad-spectrum antibiotics, IV fluids and alteration be admitted to the hospital. Given her advanced age and presentation I am concerned about the probability of acute decompensation and she will be monitored very closely here in the emergency department -----evaluation is significant for evidence of urinary tract infection with leukocytosis, CT of the head reveals an acute stroke of unknown onset so at this time the patient is not a candidate for alteplase. Additionally, CT abdomen reveals evidence of gastric outlet obstruction as well as possible colonic obstruction. There are also findings related to the recent pelvic fractures. At a long discussion with the patient's son and daughter as well as a other daughter via telephone, Isabel was a power of  and after discussion everyone is in agreement that comfort care and hospice treatment is appropriate for this patient. At this point all aggressive measures have been discontinued, I had an NG tube placed for the patient's comfort for decompression of her stomach. She has been admitted for establishment of hospice and comfort care    CRITICAL CARE  The very real possibilty of a deterioration of this patient's condition required the highest level of my preparedness for sudden, emergent intervention.  I  provided critical care services, which included medication orders, frequent reevaluations of the patient's condition and response to treatment, ordering and reviewing test results, and discussing the case with various consultants.  The critical care time associated with the care of the patient was 35 minutes. Review chart for interventions. This time is exclusive of any other billable procedures.         DISPOSITION: Admit for comfort care      FINAL IMPRESSION  1. Acute CVA (cerebrovascular accident) (CMS-HCC)    2. Abdominal distention    3. Acute cystitis without hematuria    4. Altered mental status, unspecified altered mental status type           This dictation was created using voice recognition software. The accuracy of the dictation is limited to the abilities of the software. I expect there may be some errors of grammar and possibly content. The nursing notes were reviewed and certain aspects of this information were incorporated into this note.    Electronically signed by: Umer Porter, 5/14/2017 7:12 PM

## 2017-05-15 NOTE — DISCHARGE PLANNING
Referral: SNF    Intervention: [facility] requested SNF Order, GRETA reyes.    Plan: SNF and Hospice, pending SNF Order.

## 2017-05-15 NOTE — DOCUMENTATION QUERY
"DOCUMENTATION QUERY    PROVIDERS: Please select “Cosign w/ note”to reply to query.    To better represent the severity of illness of your patient, please review the following information and exercise your independent professional judgment in responding to this query.     \"Altered mental state\" is documented in the History and Physical and Progress Notes. Based upon the clinical findings, risk factors, and treatment, can this diagnosis be further specified?    • Acute encephalopathy (if so, please specify type [metabolic, hepatic, toxic, alcoholic, etc.])  • Psychosis (if so, please specify type [acute hysterical, alcoholic, etc.])  • Delirium (if so, please specify underlying cause [alcohol intoxication, alcohol withdrawal, multiple etiologies, unknown etiology])   • Other explanation of clinical findings  • Unable to determine    The medical record reflects the following:   Clinical Findings  documented \"AMS\"; dx Sepsis; dx UTI; dx Lactic Acidosis   Treatment  Zosyn; CBC; BMP; CXR; CT Head; CT Abd/Pelvis   Risk Factors  Age; recent hx SBO; dx Sepsis; dx UTI; dx Lactic Acidosis; dx Reoccurrence of Bowel Obstruction; hx Dementia; hx HTN; hx HTN   Location within medical record  History and Physical, Progress Notes and MAR.     Thank you,     Orlin Hyman  Clinical   P: [phone]    Acute metabolic encephalopathy  "

## 2017-05-15 NOTE — H&P
CHIEF COMPLAINT:  Brought in by family members for altered mentation.      PRIMARY MEDICAL PHYSICIAN:  Marisa Santana MD      HISTORY OF PRESENT ILLNESS:  This is an 88-year-old female with multiple   medical issues who was recently admitted for a partial small-bowel obstruction   and CVA and had been at [facility] for a pelvic   fracture who was brought in from the skilled nursing facility for altered   mentation.  The patient carries a history of severe dementia and she is   currently nearly obtunded and unable to provide history.  Additionally, family   members are no longer at bedside.  Therefore, history is obtained in review   of medical records as well as discussion with the ER physician.  As noted   above, the patient was discharged to a skilled nursing facility a week ago for   pelvic fracture.  She apparently became altered today.  Otherwise, no further   history can be obtained.  Upon assessment in the ER, the patient is noted to   have evidence of sepsis with leukocytosis, lactic acidosis, tachycardia and   tachypnea.  She also has a distended belly with likely reoccurrence of bowel   obstruction.      REVIEW OF SYSTEMS:  A full review of systems was completed and all pertinent   positives and negatives are included in the HPI above.      PAST MEDICAL HISTORY:    1.  Hypertension.    2.  Hyperlipidemia.    3.  Atrial fibrillation.    4.  Chronic kidney disease.    5.  CVA.    6.  Depression.      PAST SURGICAL HISTORY:    1.  Cholecystectomy.    2.  Hysterectomy.      SOCIAL HISTORY:  No tobacco, alcohol, or illicit drugs.      FAMILY HISTORY:  Father with hypertension.  All the above was obtained in   review of the medical records.      ALLERGIES:  HYDROCHLOROTHIAZIDE, AVELOX, AND CODEINE.      HOME MEDICATIONS:  From the medical record:    1.  Probiotic.    2.  Zofran 4 mg p.o. q. 8 hours p.r.n. nausea, vomiting.    3.  Roxicodone 5 mg p.o. q. 6 hours p.r.n. moderate  to severe pain.    4.  Oxycodone 10 mg p.o. q. 12 hours.    5.  Aspirin 325 mg p.o. daily.    6.  Ditropan.    7.  Ascorbic acid.    8.  Namenda 28 mg p.o. daily.    9.  Omeprazole 20 mg p.o. daily.    10.  Digoxin 125 mg p.o. daily.      PHYSICAL EXAMINATION:    VITAL SIGNS:  Temperature 97.3, heart rate 123, respirations 40 and blood   pressure 137/114.  Patient is saturating at 97% on 4 L of oxygen.    GENERAL:  This is an elderly and frail white female who is currently nearly   obtunded and unresponsive.    HEENT:  Normocephalic, atraumatic.  Dry mucous membranes.    NECK:  Supple, no JVD, no supraclavicular adenopathy, NG tube in the right   naris, draining dark green bilious fluid.    CARDIOVASCULAR:  Positive S1, S2, tachycardia, distant heart sounds.  No   murmurs appreciated.    PULMONARY:  Very diminished at the bases, no wheezes, rubs, or rhonchi heard.      ABDOMEN:  Distended, but now improved secondary to NG tube placement,   hypoactive bowel sounds, if any.    EXTREMITIES:  Cool, but perfused.  Distal pulses are intact.  Capillary refill   less than 2 seconds.    NEUROLOGIC:  A and O x0, unable to perform full neurologic exam, but had been   moving all 4 extremities spontaneously.      LABORATORY DATA:  WBC 13.5, hemoglobin 11.4, hematocrit 34.9 and platelets   441.  Sodium 137, potassium 3.9, chloride 103, CO2 is 23, glucose 133, BUN 38,   creatinine 0.99, GFR is 53, lactic acid 2.8.  UA is negative for nitrites,   but positive for large leukocyte esterase.      IMAGING:  CT of the abdomen and pelvis:  Acute to subacute fractures of the   left superior and inferior pubic rami with left pelvic wall hematoma   compressing the left side of the urinary bladder.  Diffuse dilatation of the   colon with air fluid levels with a relatively less distended sigmoid colon   could be related to chronic ileus, distal colonic obstruction or partial   obstruction on the sigmoid colon cannot be excluded, distended  abdomen would   decompress pylorus and duodenum concerning for gastric outlet obstruction,   thickening of the distal esophageal wall concerning for esophagitis, please   see full report for details.      ASSESSMENT AND PLAN:  This is an 88-year-old female who was brought in from a   skilled nursing facility for altered mentation.  Upon assessment in the ER,   she has evidence of sepsis with altered mentation, leukocytosis, lactic   acidosis, tachycardia and tachypnea.    1.  Altered mentation:  Etiology is likely secondary to sepsis from urinary   tract infection.  However, there may be also an element of gastrointestinal   involvement.  She may also have had reoccurrence of bowel obstruction.  The   patient's family members have arrived to bedside and they have requested no   additional treatment.  They would like to transition their mother to comfort   care status.  Given the patient's significant medical comorbidities, poor   overall health, and advanced age, I do believe that this is an informed and   appropriate decision.  The patient will be admitted to the oncology floor on   comfort care.  We will consider hospice consultation in the morning to   transition the patient to hospice on inpatient status.  Family members have   requested  services and this has been ordered.  Patient's symptoms for   pain, nausea and anxiety with be managed with IV medications.       ____________________________________     MD QUYEN CARRANZA / DUDLEY    DD:  05/14/2017 23:00:06  DT:  05/14/2017 23:27:34    D#:  [id number]  Job#:  [id number]

## 2017-05-15 NOTE — DISCHARGE PLANNING
Referral: SNF    Intervention: MIGUEL A met with pt and Isabel, pt's daughter at bedside.  Isabel requested pt returns to [place] with [facility], referrals sent.  Per [facility], [place] is willing to accept pt on hospice.    Plan: SNF with Hospice.

## 2017-05-15 NOTE — PROGRESS NOTES
Ortho:    I am aware of patient's readmission for altered mental status. CT pelvis demonstrates pubic rami insufficiency fractures remain unchanged and stable. My recommendations are the same as last admission: WBAT BLE with walker; PT; f/u with me at [facility] vs. PCP in 1 month for pelvis; f/u with PCP for evaluation of osteoporosis.

## 2017-05-15 NOTE — CARE PLAN
Problem: Safety  Goal: Will remain free from falls  Outcome: PROGRESSING AS EXPECTED  Fall precautions in place: treaded slipper socks on, mobility signs posted, hourly rounding, bed in lowest position, belongings and call light are within reach, bed alarm on, family at bedside    Problem: Skin Integrity  Goal: Risk for impaired skin integrity will decrease  Outcome: PROGRESSING AS EXPECTED  Pt comfort care, Q 2 turns, huntley to be placed, pillows used for comfort and repositioning

## 2017-05-15 NOTE — PROGRESS NOTES
"Arlen Singh Anthony assessed after assuming care. no signs of acute distress and appears to be in stable condition at the moment. Last documented VS: /101 mmHg  Pulse 110  Temp(Src) 36.4 °C (97.5 °F)  Resp 24  Ht 1.575 m (5' 2\")  Wt 71.215 kg (157 lb)  BMI 28.71 kg/m2  SpO2 94%        Mobility: Patient on comfort care    Fall precautions in place. Hourly rounding in place and explained to Arlen Singh. Educated Arlen Singh on call light use as well as phone instructions to call RN or CNA directly. Possessions within patient's reach, fall precautions in place. Pressure ulcer prevention techniques in use, pressure points assessed and pressure relieved from vulnerable areas. Pillows uses copiously for support and positioning where applicable.    All prudent patient safety measures and applicable nursing interventions taken.    Arlen Singh educated on Pain, Position, Potty, Priorities and instructed to notify RN if anything additional can be done to make stay more comfortable including bathing options, linen change, and toiletries.    PMHx on file (click to expand)  Past Medical History   Diagnosis Date   • Stress incontinence 6/19/2009   • Dyslipidemia 6/19/2009   • HTN (hypertension), benign 6/19/2009   • OSTEOPOROSIS 6/19/2009   • Hyperlipidemia    • Atrial fibrillation (CMS-HCC)    • Psychiatric disorder    • Allergy, unspecified not elsewhere classified    • Arthritis    • Unspecified cataract    • CHF (congestive heart failure) (CMS-HCC)    • Chronic airway obstruction, not elsewhere classified    • Depression    • GERD (gastroesophageal reflux disease)    • Kidney disease    • Stroke (CMS-HCC)    • Urinary tract infection, site not specified           Care plan:     Problem: Safety   Goal: Will remain free from falls   Outcome: PROGRESSING as EXPECTED   Arlen Singh educated about fall risk. Will remain free from injury or falls. Appropriate side rails up. Bed in low position, " call light and possions within reach, bed alarm in use. Hourly rounding in place.     Problem: Pain Management   Goal: Pain level will decrease to patient’s comfort goal   Outcome: PROGRESSING as EXPECTED   Following pain management plan, Arlen Singh reports pain on 0-10 scale        *addendum to follow below at end or throughout shift if significant events occurred: if blank n/a    OVERNIGHT SIGNIFICANT EVENTS:  2354 Medicated patient for anxiety

## 2017-05-15 NOTE — DISCHARGE PLANNING
Received choice form from [facility]). Patient needs SNF order. [facility]) notified. Referral sent to [facility].

## 2017-05-15 NOTE — ED NOTES
Chief Complaint   Patient presents with   • ALOC   Pt BIB by [facility], from [facility]. Pt has been eating/drinking less over past 2 days. Pt has become more fatigued, and became obtunded today. . /68. Pt Afib in 150s per EMS report.

## 2017-05-15 NOTE — PROGRESS NOTES
Hospital Medicine Progress Note, Adult, Complex               Author: Sylvain Galvezkeyayunior Date & Time created: 5/15/2017  9:41 AM   CC: AMS    Interval History:  88 year old woman transferred from the NH because of AMS.History is significant for dementia, CVA and recent bowel obstruction. NGT was inserted last night. Low suction was provided. Her abd is softer this am. Zosyn was given as well. Pt was seen by Dr santos because of pubic rami insufficiency fractures . He did not change his recommendations from the last admission Family requested comfort care measures / Hospice care.    Review of Systems:  ROS   Unable to obtain. Pt does not respond to verbal stimuli    Physical Exam:  Physical Exam   Constitutional: Well-developed, well-nourished, (-) diaphoresis, (-) distress  HENT: Normocephalic, atraumatic, (-) tonsillopharyngal congestion, (-) tonsillopharyngeal exudate  Cardiovascular: RRR,  (-) rubs, (-) gallops,  Pulmonary: Equal chest expansion, (+) clear breath sounds  Abdominal: (+) hypoactivebowel sounds, soft, (-) tenderness,  Neurologic: doesn't follow commands. Unresponsive to verbal stimuli  Skin: (-) erythema, (-) warmth, (-) rashes,       Labs:        Invalid input(s): [id number]      Recent Labs      17   SODIUM  137   POTASSIUM  3.9   CHLORIDE  103   CO2  23   BUN  38*   CREATININE  0.99   CALCIUM  9.3     Recent Labs      17   ALTSGPT  94*   ASTSGOT  93*   ALKPHOSPHAT  170*   TBILIRUBIN  1.4   GLUCOSE  133*     Recent Labs      17   RBC  4.04*   HEMOGLOBIN  11.4*   HEMATOCRIT  34.9*   PLATELETCT  441     Recent Labs      17   WBC  13.5*   NEUTSPOLYS  70.80   LYMPHOCYTES  22.30   MONOCYTES  5.60   EOSINOPHILS  0.10   BASOPHILS  0.40   ASTSGOT  93*   ALTSGPT  94*   ALKPHOSPHAT  170*   TBILIRUBIN  1.4           Hemodynamics:  Temp (24hrs), Av.4 °C (97.6 °F), Min:36.3 °C (97.3 °F), Max:36.6 °C (97.9 °F)  Temperature: 36.6 °C (97.9 °F)  Pulse   Av.3  Min: 80  Max: 154Heart Rate (Monitored): (!) 114  Blood Pressure : 123/76 mmHg, NIBP: 102/75 mmHg     Respiratory:    Respiration: 16, Pulse Oximetry: 98 %  Fluids:  No intake or output data in the 24 hours ending 05/15/17 0941  Weight: 71.215 kg (157 lb)  GI/Nutrition:  Orders Placed This Encounter   Procedures   • Diet Order     Standing Status: Standing      Number of Occurrences: 1      Standing Expiration Date:      Order Specific Question:  Diet:     Answer:  Regular [1]     Medical Decision Making, by Problem:  Active Hospital Problems    Diagnosis   • Sepsis (CMS-HCC) [A41.9]   • Lactic acidosis [E87.2]   • UTI (urinary tract infection) [N39.0]   • Altered mental state [R41.82]  DC all meds that are not being given for comfort  Hospice care consult  DC blood tests.  Comfort cares only  DC NGT  Insert huntley cath       Core Measures  Comfort cares only  DNR

## 2017-05-15 NOTE — ED NOTES
Floor called and notified of transport.  Arlen Cruz transported to [facility] via gurney with transport. All personal belongings in possession.  NAD noted.

## 2017-05-16 VITALS
SYSTOLIC BLOOD PRESSURE: 96 MMHG | RESPIRATION RATE: 20 BRPM | WEIGHT: 157 LBS | DIASTOLIC BLOOD PRESSURE: 69 MMHG | HEIGHT: 62 IN | OXYGEN SATURATION: 85 % | BODY MASS INDEX: 28.89 KG/M2 | HEART RATE: 144 BPM | TEMPERATURE: 97.4 F

## 2017-05-16 LAB
BACTERIA UR CULT: ABNORMAL
BACTERIA UR CULT: ABNORMAL
SIGNIFICANT IND 70042: ABNORMAL
SITE SITE: ABNORMAL
SOURCE SOURCE: ABNORMAL

## 2017-05-16 PROCEDURE — 700111 HCHG RX REV CODE 636 W/ 250 OVERRIDE (IP): Performed by: HOSPITALIST

## 2017-05-16 PROCEDURE — 99239 HOSP IP/OBS DSCHRG MGMT >30: CPT | Performed by: INTERNAL MEDICINE

## 2017-05-16 RX ORDER — OXYCODONE HCL 20 MG/ML
10 CONCENTRATE, ORAL ORAL
Qty: 30 ML | Refills: 0
Start: 2017-05-16

## 2017-05-16 RX ORDER — ONDANSETRON 8 MG/1
8 TABLET, ORALLY DISINTEGRATING ORAL EVERY 8 HOURS PRN
Qty: 10 TAB | Refills: 0
Start: 2017-05-16

## 2017-05-16 RX ORDER — LORAZEPAM 1 MG/1
1 TABLET ORAL EVERY 4 HOURS PRN
Qty: 30 TAB | Refills: 0
Start: 2017-05-16

## 2017-05-16 RX ORDER — POLYVINYL ALCOHOL 14 MG/ML
2 SOLUTION/ DROPS OPHTHALMIC EVERY 6 HOURS PRN
Qty: 1 BOTTLE | Refills: 3
Start: 2017-05-16

## 2017-05-16 RX ORDER — POLYVINYL ALCOHOL 14 MG/ML
2 SOLUTION/ DROPS OPHTHALMIC EVERY 6 HOURS PRN
Status: DISCONTINUED | OUTPATIENT
Start: 2017-05-16 | End: 2017-05-16 | Stop reason: HOSPADM

## 2017-05-16 RX ORDER — ONDANSETRON 4 MG/1
4 TABLET, ORALLY DISINTEGRATING ORAL EVERY 8 HOURS PRN
Status: DISCONTINUED | OUTPATIENT
Start: 2017-05-16 | End: 2017-05-16 | Stop reason: HOSPADM

## 2017-05-16 RX ORDER — ONDANSETRON 2 MG/ML
4 INJECTION INTRAMUSCULAR; INTRAVENOUS EVERY 8 HOURS PRN
Status: DISCONTINUED | OUTPATIENT
Start: 2017-05-16 | End: 2017-05-16 | Stop reason: HOSPADM

## 2017-05-16 RX ORDER — ONDANSETRON 2 MG/ML
8 INJECTION INTRAMUSCULAR; INTRAVENOUS EVERY 8 HOURS PRN
Status: DISCONTINUED | OUTPATIENT
Start: 2017-05-16 | End: 2017-05-16 | Stop reason: HOSPADM

## 2017-05-16 RX ORDER — ONDANSETRON 4 MG/1
8 TABLET, ORALLY DISINTEGRATING ORAL EVERY 8 HOURS PRN
Status: DISCONTINUED | OUTPATIENT
Start: 2017-05-16 | End: 2017-05-16 | Stop reason: HOSPADM

## 2017-05-16 RX ORDER — OXYCODONE HCL 20 MG/ML
10 CONCENTRATE, ORAL ORAL
Status: DISCONTINUED | OUTPATIENT
Start: 2017-05-16 | End: 2017-05-16 | Stop reason: HOSPADM

## 2017-05-16 RX ORDER — SCOLOPAMINE TRANSDERMAL SYSTEM 1 MG/1
1 PATCH, EXTENDED RELEASE TRANSDERMAL
Qty: 4 PATCH | Refills: 3
Start: 2017-05-16

## 2017-05-16 RX ORDER — ATROPINE SULFATE 10 MG/ML
2-4 SOLUTION/ DROPS OPHTHALMIC
Status: DISCONTINUED | OUTPATIENT
Start: 2017-05-16 | End: 2017-05-16 | Stop reason: HOSPADM

## 2017-05-16 RX ORDER — LORAZEPAM 1 MG/1
1 TABLET ORAL EVERY 4 HOURS PRN
Status: DISCONTINUED | OUTPATIENT
Start: 2017-05-16 | End: 2017-05-16 | Stop reason: HOSPADM

## 2017-05-16 RX ADMIN — LORAZEPAM 4 MG: 2 INJECTION INTRAMUSCULAR at 05:02

## 2017-05-16 RX ADMIN — HYDROMORPHONE HYDROCHLORIDE 1 MG: 1 INJECTION, SOLUTION INTRAMUSCULAR; INTRAVENOUS; SUBCUTANEOUS at 04:53

## 2017-05-16 ASSESSMENT — LIFESTYLE VARIABLES: EVER_SMOKED: UNABLE TO EVALUATE AT THIS TIME - NEEDS ASSESSMENT PRIOR TO DISCHARGE

## 2017-05-16 NOTE — DISCHARGE PLANNING
Referral: SNF    Intervention: Per Devendra BERMUDEZ transfer to [place] is scheduled for today at 2:00pm.  SW notified GRETA Mohamud and Isabel, pt's dtr via voicemail.    Plan:  SNF

## 2017-05-16 NOTE — DISCHARGE INSTRUCTIONS
Discharge Instructions    Discharged to [facility] by medical transportation with escort. Discharged via ambulance, hospital escort: Yes.  Special equipment needed: Not Applicable    Be sure to schedule a follow-up appointment with your primary care doctor or any specialists as instructed.     Discharge Plan:   Diet Plan: Discussed  Activity Level: Discussed  Confirmed Follow up Appointment: No (Comments)  Confirmed Symptoms Management: Discussed  Medication Reconciliation Updated: Yes  Influenza Vaccine Indication: Patient Refuses    I understand that a diet low in cholesterol, fat, and sodium is recommended for good health. Unless I have been given specific instructions below for another diet, I accept this instruction as my diet prescription.   Other diet: NA    Special Instructions: NA    · Is patient discharged on Warfarin / Coumadin?   No     · Is patient Post Blood Transfusion?  No    Depression / Suicide Risk    As you are discharged from this [facility] facility, it is important to learn how to keep safe from harming yourself.    Recognize the warning signs:  · Abrupt changes in personality, positive or negative- including increase in energy   · Giving away possessions  · Change in eating patterns- significant weight changes-  positive or negative  · Change in sleeping patterns- unable to sleep or sleeping all the time   · Unwillingness or inability to communicate  · Depression  · Unusual sadness, discouragement and loneliness  · Talk of wanting to die  · Neglect of personal appearance   · Rebelliousness- reckless behavior  · Withdrawal from people/activities they love  · Confusion- inability to concentrate     If you or a loved one observes any of these behaviors or has concerns about self-harm, here's what you can do:  · Talk about it- your feelings and reasons for harming yourself  · Remove any means that you might use to hurt yourself (examples: pills, rope, extension cords,  firearm)  · Get professional help from the community (Mental Health, Substance Abuse, psychological counseling)  · Do not be alone:Call your Safe Contact- someone whom you trust who will be there for you.  · Call your local CRISIS HOTLINE 439-1011 or 571-258-5063  · Call your local Children's Mobile Crisis Response Team [place] (461) 382-8846 or www.eMoov  · Call the toll free National Suicide Prevention Hotlines   · National Suicide Prevention Lifeline 470-781-BCTG (9589)  · National Hope Line Network 800-SUICIDE (604-1910)

## 2017-05-16 NOTE — CONSULTS
DATE OF SERVICE:  05/07/2017    ORTHOPEDIC CONSULTATION    CHIEF COMPLAINT:  Left groin pain.    HISTORY OF PRESENT ILLNESS:  The patient is a pleasant 88-year-old female with   multiple medical comorbidities who was admitted to [facility] status   post ground level fall.  She is very poor historian due to dementia; however,   she was found to have a left superior and inferior pubic rami fracture.  I was   consulted as the orthopedic surgeon on call.  Upon seeing the patient, she is   a very poor historian.  She is able to estimate her pain approximately 8/10   in severity, aching pain in her groin, radiating down her leg.  She denies   pain elsewhere in her extremities.  She denies any numbness in the left lower   extremity or pain seems to be somewhat amenable to bed rest, ice, and pain   medication.    PAST MEDICAL HISTORY:  Incontinence, dyslipidemia, hypertension, osteoporosis,   hyperlipidemia, atrial fibrillation, congestive heart failure, COPD, GERD,   depression, kidney stones, history of stroke and urinary tract infection.    PAST SURGICAL HISTORY:  Cholecystectomy, abdominal hysterectomy.    ALLERGIES:  HYDROCHLOROTHIAZIDE, AVELOX, CODEINE.    MEDICATIONS:  Polypharmacy, please see chart.    FAMILY HISTORY:  Negative for bleeding disorders or anesthetic reactions.    SOCIAL HISTORY:  She lives locally.  She denies tobacco, alcohol or drug use.    REVIEW OF SYSTEMS:  Thorough review of systems was attempted and is negative   except for past medical history and history of present illness.    PHYSICAL EXAMINATION:  GENERAL:  She is a very frail elderly female.  HEENT:  Normocephalic, atraumatic.  NEUROLOGIC:  Cranial nerves II-XII are grossly intact.  CARDIOVASCULAR:  2+ distal pulses.  EXTREMITIES:  No cyanosis, clubbing, or edema.  PULMONARY:  Breathing comfortably on room air without labor.  ABDOMEN:  Soft and unremarkable.  SKIN:  No rashes, jaundice, or cyanosis.  SPINE:  Clinically midline and  nontender.  MUSCULOSKELETAL:  Bilateral upper extremities, bilateral lower extremities, no   tenderness to palpation, pain with range of motion.  She has no pain with log   roll.  She does have mild tenderness to palpation about her pelvis.  PELVIS:  Stable in AP and lateral compression, mild left superior pubic ramus   tenderness to palpation.  NEUROLOGIC:  Left lower extremity sensation intact to light touch, L4, L5, S1   dermatomes.    IMAGING:  Multiple radiographic views of the pelvis as well as MRI of the   pelvis demonstrate nondisplaced left superior and inferior pubic rami   fractures nondisplaced, zone 1 sacral alar fracture nondisplaced.  No widening   of the SI joints, no evidence of pelvic instability.    ASSESSMENT:  An 88-year-old female with left-sided pelvic ring insufficiency   fractures.    PLAN:  I recommendation admission to hospitalist for management of her medical   comorbidities and DVT prophylaxis.  I recommend weightbearing as tolerated   with physical therapy and walker.  I recommend follow up with me in the [facility] in approximately 1 month versus primary care physician.  She   should also be worked up for osteoporosis by her primary care physician and   go through a fall prevention protocol.       ____________________________________     MD DOUG Johnson / DUDLEY    DD:  05/15/2017 13:09:35  DT:  05/15/2017 18:18:25    D#:  [id number]  Job#:  [id number]

## 2017-05-16 NOTE — PROGRESS NOTES
"Arlen Singh Anthony assessed after assuming care. no signs of acute distress and appears to be in stable condition. Last documented VS: /76 mmHg  Pulse 80  Temp(Src) 36.6 °C (97.9 °F)  Resp 16  Ht 1.575 m (5' 2\")  Wt 71.215 kg (157 lb)  BMI 28.71 kg/m2  SpO2 90%        Mobility: Patient in bed on comfort care    Fall precautions in place. Hourly rounding in place and explained to Arlen Singh. Educated Arlen Singh on call light use as well as phone instructions to call RN or CNA directly. Possessions within patient's reach, fall precautions in place. Pressure ulcer prevention techniques in use, pressure points assessed and pressure relieved from vulnerable areas. Pillows uses copiously for support and positioning where applicable.    All prudent patient safety measures and applicable nursing interventions taken.    Arlen Singh educated on Pain, Position, Potty, Priorities and instructed to notify RN if anything additional can be done to make stay more comfortable including bathing options, linen change, and toiletries.    PMHx on file (click to expand)  Past Medical History   Diagnosis Date   • Stress incontinence 6/19/2009   • Dyslipidemia 6/19/2009   • HTN (hypertension), benign 6/19/2009   • OSTEOPOROSIS 6/19/2009   • Hyperlipidemia    • Atrial fibrillation (CMS-HCC)    • Psychiatric disorder    • Allergy, unspecified not elsewhere classified    • Arthritis    • Unspecified cataract    • CHF (congestive heart failure) (CMS-HCC)    • Chronic airway obstruction, not elsewhere classified    • Depression    • GERD (gastroesophageal reflux disease)    • Kidney disease    • Stroke (CMS-HCC)    • Urinary tract infection, site not specified           Care plan:     Problem: Safety   Goal: Will remain free from falls   Outcome: PROGRESSING as EXPECTED   Arlen Singh educated about fall risk. Will remain free from injury or falls. Appropriate side rails up. Bed in low position, call " light and possions within reach, bed alarm in use. Hourly rounding in place.     Problem: Pain Management   Goal: Pain level will decrease to patient’s comfort goal   Outcome: PROGRESSING as EXPECTED   Following pain management plan, Arlen Singh reports pain on 0-10 scale        *addendum to follow below at end or throughout shift if significant events occurred: if blank n/a    OVERNIGHT SIGNIFICANT EVENTS:  1845 Family present   0500 patient restless after incontinence. Patient appears in pain during perineal care, medicated appropriately.

## 2017-05-16 NOTE — DISCHARGE PLANNING
Referral: SNF    Intervention: Per MD, pt is medically clear today.  RN indicates pt is appropriate for ambulance transportation.  [facility] and Transportation Form faxed to [facility].      Plan: SNF with [facility], pending transfer arrangements.

## 2017-05-16 NOTE — DISCHARGE PLANNING
Received PCS form from [facility]). Arranged patients transport to [place] at 1400 via [facility]. Jaiden([place]), Radha(MIGUEL A) and [facility] notified of transport time via phone.

## 2017-05-16 NOTE — DISCHARGE SUMMARY
C O N F I D E N T I A L I N F O R M A T I O N   -------------------------------------------------------------------------------------------------------------------   DISCHARGE SUMMARY    Patient ID:  Arlen Cruz  [id number]  88 y.o.female  3/9/1929    Admit date: 5/14/2017    Discharge date and time: 05/16/2017    Admitting Physician: Verna Bales M.D.    Discharge Physician: Richar Marie    CODE STATUS: Comfort measures only    Admission Diagnoses:   UTI (urinary tract infection)  Altered mental state  Lactic acidosis  Sepsis (CMS-[facility])    Discharge Diagnoses:   1) End of life care, POA  2) Comfort measures only status, POA  3) Acute Encephalopathy, POA 2/2 acute CVA, Infectious (UTI) and Hospital / SNF induced delirium in the setting of chronic dementia  4) Severe Sepsis, POA SIRS noted. Source UTI. Severe given LA > 2 and encephalopathy 2/2 sepsis.   5) UTI, POA  6) Colonic ileus, POA  7) Esophagitis, POA  8) Acute CVA, POA with hemorrhagic transformation, POA  9) Azotemia, POA  10) Transaminitis, POA  11) Lactic acidosis, POA  12) Chronic anemia, POA  13) Advanced Dementia  14) Pelvic fractures, POA  15) Debility  16) Sick Sinus syndrome  17) Persistent Atrial fibrillation  18) GERD  19) Hx HTN  20) Hx CKD  21) Hx Depression  22) Hx HLD    Admission Condition: poor    Discharged Condition: poor    Indication for Admission: End of life care / Comfort measures    HPI: As noted by Dr Bales on 05/14/2017    Hospital Course: This is a 88 years old female with advanced dementia who was recently discharged to SNF from the hospital after sustaining pelvic fractures and was treated for a UTI. She presented to the hospital with acute encephalopathy. Findings revealed severe sepsis 2/2 UTI, acute encephalopathy and acute CVA on presentation with concern for gastric outlet obstruction and colonic ileus on imaging. Admitting hospitalist discussed the findings with family. Per family request patient was admitted  to the hospital for end of life care with comfort measures only status. Patient remain in the hospital on comfort measures only status. Her family has requested discharge back to [facility] with [facility]. She has been accepted by both. I have discussed the case with social workers and the arrangements are in place for patient to transitioned to [facility] with hospice care. Patient has been transitioned to oral medications. Hospice team to assume care at SNF and continue to manage for end of life care.     Consults: none    Significant Studies:   CT-ABDOMEN-PELVIS WITH   Final Result         1. Acute to subacute fractures of the left superior and inferior pubic rami with left pelvic sidewall hematoma compressing the left side of the urinary bladder.      2. Diffuse dilatation of the colon with air-fluid levels with relatively less distended sigmoid colon. The finding could relate to colonic ileus. Distal colonic obstruction or partial obstruction around the sigmoid colon area cannot be entirely excluded.      3. Distended stomach with decompressed pylorus and duodenum, concerning for gastric outlet obstruction. Potentially, the very dilated hepatic flexure of the colon could compress the distal stomach and cause the obstruction.      4. Thickening of the distal esophageal wall, concerning for esophagitis.      5. Status post cholecystectomy. Intra and extrahepatic biliary ductal dilatation is likely related to postcholecystectomy status      6. Small bilateral pleural effusions with associated bibasilar opacities, atelectasis versus consolidation.      7. Mild dilatation of the ascending aorta, measuring up to 4.0 cm.      CT-HEAD W/O   Final Result         1. Ill-defined low-attenuation area in the left occipital lobe, in keeping with acute infarct.   2. Tiny foci of high attenuation in the middle could relate to thrombosed vessels versus small hemorrhagic conversion.      MRI would be helpful  for further evaluation.      CRITICAL RESULT READ BACK: Preliminary findings discussed with and critical read back performed by Dr. DANK MOTLEY in the Emergency Department via telephone on 5/14/2017 8:14 PM      DX-CHEST-PORTABLE (1 VIEW)   Final Result      Bibasilar opacities may represent hypoinflation atelectasis.      Right upper lobe focal opacity is not significantly changed. This could represent scarring.      Atherosclerotic plaque.      Dilated loops of bowel in the visualized upper abdomen. Findings may be related to ileus or bowel obstruction. A CT of the abdomen and pelvis is currently pending.          Procedures Performed While Hospitalized: None    Operations During Hospitalization: None    Disposition: SNF with hospice care    Medications at discharge:   Arlen Cruz   Home Medication Instructions VERITO:[id number]    Printed on:05/16/17 0709   Medication Information                      artificial tears 1.4 % Solution  Place 2 Drops in both eyes every 6 hours as needed (Dry eyes   ).             lidocaine viscous 2% (XYLOCAINE) 2 % Solution  Take 5 mL by mouth every four hours as needed for Throat/Mouth Pain (Comfort).             lorazepam (ATIVAN) 1 MG Tab  Place 1 Tab under tongue every four hours as needed (Anxiety / restlessness).             ondansetron (ZOFRAN ODT) 8 MG TABLET DISPERSIBLE  Take 1 Tab by mouth every 8 hours as needed for Nausea/Vomiting.             oxycodone 20 MG/ML Conc  Take 0.5 mL by mouth every 30 minutes as needed (pain).             scopolamine (TRANSDERM-SCOP) 1.5 MG/3DAYS PATCH 72 HR  Apply 1 Patch to skin as directed every 72 hours.                 Opioid prescription history checked: N/A. Discharge hospice.     Time spend preparing discharge: 35 minutes. This included face to face with the patient, medication reconciliation, care co ordination with RN involved in patient care and discussion and co ordination with case management.     Signed:  Richar  Frank  5/16/2017  8:52 AM

## 2019-09-04 NOTE — PROGRESS NOTES
Assumed pt care at 1900. Received bedside report from RN. PM assessment completed. AAOx1. Pt denies SOB; c/o pain of 0 on 0 to 10 pain scale (Will administer medication PRN - see MAR). Provided pt with RN and CNA extension numbers on white board and encouraged pt to call when needed. Discussed plan of care for the night, pt verbalizes understanding. VSS. Denies any additional needs at this time. Call light, belongings, and phone within reach. Hourly rounding in effect.     Home

## 2021-01-13 DIAGNOSIS — Z23 NEED FOR VACCINATION: ICD-10-CM

## 2021-09-26 NOTE — DISCHARGE PLANNING
Attempted to complete screening but pt was disoriented. Per pt chart pt's Orientation is: Disoriented to Event, Disoriented to Place, Disoriented to Time.    
Attempted to revisit pt and she was sleeping. Her chart continues to reflect pt is Disoriented to Event, Disoriented to Place, Disoriented to Time. Communicated to SW.     
Medical Social Work    MIGUEL A was advised by assigned RN on 3/23/17 in the late afternoon that pt is medically ready for d/c. MIGUEL A informed pt is confused and pt's daughter, Isabel Hyatt, would need to be contacted for any consent. MIGUEL A called pt's daughter, Isabel, and informed her pt has been accepted to [facility] and [facility], to which Isabel stated she would like her mother to go to [facility]. MIGUEL A informed Isabel that pt would be transported to [facility] the following day (3/24/17) and MIGUEL A would call her with a time once one was determined. Isabel verbally indicated she is agreeable to pt's transfer. Isabel denied MIGUEL A's offer to provide [facility]'s address and phone number as Isabel stated she already has this information.    MIGUEL A completed transport form and faxed it to [facility] for processing.    Plan: MIGUEL A will notify Dr. Rodney of the above during IDT. MIGUEL A will place transfer packet on pt's chart and notify assigned RN of confirmed time for pt's transfer. Pt will transfer to [facility] today, MIGUEL A will await confirmation on time from [facility].   
Medical Social Work    SW attempted to meet with pt to complete assessment, but pt was sleeping on two separate attempts.    Plan: SW will attempt to meet with pt and complete assessment.   
Medical Social Work    SW completed a PASRR for pt ([id number]).  
Medical Social Work    SW notified pt's daughter, Isabel, that transportation has been arranged and is scheduled to take pt to [facility] at noon today.    Plan: SW will place transfer packet on pt's chart and notify assigned RN. Pt will transfer to [facility] at noon on today's date.   
[place] [facility] is currently with a family, left message for them to call regarding status of referral.  
Received call from Crystal at [facility], referral has been declined.  
Received choice form from HEATHER Fenton at 1200. Referral sent to [facility] at 1209.  
Received notification via fax from [facility], SNF referral has been accepted.  
Received notification via voicemail from Isabel Brooks at [facility], referral has been accepted.  
Received transportation request for patient to be transported to [facility]. [facility] will be  patient at noon 1200. Notified floor team via voicemail.   
Received voicemail message form Claire at [facility], referral has been declined.  
SNF Referrals sent to [facility], [facility], [facility], [facility], [facility] per choice form..  
Transitional Care Navigator:  [facility] referral sent due to decline by [facility] and [facility].  SW aware.  
Unable to complete screening due to patient being disoriented. Communicated with SW and it was agreed no additional  revisits necessary to attempt screening due to pt's disorientation.     
Unable to complete screening on 3/17 due to patient being disoriented.   
99

## 2022-06-08 NOTE — CARE PLAN
Problem: Safety  Goal: Will remain free from falls  Outcome: PROGRESSING AS EXPECTED  Safety measures in place             Pt cancled appt with CAB at [facility] location and has canceled with educator.  Sending warning letter to pt with [address] certified mail sending copy with attached receipt to scan

## 2022-11-08 NOTE — ED NOTES
[facility] pt [facility]  Allergies reviewed  Pt finished Bactrim on 05/11/17   left upper arm

## 2025-03-06 NOTE — PROGRESS NOTES
RR called due to pt having a Mews of 4 and uncontrolled afib on vitals monitor. Pt arrived to the unit with RR of 32, febrile and with a pulse that jumped from the 40's to the 140's and continued to fluctuate. RR team ordered a lactic acid which came back as 1.6. I spoke with hospitalist Halina Jeter NP who ordered NS at 100 and ABGs. Acetaminophen was given for pt elevated temp and pt has remained on vitals machine to monitor pulse and O2 sats.   No